# Patient Record
Sex: FEMALE | Race: WHITE | NOT HISPANIC OR LATINO | Employment: OTHER | URBAN - METROPOLITAN AREA
[De-identification: names, ages, dates, MRNs, and addresses within clinical notes are randomized per-mention and may not be internally consistent; named-entity substitution may affect disease eponyms.]

---

## 2017-03-13 DIAGNOSIS — J43.8 OTHER EMPHYSEMA (HCC): ICD-10-CM

## 2017-03-16 DIAGNOSIS — R06.02 SOB (SHORTNESS OF BREATH): ICD-10-CM

## 2017-03-16 RX ORDER — ALBUTEROL SULFATE 2.5 MG/3ML
2.5 SOLUTION RESPIRATORY (INHALATION) EVERY 4 HOURS PRN
Qty: 75 ML | Refills: 3 | Status: SHIPPED | OUTPATIENT
Start: 2017-03-16 | End: 2017-08-10

## 2017-06-05 RX ORDER — IRBESARTAN AND HYDROCHLOROTHIAZIDE 150; 12.5 MG/1; MG/1
TABLET, FILM COATED ORAL
Qty: 100 TAB | Refills: 1 | Status: ON HOLD | OUTPATIENT
Start: 2017-06-05 | End: 2017-08-12

## 2017-06-05 RX ORDER — CLOPIDOGREL BISULFATE 75 MG/1
TABLET ORAL
Qty: 100 TAB | Refills: 1 | Status: SHIPPED | OUTPATIENT
Start: 2017-06-05 | End: 2018-03-11 | Stop reason: SDUPTHER

## 2017-06-05 NOTE — TELEPHONE ENCOUNTER
Was the patient seen in the last year in this department? Yes     Does patient have an active prescription for medications requested? Yes     Received Request Via: Pharmacy     Last Visit: 10/17/16  Last Labs: 8/27/16

## 2017-06-19 RX ORDER — ATORVASTATIN CALCIUM 40 MG/1
TABLET, FILM COATED ORAL
Qty: 100 TAB | Refills: 1 | Status: SHIPPED | OUTPATIENT
Start: 2017-06-19 | End: 2017-08-10

## 2017-08-10 ENCOUNTER — APPOINTMENT (OUTPATIENT)
Dept: RADIOLOGY | Facility: MEDICAL CENTER | Age: 67
DRG: 690 | End: 2017-08-10
Attending: STUDENT IN AN ORGANIZED HEALTH CARE EDUCATION/TRAINING PROGRAM
Payer: MEDICARE

## 2017-08-10 ENCOUNTER — APPOINTMENT (OUTPATIENT)
Dept: RADIOLOGY | Facility: MEDICAL CENTER | Age: 67
DRG: 690 | End: 2017-08-10
Attending: EMERGENCY MEDICINE
Payer: MEDICARE

## 2017-08-10 ENCOUNTER — HOSPITAL ENCOUNTER (INPATIENT)
Facility: MEDICAL CENTER | Age: 67
LOS: 2 days | DRG: 690 | End: 2017-08-12
Attending: EMERGENCY MEDICINE | Admitting: INTERNAL MEDICINE
Payer: MEDICARE

## 2017-08-10 ENCOUNTER — RESOLUTE PROFESSIONAL BILLING HOSPITAL PROF FEE (OUTPATIENT)
Dept: HOSPITALIST | Facility: MEDICAL CENTER | Age: 67
End: 2017-08-10
Payer: MEDICARE

## 2017-08-10 DIAGNOSIS — N39.0 ACUTE UTI: ICD-10-CM

## 2017-08-10 DIAGNOSIS — N83.8 OVARIAN MASS, LEFT: ICD-10-CM

## 2017-08-10 DIAGNOSIS — A41.9 SEPSIS, DUE TO UNSPECIFIED ORGANISM: ICD-10-CM

## 2017-08-10 DIAGNOSIS — R50.9 FEVER, UNSPECIFIED FEVER CAUSE: ICD-10-CM

## 2017-08-10 PROBLEM — N94.89 ADNEXAL MASS: Status: ACTIVE | Noted: 2017-08-10

## 2017-08-10 PROBLEM — N12 PYELONEPHRITIS: Status: ACTIVE | Noted: 2017-08-10

## 2017-08-10 LAB
ALBUMIN SERPL BCP-MCNC: 3.8 G/DL (ref 3.2–4.9)
ALBUMIN/GLOB SERPL: 1.2 G/DL
ALP SERPL-CCNC: 100 U/L (ref 30–99)
ALT SERPL-CCNC: 56 U/L (ref 2–50)
ANION GAP SERPL CALC-SCNC: 9 MMOL/L (ref 0–11.9)
APPEARANCE UR: ABNORMAL
APTT PPP: 32.5 SEC (ref 24.7–36)
AST SERPL-CCNC: 45 U/L (ref 12–45)
BACTERIA #/AREA URNS HPF: ABNORMAL /HPF
BASOPHILS # BLD AUTO: 0.5 % (ref 0–1.8)
BASOPHILS # BLD: 0.05 K/UL (ref 0–0.12)
BILIRUB SERPL-MCNC: 0.8 MG/DL (ref 0.1–1.5)
BILIRUB UR QL STRIP.AUTO: NEGATIVE
BUN SERPL-MCNC: 16 MG/DL (ref 8–22)
CALCIUM SERPL-MCNC: 9.4 MG/DL (ref 8.5–10.5)
CHLORIDE SERPL-SCNC: 101 MMOL/L (ref 96–112)
CO2 SERPL-SCNC: 21 MMOL/L (ref 20–33)
COLOR UR: YELLOW
CREAT SERPL-MCNC: 1.58 MG/DL (ref 0.5–1.4)
EKG IMPRESSION: NORMAL
EOSINOPHIL # BLD AUTO: 0 K/UL (ref 0–0.51)
EOSINOPHIL NFR BLD: 0 % (ref 0–6.9)
ERYTHROCYTE [DISTWIDTH] IN BLOOD BY AUTOMATED COUNT: 45.1 FL (ref 35.9–50)
GFR SERPL CREATININE-BSD FRML MDRD: 32 ML/MIN/1.73 M 2
GLOBULIN SER CALC-MCNC: 3.3 G/DL (ref 1.9–3.5)
GLUCOSE SERPL-MCNC: 118 MG/DL (ref 65–99)
GLUCOSE UR STRIP.AUTO-MCNC: NEGATIVE MG/DL
HCT VFR BLD AUTO: 43.8 % (ref 37–47)
HGB BLD-MCNC: 14.7 G/DL (ref 12–16)
IMM GRANULOCYTES # BLD AUTO: 0.1 K/UL (ref 0–0.11)
IMM GRANULOCYTES NFR BLD AUTO: 1 % (ref 0–0.9)
INR PPP: 1.04 (ref 0.87–1.13)
KETONES UR STRIP.AUTO-MCNC: NEGATIVE MG/DL
LACTATE BLD-SCNC: 1.1 MMOL/L (ref 0.5–2)
LACTATE BLD-SCNC: 1.3 MMOL/L (ref 0.5–2)
LEUKOCYTE ESTERASE UR QL STRIP.AUTO: ABNORMAL
LYMPHOCYTES # BLD AUTO: 1.03 K/UL (ref 1–4.8)
LYMPHOCYTES NFR BLD: 9.9 % (ref 22–41)
MCH RBC QN AUTO: 31.5 PG (ref 27–33)
MCHC RBC AUTO-ENTMCNC: 33.6 G/DL (ref 33.6–35)
MCV RBC AUTO: 93.8 FL (ref 81.4–97.8)
MICRO URNS: ABNORMAL
MONOCYTES # BLD AUTO: 1.02 K/UL (ref 0–0.85)
MONOCYTES NFR BLD AUTO: 9.8 % (ref 0–13.4)
NEUTROPHILS # BLD AUTO: 8.2 K/UL (ref 2–7.15)
NEUTROPHILS NFR BLD: 78.8 % (ref 44–72)
NITRITE UR QL STRIP.AUTO: NEGATIVE
NRBC # BLD AUTO: 0 K/UL
NRBC BLD AUTO-RTO: 0 /100 WBC
PH UR STRIP.AUTO: 6 [PH]
PLATELET # BLD AUTO: 165 K/UL (ref 164–446)
PMV BLD AUTO: 9.8 FL (ref 9–12.9)
POTASSIUM SERPL-SCNC: 4.1 MMOL/L (ref 3.6–5.5)
PROT SERPL-MCNC: 7.1 G/DL (ref 6–8.2)
PROT UR QL STRIP: 100 MG/DL
PROTHROMBIN TIME: 13.9 SEC (ref 12–14.6)
RBC # BLD AUTO: 4.67 M/UL (ref 4.2–5.4)
RBC # URNS HPF: ABNORMAL /HPF
RBC UR QL AUTO: ABNORMAL
SODIUM SERPL-SCNC: 131 MMOL/L (ref 135–145)
SP GR UR STRIP.AUTO: 1
WBC # BLD AUTO: 10.4 K/UL (ref 4.8–10.8)
WBC #/AREA URNS HPF: ABNORMAL /HPF

## 2017-08-10 PROCEDURE — 87077 CULTURE AEROBIC IDENTIFY: CPT

## 2017-08-10 PROCEDURE — 36415 COLL VENOUS BLD VENIPUNCTURE: CPT

## 2017-08-10 PROCEDURE — 700111 HCHG RX REV CODE 636 W/ 250 OVERRIDE (IP): Performed by: EMERGENCY MEDICINE

## 2017-08-10 PROCEDURE — 700101 HCHG RX REV CODE 250

## 2017-08-10 PROCEDURE — 71010 DX-CHEST-PORTABLE (1 VIEW): CPT

## 2017-08-10 PROCEDURE — 700105 HCHG RX REV CODE 258: Performed by: EMERGENCY MEDICINE

## 2017-08-10 PROCEDURE — 93005 ELECTROCARDIOGRAM TRACING: CPT | Performed by: EMERGENCY MEDICINE

## 2017-08-10 PROCEDURE — 80053 COMPREHEN METABOLIC PANEL: CPT

## 2017-08-10 PROCEDURE — 85730 THROMBOPLASTIN TIME PARTIAL: CPT

## 2017-08-10 PROCEDURE — 94760 N-INVAS EAR/PLS OXIMETRY 1: CPT

## 2017-08-10 PROCEDURE — 81001 URINALYSIS AUTO W/SCOPE: CPT

## 2017-08-10 PROCEDURE — 99285 EMERGENCY DEPT VISIT HI MDM: CPT

## 2017-08-10 PROCEDURE — 74176 CT ABD & PELVIS W/O CONTRAST: CPT

## 2017-08-10 PROCEDURE — A9270 NON-COVERED ITEM OR SERVICE: HCPCS | Performed by: STUDENT IN AN ORGANIZED HEALTH CARE EDUCATION/TRAINING PROGRAM

## 2017-08-10 PROCEDURE — 700102 HCHG RX REV CODE 250 W/ 637 OVERRIDE(OP): Performed by: STUDENT IN AN ORGANIZED HEALTH CARE EDUCATION/TRAINING PROGRAM

## 2017-08-10 PROCEDURE — 85610 PROTHROMBIN TIME: CPT

## 2017-08-10 PROCEDURE — 700111 HCHG RX REV CODE 636 W/ 250 OVERRIDE (IP): Performed by: STUDENT IN AN ORGANIZED HEALTH CARE EDUCATION/TRAINING PROGRAM

## 2017-08-10 PROCEDURE — 85025 COMPLETE CBC W/AUTO DIFF WBC: CPT

## 2017-08-10 PROCEDURE — 87186 SC STD MICRODIL/AGAR DIL: CPT

## 2017-08-10 PROCEDURE — 700105 HCHG RX REV CODE 258: Performed by: STUDENT IN AN ORGANIZED HEALTH CARE EDUCATION/TRAINING PROGRAM

## 2017-08-10 PROCEDURE — 87040 BLOOD CULTURE FOR BACTERIA: CPT

## 2017-08-10 PROCEDURE — 96375 TX/PRO/DX INJ NEW DRUG ADDON: CPT

## 2017-08-10 PROCEDURE — 76830 TRANSVAGINAL US NON-OB: CPT

## 2017-08-10 PROCEDURE — 87086 URINE CULTURE/COLONY COUNT: CPT

## 2017-08-10 PROCEDURE — 700101 HCHG RX REV CODE 250: Performed by: EMERGENCY MEDICINE

## 2017-08-10 PROCEDURE — 770020 HCHG ROOM/CARE - TELE (206)

## 2017-08-10 PROCEDURE — 94640 AIRWAY INHALATION TREATMENT: CPT

## 2017-08-10 PROCEDURE — 96365 THER/PROPH/DIAG IV INF INIT: CPT

## 2017-08-10 PROCEDURE — 96361 HYDRATE IV INFUSION ADD-ON: CPT

## 2017-08-10 PROCEDURE — 83605 ASSAY OF LACTIC ACID: CPT | Mod: 91

## 2017-08-10 RX ORDER — ONDANSETRON 2 MG/ML
4 INJECTION INTRAMUSCULAR; INTRAVENOUS ONCE
Status: COMPLETED | OUTPATIENT
Start: 2017-08-10 | End: 2017-08-10

## 2017-08-10 RX ORDER — CLOPIDOGREL BISULFATE 75 MG/1
75 TABLET ORAL DAILY
Status: DISCONTINUED | OUTPATIENT
Start: 2017-08-11 | End: 2017-08-12 | Stop reason: HOSPADM

## 2017-08-10 RX ORDER — RALOXIFENE HYDROCHLORIDE 60 MG/1
60 TABLET, FILM COATED ORAL DAILY
Status: DISCONTINUED | OUTPATIENT
Start: 2017-08-11 | End: 2017-08-10

## 2017-08-10 RX ORDER — ACETAMINOPHEN 325 MG/1
650 TABLET ORAL EVERY 6 HOURS PRN
Status: DISCONTINUED | OUTPATIENT
Start: 2017-08-10 | End: 2017-08-12 | Stop reason: HOSPADM

## 2017-08-10 RX ORDER — BUPROPION HYDROCHLORIDE 150 MG/1
150 TABLET, EXTENDED RELEASE ORAL DAILY
Status: DISCONTINUED | OUTPATIENT
Start: 2017-08-11 | End: 2017-08-12 | Stop reason: HOSPADM

## 2017-08-10 RX ORDER — LABETALOL HYDROCHLORIDE 5 MG/ML
10 INJECTION, SOLUTION INTRAVENOUS EVERY 4 HOURS PRN
Status: DISCONTINUED | OUTPATIENT
Start: 2017-08-10 | End: 2017-08-12 | Stop reason: HOSPADM

## 2017-08-10 RX ORDER — SODIUM CHLORIDE 9 MG/ML
30 INJECTION, SOLUTION INTRAVENOUS ONCE
Status: COMPLETED | OUTPATIENT
Start: 2017-08-10 | End: 2017-08-10

## 2017-08-10 RX ORDER — ATORVASTATIN CALCIUM 20 MG/1
40 TABLET, FILM COATED ORAL DAILY
Status: DISCONTINUED | OUTPATIENT
Start: 2017-08-10 | End: 2017-08-10

## 2017-08-10 RX ORDER — IPRATROPIUM BROMIDE AND ALBUTEROL SULFATE 2.5; .5 MG/3ML; MG/3ML
3 SOLUTION RESPIRATORY (INHALATION)
Status: COMPLETED | OUTPATIENT
Start: 2017-08-10 | End: 2017-08-10

## 2017-08-10 RX ORDER — IPRATROPIUM BROMIDE AND ALBUTEROL SULFATE 2.5; .5 MG/3ML; MG/3ML
3 SOLUTION RESPIRATORY (INHALATION)
Status: DISCONTINUED | OUTPATIENT
Start: 2017-08-10 | End: 2017-08-12 | Stop reason: HOSPADM

## 2017-08-10 RX ORDER — OXYCODONE HYDROCHLORIDE 5 MG/1
5 TABLET ORAL EVERY 6 HOURS PRN
Status: DISCONTINUED | OUTPATIENT
Start: 2017-08-10 | End: 2017-08-12 | Stop reason: HOSPADM

## 2017-08-10 RX ORDER — RALOXIFENE HYDROCHLORIDE 60 MG/1
60 TABLET, FILM COATED ORAL DAILY
Status: DISCONTINUED | OUTPATIENT
Start: 2017-08-11 | End: 2017-08-12 | Stop reason: HOSPADM

## 2017-08-10 RX ORDER — MORPHINE SULFATE 4 MG/ML
4 INJECTION, SOLUTION INTRAMUSCULAR; INTRAVENOUS ONCE
Status: COMPLETED | OUTPATIENT
Start: 2017-08-10 | End: 2017-08-10

## 2017-08-10 RX ORDER — MORPHINE SULFATE 4 MG/ML
2 INJECTION, SOLUTION INTRAMUSCULAR; INTRAVENOUS EVERY 4 HOURS PRN
Status: DISCONTINUED | OUTPATIENT
Start: 2017-08-10 | End: 2017-08-12 | Stop reason: HOSPADM

## 2017-08-10 RX ORDER — OMEPRAZOLE 20 MG/1
20 CAPSULE, DELAYED RELEASE ORAL DAILY
Status: DISCONTINUED | OUTPATIENT
Start: 2017-08-11 | End: 2017-08-12 | Stop reason: HOSPADM

## 2017-08-10 RX ORDER — CYCLOBENZAPRINE HCL 10 MG
10 TABLET ORAL 3 TIMES DAILY PRN
Status: DISCONTINUED | OUTPATIENT
Start: 2017-08-10 | End: 2017-08-12 | Stop reason: HOSPADM

## 2017-08-10 RX ORDER — BUDESONIDE AND FORMOTEROL FUMARATE DIHYDRATE 160; 4.5 UG/1; UG/1
2 AEROSOL RESPIRATORY (INHALATION) EVERY 12 HOURS
Status: DISCONTINUED | OUTPATIENT
Start: 2017-08-10 | End: 2017-08-12 | Stop reason: HOSPADM

## 2017-08-10 RX ORDER — SODIUM CHLORIDE 9 MG/ML
INJECTION, SOLUTION INTRAVENOUS CONTINUOUS
Status: DISCONTINUED | OUTPATIENT
Start: 2017-08-10 | End: 2017-08-11

## 2017-08-10 RX ORDER — IPRATROPIUM BROMIDE AND ALBUTEROL SULFATE 2.5; .5 MG/3ML; MG/3ML
SOLUTION RESPIRATORY (INHALATION)
Status: COMPLETED
Start: 2017-08-10 | End: 2017-08-10

## 2017-08-10 RX ADMIN — IPRATROPIUM BROMIDE AND ALBUTEROL SULFATE 3 ML: .5; 3 SOLUTION RESPIRATORY (INHALATION) at 13:49

## 2017-08-10 RX ADMIN — CEFEPIME 2 G: 2 INJECTION, POWDER, FOR SOLUTION INTRAMUSCULAR; INTRAVENOUS at 12:01

## 2017-08-10 RX ADMIN — SODIUM CHLORIDE 2244 ML: 9 INJECTION, SOLUTION INTRAVENOUS at 10:23

## 2017-08-10 RX ADMIN — IPRATROPIUM BROMIDE AND ALBUTEROL SULFATE 3 ML: .5; 3 SOLUTION RESPIRATORY (INHALATION) at 20:58

## 2017-08-10 RX ADMIN — IPRATROPIUM BROMIDE AND ALBUTEROL SULFATE 3 ML: 2.5; .5 SOLUTION RESPIRATORY (INHALATION) at 13:49

## 2017-08-10 RX ADMIN — MORPHINE SULFATE 2 MG: 4 INJECTION INTRAVENOUS at 16:58

## 2017-08-10 RX ADMIN — MORPHINE SULFATE 2 MG: 4 INJECTION INTRAVENOUS at 21:00

## 2017-08-10 RX ADMIN — ACETAMINOPHEN 650 MG: 325 TABLET, FILM COATED ORAL at 15:24

## 2017-08-10 RX ADMIN — SODIUM CHLORIDE: 9 INJECTION, SOLUTION INTRAVENOUS at 15:25

## 2017-08-10 RX ADMIN — MORPHINE SULFATE 4 MG: 4 INJECTION INTRAVENOUS at 10:23

## 2017-08-10 RX ADMIN — CEFTRIAXONE 2 G: 2 INJECTION, POWDER, FOR SOLUTION INTRAMUSCULAR; INTRAVENOUS at 23:28

## 2017-08-10 RX ADMIN — ONDANSETRON 4 MG: 2 INJECTION INTRAMUSCULAR; INTRAVENOUS at 10:23

## 2017-08-10 RX ADMIN — BUDESONIDE AND FORMOTEROL FUMARATE DIHYDRATE 2 PUFF: 160; 4.5 AEROSOL RESPIRATORY (INHALATION) at 20:25

## 2017-08-10 ASSESSMENT — ENCOUNTER SYMPTOMS
VOMITING: 0
FOCAL WEAKNESS: 0
CHILLS: 1
SENSORY CHANGE: 0
COUGH: 0
HEADACHES: 1
DIZZINESS: 0
FLANK PAIN: 1
SHORTNESS OF BREATH: 0
DOUBLE VISION: 0
BACK PAIN: 1
BLURRED VISION: 0
MYALGIAS: 0
HEARTBURN: 0
TREMORS: 0
DIARRHEA: 1
SPUTUM PRODUCTION: 0
EYE PAIN: 0
ABDOMINAL PAIN: 1
BLOOD IN STOOL: 0
CONSTIPATION: 0
WEIGHT LOSS: 0
NAUSEA: 0
PALPITATIONS: 0
FEVER: 1
ROS GI COMMENTS: SUPRAPUBIC PAIN
WEAKNESS: 0

## 2017-08-10 ASSESSMENT — LIFESTYLE VARIABLES
EVER HAD A DRINK FIRST THING IN THE MORNING TO STEADY YOUR NERVES TO GET RID OF A HANGOVER: NO
EVER FELT BAD OR GUILTY ABOUT YOUR DRINKING: NO
TOTAL SCORE: 0
EVER_SMOKED: YES
HOW MANY TIMES IN THE PAST YEAR HAVE YOU HAD 5 OR MORE DRINKS IN A DAY: 0
AVERAGE NUMBER OF DAYS PER WEEK YOU HAVE A DRINK CONTAINING ALCOHOL: 5
ON A TYPICAL DAY WHEN YOU DRINK ALCOHOL HOW MANY DRINKS DO YOU HAVE: 1
CONSUMPTION TOTAL: NEGATIVE
HAVE YOU EVER FELT YOU SHOULD CUT DOWN ON YOUR DRINKING: NO
ALCOHOL_USE: YES
HAVE PEOPLE ANNOYED YOU BY CRITICIZING YOUR DRINKING: NO

## 2017-08-10 ASSESSMENT — PATIENT HEALTH QUESTIONNAIRE - PHQ9
1. LITTLE INTEREST OR PLEASURE IN DOING THINGS: NOT AT ALL
SUM OF ALL RESPONSES TO PHQ QUESTIONS 1-9: 0
SUM OF ALL RESPONSES TO PHQ9 QUESTIONS 1 AND 2: 0
2. FEELING DOWN, DEPRESSED, IRRITABLE, OR HOPELESS: NOT AT ALL

## 2017-08-10 ASSESSMENT — PAIN SCALES - GENERAL
PAINLEVEL_OUTOF10: 0
PAINLEVEL_OUTOF10: 4
PAINLEVEL_OUTOF10: 6
PAINLEVEL_OUTOF10: 7
PAINLEVEL_OUTOF10: 10
PAINLEVEL_OUTOF10: 0
PAINLEVEL_OUTOF10: 3
PAINLEVEL_OUTOF10: 0
PAINLEVEL_OUTOF10: 9

## 2017-08-10 ASSESSMENT — COPD QUESTIONNAIRES
DURING THE PAST 4 WEEKS HOW MUCH DID YOU FEEL SHORT OF BREATH: SOME OF THE TIME
COPD SCREENING SCORE: 6
DO YOU EVER COUGH UP ANY MUCUS OR PHLEGM?: YES, A FEW DAYS A WEEK OR MONTH
HAVE YOU SMOKED AT LEAST 100 CIGARETTES IN YOUR ENTIRE LIFE: YES

## 2017-08-10 NOTE — ED PROVIDER NOTES
ED Provider Note    Scribed for Sky Barraza M.D. by Xander Armstrong. 8/10/2017, 9:31 AM.    Primary care provider: Flaquito Ordaz M.D.  Means of arrival: EMS  History obtained from: Patient  History limited by: None    CHIEF COMPLAINT  Chief Complaint   Patient presents with   • Fever   • Generalized Body Aches   • Headache   • Low Back Pain       HPI  Lauren Bowden is a 67 y.o. female who presents to the Emergency Department complaining of a fever onset four days ago. The patient reports associated mild diarrhea several days ago, left side back pain two days ago, decreased appetite, and hematuria four days ago. Per nursing notes, she also complains of generalized body aches and headache. She states that she has normally experiences urinary frequency and intermittent dysuria. She denies any vomiting, nausea, rhinorrhea, or cough. She states that she has a history of past similar symptoms with no confirmed etiology. She also states that she has a history of UTI and COPD. She is only on home oxygen at night. Patient quit smoking six years ago. Patient also has a history of left kidney atrophy. She has no known drug allergies. Patient is also taking Plavix regularly.    REVIEW OF SYSTEMS  Pertinent positives include fever, diarrhea, back pain, decreased appetite, hematuria, generalized body aches, and headaches. Pertinent negatives include no vomiting, nausea, rhinorrhea, or cough. As above, all other systems reviewed and are negative.   See HPI for further details.   C    PAST MEDICAL HISTORY   has a past medical history of Hypertension; Hypercholesteremia; COPD; Stroke (CMS-HCC); CKD (chronic kidney disease) stage 4, GFR 15-29 ml/min (CMS-HCC) (7/1/2013); and Vitamin d deficiency (7/1/2013).    SURGICAL HISTORY   has past surgical history that includes appendectomy.    SOCIAL HISTORY  Social History   Substance Use Topics   • Smoking status: Former Smoker -- 1.00 packs/day for 40 years     Quit date:  10/01/2010   • Smokeless tobacco: Never Used   • Alcohol Use: Yes      History   Drug Use No       FAMILY HISTORY  No family history reported.    CURRENT MEDICATIONS  No current facility-administered medications on file prior to encounter.     Current Outpatient Prescriptions on File Prior to Encounter   Medication Sig Dispense Refill   • atorvastatin (LIPITOR) 40 MG Tab Take 1 tablet by mouth  every day 100 Tab 1   • irbesartan-hydrochlorothiazide (AVALIDE) 150-12.5 MG per tablet Take 1 tablet by mouth  every day 100 Tab 1   • clopidogrel (PLAVIX) 75 MG Tab Take 1 tablet by mouth  every day 100 Tab 1   • albuterol (PROVENTIL) 2.5mg/3ml Nebu Soln solution for nebulization 3 mL by Nebulization route every four hours as needed for Shortness of Breath. 75 mL 3   • albuterol-ipratropium (COMBIVENT)  MCG/ACT Aerosol Inhale 2 Puffs by mouth 4 times a day. 1 Inhaler 6   • cyclobenzaprine (FLEXERIL) 10 MG Tab Take 1 Tab by mouth 3 times a day as needed. FOR MILD PAIN. 90 Tab 0   • raloxifene (EVISTA) 60 MG Tab Take 1 Tab by mouth every day. 90 Tab 0   • buPROPion SR (WELLBUTRIN-SR) 150 MG TABLET SR 12 HR sustained-release tablet Take 1 Tab by mouth every day. 90 Tab 3   • omeprazole (PRILOSEC) 20 MG delayed-release capsule Take 1 Cap by mouth every day. 90 Cap 3   • fluticasone-salmeterol (ADVAIR DISKUS) 250-50 MCG/DOSE AEROSOL POWDER, BREATH ACTIVATED Inhale 1 Puff by mouth every 12 hours. 1 Inhaler 6   • felodipine (PLENDIL) 5 MG TABLET SR 24 HR Take 10 mg by mouth every day.     • azithromycin (ZITHROMAX) 250 MG Tab Take one by mouth, every day x 4 days. (Patient not taking: Reported on 10/17/2016) 4 Tab 0       ALLERGIES  No Known Allergies    PHYSICAL EXAM  VITAL SIGNS: /77 mmHg  Pulse 106  Temp(Src) 37.8 °C (100.1 °F)  Resp 20  SpO2 95%  Vitals reviewed.  Constitutional: Alert in no apparent distress.  HENT: No signs of trauma, Bilateral external ears normal, Nose normal.   Eyes: Pupils are equal and  reactive, Conjunctiva normal, Non-icteric.   Neck: Normal range of motion, No tenderness, Supple, No stridor.   Lymphatic: No lymphadenopathy noted.   Cardiovascular: Regular rate and rhythm, no murmurs.   Thorax & Lungs: Moderate respiratory distress and on supplemental oxygen. Decreased breath sounds bilaterally. No wheezing, No chest tenderness.   Abdomen: Bowel sounds normal, Soft, No tenderness, No peritoneal signs, No masses, No pulsatile masses.   Skin: Warm, Dry, No erythema, No rash.   Back: No bony tenderness, No CVA tenderness.   Extremities: Intact distal pulses, No edema, No tenderness, No cyanosis  Musculoskeletal: Good range of motion in all major joints. No tenderness to palpation or major deformities noted.   Neurologic: Alert , Normal motor function, Normal sensory function, No focal deficits noted.   Psychiatric: Affect normal, Judgment normal, Mood normal.       DIAGNOSTIC STUDIES / PROCEDURES    LABS  Labs Reviewed   CBC WITH DIFFERENTIAL - Abnormal; Notable for the following:     Neutrophils-Polys 78.80 (*)     Lymphocytes 9.90 (*)     Immature Granulocytes 1.00 (*)     Neutrophils (Absolute) 8.20 (*)     Monos (Absolute) 1.02 (*)     All other components within normal limits   COMP METABOLIC PANEL - Abnormal; Notable for the following:     Sodium 131 (*)     Glucose 118 (*)     Creatinine 1.58 (*)     ALT(SGPT) 56 (*)     Alkaline Phosphatase 100 (*)     All other components within normal limits   URINALYSIS - Abnormal; Notable for the following:     Character Sl Cloudy (*)     Protein 100 (*)     Leukocyte Esterase Large (*)     Occult Blood Large (*)     All other components within normal limits    Narrative:     Indication for culture:->Emergency Room Patient   ESTIMATED GFR - Abnormal; Notable for the following:     GFR If  39 (*)     GFR If Non  32 (*)     All other components within normal limits   URINE MICROSCOPIC (W/UA) - Abnormal; Notable for the  "following:     WBC 10-20 (*)     -150 (*)     Bacteria Many (*)     All other components within normal limits    Narrative:     Indication for culture:->Emergency Room Patient   LACTIC ACID   LACTIC ACID   URINE CULTURE(NEW)    Narrative:     Indication for culture:->Emergency Room Patient   BLOOD CULTURE    Narrative:     Per Hospital Policy: Only change Specimen Src: to \"Line\" if  specified by physician order.   BLOOD CULTURE    Narrative:     Per Hospital Policy: Only change Specimen Src: to \"Line\" if  specified by physician order.   APTT    Narrative:     Indicate which anticoagulants the patient is on:->NONE   PROTHROMBIN TIME    Narrative:     Indicate which anticoagulants the patient is on:->NONE      All labs reviewed by me.    EKG Interpretation:  Interpreted by me    12 Lead EKG interpreted by me to show:  Normal sinus rhythm  Rate 93  Axis: Normal  Intervals: Normal  Normal T waves  Normal ST segments  My impression of this EKG: Does not indicate ischemia or arrhythmia at this time.  Comparison to EKG taken on 08/27/2017 reveals no changes.    RADIOLOGY  CT-RENAL COLIC EVALUATION(A/P W/O)   Final Result      Asymmetric perinephric stranding on the right. This can be seen in the setting of pyelonephritis. Correlation with urinalysis is recommended.      Renal cortical scarring seen on the right.      Atrophic left kidney.      Minimal prominence of the right renal collecting system.      Hepatic steatosis.      Colonic diverticulosis.      6.8 x 6.8 x 6 cm cystic left adnexal lesion may represent an ovarian cystadenoma or cystadenocarcinoma. Further evaluation with pelvic ultrasound is recommended. Gynecological evaluation is recommended.      5 mm right middle lobe pulmonary nodules.      Low Risk: No routine follow-up      High Risk: Optional CT at 12 months      Comments: Use most suspicious nodule as guide to management. Follow-up intervals may vary according to size and risk.      Low Risk - " Minimal or absent history of smoking and of other known risk factors.      High Risk - History of smoking or of other known risk factors.      Note: These recommendations do not apply to lung cancer screening, patients with immunosuppression, or patients with known primary cancer.      Fleischner Society 2017 Guidelines for Management of Incidentally Detected Pulmonary Nodules in Adults            DX-CHEST-PORTABLE (1 VIEW)   Final Result      Mild bibasilar atelectasis.      Atherosclerotic plaque.        The radiologist's interpretation of all radiological studies have been reviewed by me.    COURSE & MEDICAL DECISION MAKING  Nursing notes, VS, PMSFHx reviewed in chart.  Differential diagnoses include but not limited to: sepsis vs UTI vs ureteral colic vs pneumonia    Obtained and reviewed past medical records from 8/27/2016 which indicated visit for chest pain, admitted.    9:31 AM Patient seen and examined at bedside. Ordered for DX chest, Lactic acid, APTT, PTT, CBC with differential, CMP, U/A , Urine culture, blood culture, and estimated GFR to evaluate. Patient will be treated with NS infusion 2244 ml for tachycardia and sepsis as well as Zofran injection 4 mg and morphine injection 4 mg.    9:37 AM I ordered CT renal colic evaluation and EKG to evaluate.    9:51 AM Patient reports that she is oxygen at night at home and she is currently in need of oxygen, but there is no evidence of pneumonia. This is likely due to her COPD.    11:17 AM Review of imaging results reveal indications for UTI.    11:18 AM I discussed the patient's case and the above findings with Pharmacy who will order Maxipeme 2 g in  ml IVPB    11:19 AM Nursing informed me that the delay on U/A is due to her inability to urinate.    11:19 AM Recheck: Patient re-evaluated at beside. Patient reports feeling better with medication. Discussed patient's condition and treatment plan including the need to start antibiotics as soon as possible.  Patient's lab and radiology results discussed. The patient understood and is in agreement.      11:20 AM Paged [facility].     12:06 PM I discussed the patient's case and the above findings with [facility] who agree to transfer care of the patient at this time. I also discussed that the patient has a left adnexal mass that will require follow up.      CRITICAL CARE  The very real possibility of a deterioration of this patient's condition required the highest level of my preparedness for sudden, emergent intervention.  I provided critical care services, which included medication orders, frequent reevaluations of the patient's condition and response to treatment, ordering and reviewing test results, and discussing the case with various consultants.  The critical care time associated with the care of the patient was 40 minutes. Review chart for interventions. This time is exclusive of any other billable procedures.     DISPOSITION:  Patient will be admitted to [facility] in critical condition.      FINAL IMPRESSION  1. Acute UTI    2. Fever, unspecified fever cause    3. Sepsis, due to unspecified organism (CMS-HCC)    4. Solitary right kidney    5. Ovarian mass, left           Critical Care Time of 40 minutes, separate of any billable procedures, as outlined above.       Xander ALVARES (Scribe), am scribing for, and in the presence of, Sky Barraza M.D..    Electronically signed by: Xander Armstrong (Donita), 8/10/2017    Sky ALVARES M.D. personally performed the services described in this documentation, as scribed by Xander Armstrong in my presence, and it is both accurate and complete.    The note accurately reflects work and decisions made by me.  Sky Barraza  8/10/2017  12:20 PM

## 2017-08-10 NOTE — PROGRESS NOTES
Bed in low position, call light within reach, strip alarm on, IVF running, pt is complaining of pain in her back, tylenol given, pt said it did not help, will page doctor for something stronger, pt says it hurts when she moves, but it is fine when she is laying in bed

## 2017-08-10 NOTE — PROGRESS NOTES
Med Rec completed per patient at bedside.  Allergies reviewed   No antibiotics within the last 30 days.

## 2017-08-10 NOTE — IP AVS SNAPSHOT
[facility] Access Code: [email or web address]  Expires: 9/11/2017 12:26 PM    Your email address is not on file at [facility].  Email Addresses are required for you to sign up for Graph [facility], please contact [phone] to verify your personal information and to provide your email address prior to attempting to register for Graph Story.    Lauren Bowden  [address]  [place] [address]    Graph Story  A secure, online tool to manage your health information     [facility]’s Graph Story® is a secure, online tool that connects you to your personalized health information from the privacy of your home -- day or night - making it very easy for you to manage your healthcare. Once the activation process is completed, you can even access your medical information using the Graph Story thais, which is available for free in the Apple Thias store or Google Play store.     To learn more about Graph Story, visit [email or web address]    There are two levels of access available (as shown below):   My Chart Features  [facility] Primary Care Doctor [facility]  Specialists [facility]  Urgent  Care Non-[facility] Primary Care Doctor   Email your healthcare team securely and privately 24/7 X X X    Manage appointments: schedule your next appointment; view details of past/upcoming appointments X      Request prescription refills. X      View recent personal medical records, including lab and immunizations X X X X   View health record, including health history, allergies, medications X X X X   Read reports about your outpatient visits, procedures, consult and ER notes X X X X   See your discharge summary, which is a recap of your hospital and/or ER visit that includes your diagnosis, lab results, and care plan X X  X     How to register for [facility]:  Once your e-mail address has been verified, follow the following steps to sign up for Graph Story.     1. Go to  [email or web address]  2. Click on the Sign Up Now box, which takes you to the New Member Sign Up page. You will need to  provide the following information:  a. Enter your [facility] Access Code exactly as it appears at the top of this page. (You will not need to use this code after you’ve completed the sign-up process. If you do not sign up before the expiration date, you must request a new code.)   b. Enter your date of birth.   c. Enter your home email address.   d. Click Submit, and follow the next screen’s instructions.  3. Create a [employer] ID. This will be your [facility] login ID and cannot be changed, so think of one that is secure and easy to remember.  4. Create a [employer] password. You can change your password at any time.  5. Enter your Password Reset Question and Answer. This can be used at a later time if you forget your password.   6. Enter your e-mail address. This allows you to receive e-mail notifications when new information is available in [employer].  7. Click Sign Up. You can now view your health information.    For assistance activating your [employer] account, call [phone]

## 2017-08-10 NOTE — IP AVS SNAPSHOT
" <p align=\"LEFT\"><IMG SRC=\"[email or web address]\" alt=\"Image\" WIDTH=\"50%\" HEIGHT=\"200\" BORDER=\"\"></p>                   Name:Lauren Bowden  Medical Record Number:[id number]  CSN: [id number]    YOB: 1950   Age: 67 y.o.  Sex: female  HT:1.626 m (5' 4.02\") WT: 85.8 kg (189 lb 2.5 oz)          Admit Date: 8/10/2017     Discharge Date:   Today's Date: 8/12/2017  Attending Doctor:  Damari Wall M.D.                  Allergies:  Review of patient's allergies indicates no known allergies.          Follow-up Information     1. Follow up with Zeenat Rodriguez M.D. In 2 weeks.    Specialty:  OB/Gyn    Contact information    [address]  [address]  [facility] [address] [phone]          2. Follow up with Flaquito Ordaz M.D. In 1 week.    Specialty:  Family Medicine    Contact information    [address] [facility] [address] [phone]           Medication List      Take these Medications        Instructions    albuterol-ipratropium  MCG/ACT Aero   Commonly known as:  COMBIVENT    Inhale 2 Puffs by mouth 4 times a day.   Dose:  2 Puff       buPROPion  MG Tb12 sustained-release tablet   Commonly known as:  WELLBUTRIN-SR    Take 1 Tab by mouth every day.   Dose:  150 mg       clopidogrel 75 MG Tabs   Commonly known as:  PLAVIX    Take 1 tablet by mouth  every day       cyclobenzaprine 10 MG Tabs   Commonly known as:  FLEXERIL    Take 1 Tab by mouth 3 times a day as needed. FOR MILD PAIN.   Dose:  10 mg       felodipine 5 MG Tb24   Commonly known as:  PLENDIL    Take 10 mg by mouth every day.   Dose:  10 mg       fluticasone-salmeterol 250-50 MCG/DOSE Aepb   Commonly known as:  ADVAIR DISKUS    Inhale 1 Puff by mouth every 12 hours.   Dose:  1 Puff       omeprazole 20 MG delayed-release capsule   Commonly known as:  PRILOSEC    Take 1 Cap by mouth every day.   Dose:  20 mg       raloxifene 60 MG Tabs   Commonly known as:  EVISTA    Take 1 Tab by mouth every day.   Dose:  60 mg      " sulfamethoxazole-trimethoprim 800-160 MG tablet   Commonly known as:  BACTRIM DS    Take 1 Tab by mouth every 12 hours for 11 days.   Dose:  1 Tab

## 2017-08-10 NOTE — ED NOTES
Pt bib ems from [facility]. Pt attempted to check in, but felt too bad to wait, called EMS from [facility].  Chief Complaint   Patient presents with   • Fever   • Generalized Body Aches   • Headache   • Low Back Pain     PTA APAP 975mg PO.  Pt c/o symptoms x4d.  Pt in a gown, connected to monitor, chart up for ERP.  Sepsis protocol initiated. IV start successful. Blood to lab.

## 2017-08-10 NOTE — H&P
Internal Medicine Admitting History and Physical    Name Lauren Bowden 1950   Age/Sex 67 y.o. female   MRN [id number]   Code Status Full      After 5PM or if no immediate response to page, please call for cross-coverage  Attending/Team: [facility] Patient List for primary contact information  Call [phone] to page    1st Call - Day Intern (R1):   [place] 2nd Call - Day Sr. Resident (R2/R3):   [place]       Chief Complaint: fever, generalized body ache, headache and lower back pain.       HPI: Ms Bowden is a 66y/o female with past medical history of COPD on home 1.5-2L O2, CAD, HTN, TIA, GERD, Hypercholesterolemia, Osteoporosis, Depression and history of atrophic left kidney that came to the ED due to 1 week evolution of fever, fatigue and hematuria and lower back pain. Patient reported that she was in her usual state of health until 1 week ago that she started felling weak and noted hematuria in her urine. Patient said that she was very sure the blood was not coming from her vaginal area, she said that there are no lesions or dryness on the vaginal area. She denies dysuria or changes in the amount of urine, but reported that sometimes she gets a sharp pain in the suprapubic area. Then she started developing fever and headache The headache is localized in the occipital area and moves to the front area. She said that she was using tylenol that did not help. Then last Tuesday she started developing back pain, localized in the lower right back. The pain does not radiate to other areas and is like giving birth (as described by patient). For the pain she was using Lidocaine patch and it was helping. She decided to come to the ED because the pain and hematuria were getting worse and for the past 2 night she was having fever and chills. She also reported one episode of diarrhea that is now resolved. Also said that she stopped the Plavix 4 days ago because of the hematuria.       Review of Systems    Constitutional: Positive for fever and chills. Negative for weight loss.   HENT: Negative for hearing loss.    Eyes: Negative for blurred vision, double vision and pain.   Respiratory: Negative for cough, sputum production and shortness of breath.    Cardiovascular: Negative for chest pain and palpitations.   Gastrointestinal: Positive for abdominal pain and diarrhea. Negative for heartburn, nausea, vomiting, constipation and blood in stool.        Suprapubic pain   Genitourinary: Positive for hematuria and flank pain. Negative for dysuria, urgency and frequency.   Musculoskeletal: Positive for back pain. Negative for myalgias and joint pain.   Skin: Negative for itching and rash.   Neurological: Positive for headaches. Negative for dizziness, tremors, sensory change, focal weakness and weakness.             Past Medical History:   Past Medical History   Diagnosis Date   • Hypertension    • Hypercholesteremia    • COPD    • Stroke (CMS-HCC)    • CKD (chronic kidney disease) stage 4, GFR 15-29 ml/min (CMS-HCC) 7/1/2013     Did see Dr. Perkins; current GFR 27 6/26/13; Has both kidneys, only one is functioning.   • Vitamin d deficiency 7/1/2013     Was taking 2000 - still 26; incr to 4000       Past Surgical History:  Past Surgical History   Procedure Laterality Date   • Appendectomy         Current Outpatient Medications:  Home Medications     Reviewed by Gloria Selby (Pharmacy Tech) on 08/10/17 at 1303  Med List Status: Complete    Medication Last Dose Status    albuterol-ipratropium (COMBIVENT)  MCG/ACT Aerosol 8/9/2017 Active    buPROPion SR (WELLBUTRIN-SR) 150 MG TABLET SR 12 HR sustained-release tablet 8/9/2017 Active    clopidogrel (PLAVIX) 75 MG Tab 8/9/2017 Active    cyclobenzaprine (FLEXERIL) 10 MG Tab > week Active    felodipine (PLENDIL) 5 MG TABLET SR 24 HR 8/9/2017 Active    fluticasone-salmeterol (ADVAIR DISKUS) 250-50 MCG/DOSE AEROSOL POWDER, BREATH ACTIVATED 8/9/2017 Active     "irbesartan-hydrochlorothiazide (AVALIDE) 150-12.5 MG per tablet 8/9/2017 Active    omeprazole (PRILOSEC) 20 MG delayed-release capsule 8/9/2017 Active    raloxifene (EVISTA) 60 MG Tab 8/9/2017 Active                Medication Allergy/Sensitivities:  No Known Allergies      Family History:  Family History   Problem Relation Age of Onset   • Diabetes Sister    • Cancer Sister        Social History:  Social History     Social History   • Marital Status:      Spouse Name: N/A   • Number of Children: N/A   • Years of Education: N/A     Occupational History   • Not on file.     Social History Main Topics   • Smoking status: Former Smoker -- 1.00 packs/day for 40 years     Quit date: 10/01/2010   • Smokeless tobacco: Never Used   • Alcohol Use: Yes   • Drug Use: No   • Sexual Activity: Not on file      Comment:      Other Topics Concern   • Not on file     Social History Narrative     Living situation: Lives with her    PCP : Dr. Ordaz      Physical Exam     Filed Vitals:    08/10/17 1030 08/10/17 1100 08/10/17 1200 08/10/17 1300   BP:       Pulse: 82 77 79 72   Temp:       Resp: 16 16 19 18   Height:       Weight:       SpO2: 96% 98% 85% 97%     Body mass index is 28.31 kg/(m^2).  /77 mmHg  Pulse 72  Temp(Src) 37.8 °C (100.1 °F)  Resp 18  Ht 1.626 m (5' 4.02\")  Wt 74.844 kg (165 lb)  BMI 28.31 kg/m2  SpO2 97%  O2 therapy: Pulse Oximetry: 97 %, O2 (LPM): 2, O2 Delivery: Nasal Cannula    Physical Exam   Constitutional: She is oriented to person, place, and time and well-developed, well-nourished, and in no distress.  Non-toxic appearance. No distress.   HENT:   Head: Normocephalic and atraumatic.   Eyes: Conjunctivae and EOM are normal. Pupils are equal, round, and reactive to light.   Neck: Normal range of motion. Neck supple.   Cardiovascular: Normal rate, regular rhythm, normal heart sounds and intact distal pulses.  Exam reveals no gallop and no friction rub.    No murmur " heard.  Pulmonary/Chest: Effort normal and breath sounds normal. No respiratory distress. She has no wheezes.   Abdominal: Soft. Bowel sounds are normal. She exhibits no distension. There is tenderness in the suprapubic area and left lower quadrant. There is no rigidity, no guarding and no CVA tenderness.   CVA tenderness can be falsely negative because the patient just received pain medication.    Genitourinary: Vagina normal. Vagina exhibits normal mucosa and no lesion. No vaginal discharge found.   No blood apparent in the vaginal area   Musculoskeletal:        Right shoulder: She exhibits normal range of motion, no tenderness and no swelling.   Neurological: She is alert and oriented to person, place, and time. She displays no weakness, facial symmetry and normal speech. No cranial nerve deficit.             Data Review       Current Records review and summary: Completed    Lab Data Review:  Recent Results (from the past 24 hour(s))   Lactic acid (lactate)    Collection Time: 08/10/17  9:10 AM   Result Value Ref Range    Lactic Acid 1.3 0.5 - 2.0 mmol/L   CBC WITH DIFFERENTIAL    Collection Time: 08/10/17  9:10 AM   Result Value Ref Range    WBC 10.4 4.8 - 10.8 K/uL    RBC 4.67 4.20 - 5.40 M/uL    Hemoglobin 14.7 12.0 - 16.0 g/dL    Hematocrit 43.8 37.0 - 47.0 %    MCV 93.8 81.4 - 97.8 fL    MCH 31.5 27.0 - 33.0 pg    MCHC 33.6 33.6 - 35.0 g/dL    RDW 45.1 35.9 - 50.0 fL    Platelet Count 165 164 - 446 K/uL    MPV 9.8 9.0 - 12.9 fL    Neutrophils-Polys 78.80 (H) 44.00 - 72.00 %    Lymphocytes 9.90 (L) 22.00 - 41.00 %    Monocytes 9.80 0.00 - 13.40 %    Eosinophils 0.00 0.00 - 6.90 %    Basophils 0.50 0.00 - 1.80 %    Immature Granulocytes 1.00 (H) 0.00 - 0.90 %    Nucleated RBC 0.00 /100 WBC    Neutrophils (Absolute) 8.20 (H) 2.00 - 7.15 K/uL    Lymphs (Absolute) 1.03 1.00 - 4.80 K/uL    Monos (Absolute) 1.02 (H) 0.00 - 0.85 K/uL    Eos (Absolute) 0.00 0.00 - 0.51 K/uL    Baso (Absolute) 0.05 0.00 - 0.12 K/uL     Immature Granulocytes (abs) 0.10 0.00 - 0.11 K/uL    NRBC (Absolute) 0.00 K/uL   COMP METABOLIC PANEL    Collection Time: 08/10/17  9:10 AM   Result Value Ref Range    Sodium 131 (L) 135 - 145 mmol/L    Potassium 4.1 3.6 - 5.5 mmol/L    Chloride 101 96 - 112 mmol/L    Co2 21 20 - 33 mmol/L    Anion Gap 9.0 0.0 - 11.9    Glucose 118 (H) 65 - 99 mg/dL    Bun 16 8 - 22 mg/dL    Creatinine 1.58 (H) 0.50 - 1.40 mg/dL    Calcium 9.4 8.5 - 10.5 mg/dL    AST(SGOT) 45 12 - 45 U/L    ALT(SGPT) 56 (H) 2 - 50 U/L    Alkaline Phosphatase 100 (H) 30 - 99 U/L    Total Bilirubin 0.8 0.1 - 1.5 mg/dL    Albumin 3.8 3.2 - 4.9 g/dL    Total Protein 7.1 6.0 - 8.2 g/dL    Globulin 3.3 1.9 - 3.5 g/dL    A-G Ratio 1.2 g/dL   APTT    Collection Time: 08/10/17  9:10 AM   Result Value Ref Range    APTT 32.5 24.7 - 36.0 sec   PROTHROMBIN TIME    Collection Time: 08/10/17  9:10 AM   Result Value Ref Range    PT 13.9 12.0 - 14.6 sec    INR 1.04 0.87 - 1.13   ESTIMATED GFR    Collection Time: 08/10/17  9:10 AM   Result Value Ref Range    GFR If  39 (A) >60 mL/min/1.73 m 2    GFR If Non  32 (A) >60 mL/min/1.73 m 2   EKG (NOW)    Collection Time: 08/10/17  9:51 AM   Result Value Ref Range    Report       [facility] Emergency Dept.    Test Date:  2017-08-10  Pt Name:    BEVERLY CAMPOS                 Department: ER  MRN:        [id number]                      Room:       RD 12  Gender:     F                            Technician: [id number]  :        1950                   Requested By:JESUS CHANDLER  Order #:    [id number]                    Reading MD: JESUS CHANDLER MD    Measurements  Intervals                                Axis  Rate:       93                           P:          64  OR:         148                          QRS:        15  QRSD:       76                           T:          47  QT:         352  QTc:        438    Interpretive Statements  SINUS RHYTHM  BASELINE WANDER  IN LEAD(S) V4  Compared to ECG 08/27/2016 08:27:38  No significant changes    Electronically Signed On 8- 9:58:01 PDT by JESUS CHANDLER MD     Lactic acid (lactate)    Collection Time: 08/10/17 11:22 AM   Result Value Ref Range    Lactic Acid 1.1 0.5 - 2.0 mmol/L   URINALYSIS    Collection Time: 08/10/17 11:30 AM   Result Value Ref Range    Micro Urine Req Microscopic     Color Yellow     Character Sl Cloudy (A)     Specific Gravity 1.005 <1.035    Ph 6.0 5.0-8.0    Glucose Negative Negative mg/dL    Ketones Negative Negative mg/dL    Protein 100 (A) Negative mg/dL    Bilirubin Negative Negative    Nitrite Negative Negative    Leukocyte Esterase Large (A) Negative    Occult Blood Large (A) Negative   URINE MICROSCOPIC (W/UA)    Collection Time: 08/10/17 11:30 AM   Result Value Ref Range    WBC 10-20 (A) /hpf    -150 (A) /hpf    Bacteria Many (A) None /hpf       Imaging/Procedures Review:      CT-RENAL COLIC EVALUATION(A/P W/O)   Final Result      Asymmetric perinephric stranding on the right. This can be seen in the setting of pyelonephritis. Correlation with urinalysis is recommended.      Renal cortical scarring seen on the right.      Atrophic left kidney.      Minimal prominence of the right renal collecting system.      Hepatic steatosis.      Colonic diverticulosis.      6.8 x 6.8 x 6 cm cystic left adnexal lesion may represent an ovarian cystadenoma or cystadenocarcinoma. Further evaluation with pelvic ultrasound is recommended. Gynecological evaluation is recommended.      5 mm right middle lobe pulmonary nodules.      Low Risk: No routine follow-up      High Risk: Optional CT at 12 months      Comments: Use most suspicious nodule as guide to management. Follow-up intervals may vary according to size and risk.      Low Risk - Minimal or absent history of smoking and of other known risk factors.      High Risk - History of smoking or of other known risk factors.      Note: These  recommendations do not apply to lung cancer screening, patients with immunosuppression, or patients with known primary cancer.      Fleischner Society 2017 Guidelines for Management of Incidentally Detected Pulmonary Nodules in Adults            DX-CHEST-PORTABLE (1 VIEW)   Final Result      Mild bibasilar atelectasis.      Atherosclerotic plaque.      US-GYN-PELVIS TRANSVAGINAL    (Results Pending)            EKG:   EKG Independant Review: Completed  QTc:438, HR: 93, Normal Sinus Rhythm, no ST/T changes                Assessment/Plan     Pyelonephritis  Assessment & Plan  - patient came with a week progression of fever, low back pain, hematuria and chills.   - patient does not have CVA at the time of examination, it could be false negative due to recent pain medication use   - UA consistent with infection, hematuria present   - started ceftriaxone   - continue monitoring     COPD (chronic obstructive pulmonary disease) (CMS-HCC) (present on admission)  Assessment & Plan  - patient has history of COPD  - uses oxygen at home on 1-2L   - Symbicort and Duoneb   - Respiratory protocol   - continue monitoring     HTN (hypertension) (present on admission)  Assessment & Plan  Patient has history of HTN and HCL   -     Adnexal mass  Assessment & Plan  An incidental finding was noted on Renal CT  - 6.8 x 6.8 x 6 cm cystic left adnexal lesion may represent an ovarian cystadenoma or cystadenocarcinoma.  Plan   - Transvaginal US ordered   - Follow up with Gynecology outpatient         Anticipated Hospital stay:  >2 midnights        Quality Measures  EKG reviewed, Labs reviewed, Medications reviewed and Radiology images reviewed  Melton catheter: No Melton          Ulcer prophylaxis: Yes  Antibiotics: Treating active infection/contamination beyond 24 hours perioperative coverage

## 2017-08-10 NOTE — ASSESSMENT & PLAN NOTE
- presents with one week of right sided costovertebral pain and dysuria/hematuria  - UA with large occult blood/LE and many bacteria  - s/p CT scan: right side perinephric stranding  - blood cx, urine cx pending  Plan:  - continue ceftriaxone while awaiting culture results  - transition to orals once sensitivity available  - PRN pain medication

## 2017-08-10 NOTE — IP AVS SNAPSHOT
8/12/2017    Lauren Bowden  [address]  [place] [address]    Dear Lauren:    [facility] wants to ensure your discharge home is safe and you or your loved ones have had all of your questions answered regarding your care after you leave the hospital.    Below is a list of resources and contact information should you have any questions regarding your hospital stay, follow-up instructions, or active medical symptoms.    Questions or Concerns Regarding… Contact   Medical Questions Related to Your Discharge  (7 days a week, 8am-5pm) Contact a Nurse Care Coordinator   [phone]   Medical Questions Not Related to Your Discharge  (24 hours a day / 7 days a week)  Contact the Nurse Health Line   [phone]    Medications or Discharge Instructions Refer to your discharge packet   or contact your [facility] Primary Care Provider   [phone]   Follow-up Appointment(s) Schedule your appointment via [employer]   or contact Scheduling [phone]   Billing Review your statement via [employer]  or contact Billing [phone]   Medical Records Review your records via [employer]   or contact Medical Records [phone]     You may receive a telephone call within two days of discharge. This call is to make certain you understand your discharge instructions and have the opportunity to have any questions answered. You can also easily access your medical information, test results and upcoming appointments via the [employer] free online health management tool. You can learn more and sign up at [email or web address]. For assistance setting up your [employer] account, please call [phone].    Once again, we want to ensure your discharge home is safe and that you have a clear understanding of any next steps in your care. If you have any questions or concerns, please do not hesitate to contact us, we are here for you. Thank you for choosing [facility] for your healthcare needs.    Sincerely,    Your [facility] Healthcare Team

## 2017-08-10 NOTE — IP AVS SNAPSHOT
" Home Care Instructions                                                                                                                  Name:Lauren Bowden  Medical Record Number:[id number]  CSN: [id number]    YOB: 1950   Age: 67 y.o.  Sex: female  HT:1.626 m (5' 4.02\") WT: 85.8 kg (189 lb 2.5 oz)          Admit Date: 8/10/2017     Discharge Date:   Today's Date: 8/12/2017  Attending Doctor:  Damari aWll M.D.                  Allergies:  Review of patient's allergies indicates no known allergies.            Discharge Instructions       Discharge Instructions    Discharged to home by car with relative. Discharged via wheelchair, hospital escort: Yes.  Special equipment needed: Not Applicable    Be sure to schedule a follow-up appointment with your primary care doctor or any specialists as instructed.     Discharge Plan:   Diet Plan: Discussed  Activity Level: Discussed  Confirmed Follow up Appointment: Patient to Call and Schedule Appointment  Confirmed Symptoms Management: Discussed  Influenza Vaccine Indication: Indicated: Not available from distributor/    I understand that a diet low in cholesterol, fat, and sodium is recommended for good health. Unless I have been given specific instructions below for another diet, I accept this instruction as my diet prescription.   Other diet: Regular    Special Instructions: None    · Is patient discharged on Warfarin / Coumadin?   No     · Is patient Post Blood Transfusion?  No    Pyelonephritis, Adult  Pyelonephritis is a kidney infection. A kidney infection can happen quickly, or it can last for a long time.  HOME CARE   · Take your medicine (antibiotics) as told. Finish it even if you start to feel better.  · Keep all doctor visits as told.  · Drink enough fluids to keep your pee (urine) clear or pale yellow.  · Only take medicine as told by your doctor.  GET HELP RIGHT AWAY IF:   · You have a fever or lasting symptoms for more than 2-3 days.  · You " have a fever and your symptoms suddenly get worse.  · You cannot take your medicine or drink fluids as told.  · You have chills and shaking.  · You feel very weak or pass out (faint).  · You do not feel better after 2 days.  MAKE SURE YOU:  · Understand these instructions.  · Will watch your condition.  · Will get help right away if you are not doing well or get worse.     This information is not intended to replace advice given to you by your health care provider. Make sure you discuss any questions you have with your health care provider.     Document Released: 01/25/2006 Document Revised: 01/08/2016 Document Reviewed: 06/06/2012  [employer] Interactive Patient Education ©2016 [employer]      Depression / Suicide Risk    As you are discharged from this [facility] facility, it is important to learn how to keep safe from harming yourself.    Recognize the warning signs:  · Abrupt changes in personality, positive or negative- including increase in energy   · Giving away possessions  · Change in eating patterns- significant weight changes-  positive or negative  · Change in sleeping patterns- unable to sleep or sleeping all the time   · Unwillingness or inability to communicate  · Depression  · Unusual sadness, discouragement and loneliness  · Talk of wanting to die  · Neglect of personal appearance   · Rebelliousness- reckless behavior  · Withdrawal from people/activities they love  · Confusion- inability to concentrate     If you or a loved one observes any of these behaviors or has concerns about self-harm, here's what you can do:  · Talk about it- your feelings and reasons for harming yourself  · Remove any means that you might use to hurt yourself (examples: pills, rope, extension cords, firearm)  · Get professional help from the community (Mental Health, Substance Abuse, psychological counseling)  · Do not be alone:Call your Safe Contact- someone whom you trust who will be there for you.  · Call your local  CRISIS HOTLINE 834-9123 or 524-108-3975  · Call your local Children's Mobile Crisis Response Team [place] (230) 651-3633 or www.[employer]  · Call the toll free National Suicide Prevention Hotlines   · National Suicide Prevention Lifeline 044-564-AYKS (6180)  · National Hope Line Network 800-SUICIDE (393-7611)        Follow-up Information     1. Follow up with Zeenat Rodriguez M.D. In 2 weeks.    Specialty:  OB/Gyn    Contact information    [id number]  [address]  [facility] [place] [address] [phone]          2. Follow up with Flaquito Ordaz M.D. In 1 week.    Specialty:  Family Medicine    Contact information    [address] [facility] [address] [phone]           Discharge Medication Instructions:    Below are the medications your physician expects you to take upon discharge:    Review all your home medications and newly ordered medications with your doctor and/or pharmacist. Follow medication instructions as directed by your doctor and/or pharmacist.    Please keep your medication list with you and share with your physician.               Medication List      START taking these medications        Instructions    Morning Afternoon Evening Bedtime    sulfamethoxazole-trimethoprim 800-160 MG tablet   Commonly known as:  BACTRIM DS        Take 1 Tab by mouth every 12 hours for 11 days.   Dose:  1 Tab                          CONTINUE taking these medications        Instructions    Morning Afternoon Evening Bedtime    albuterol-ipratropium  MCG/ACT Aero   Commonly known as:  COMBIVENT        Inhale 2 Puffs by mouth 4 times a day.   Dose:  2 Puff                        buPROPion  MG Tb12 sustained-release tablet   Last time this was given:  150 mg on 8/12/2017  8:35 AM   Commonly known as:  WELLBUTRIN-SR        Take 1 Tab by mouth every day.   Dose:  150 mg                        clopidogrel 75 MG Tabs   Last time this was given:  75 mg on 8/12/2017  8:35 AM   Commonly known as:   PLAVIX        Take 1 tablet by mouth  every day                        cyclobenzaprine 10 MG Tabs   Last time this was given:  10 mg on 8/12/2017 11:27 AM   Commonly known as:  FLEXERIL        Take 1 Tab by mouth 3 times a day as needed. FOR MILD PAIN.   Dose:  10 mg                        felodipine 5 MG Tb24   Commonly known as:  PLENDIL        Take 10 mg by mouth every day.   Dose:  10 mg                        fluticasone-salmeterol 250-50 MCG/DOSE Aepb   Commonly known as:  ADVAIR DISKUS        Inhale 1 Puff by mouth every 12 hours.   Dose:  1 Puff                        omeprazole 20 MG delayed-release capsule   Last time this was given:  20 mg on 8/12/2017  8:35 AM   Commonly known as:  PRILOSEC        Take 1 Cap by mouth every day.   Dose:  20 mg                        raloxifene 60 MG Tabs   Last time this was given:  60 mg on 8/12/2017 10:24 AM   Commonly known as:  EVISTA        Take 1 Tab by mouth every day.   Dose:  60 mg                          STOP taking these medications     irbesartan-hydrochlorothiazide 150-12.5 MG per tablet   Commonly known as:  AVALIDE                    Where to Get Your Medications      Information about where to get these medications is not yet available     ! Ask your nurse or doctor about these medications    - sulfamethoxazole-trimethoprim 800-160 MG tablet            Instructions           Diet / Nutrition:    Follow any diet instructions given to you by your doctor or the dietician, including how much salt (sodium) you are allowed each day.    If you are overweight, talk to your doctor about a weight reduction plan.    Activity:    Remain physically active following your doctor's instructions about exercise and activity.    Rest often.     Any time you become even a little tired or short of breath, SIT DOWN and rest.    Worsening Symptoms:    Report any of the following signs and symptoms to the doctor's office immediately:    *Pain of jaw, arm, or neck  *Chest pain  not relieved by medication                               *Dizziness or loss of consciousness  *Difficulty breathing even when at rest   *More tired than usual                                       *Bleeding drainage or swelling of surgical site  *Swelling of feet, ankles, legs or stomach                 *Fever (>100ºF)  *Pink or blood tinged sputum  *Weight gain (3lbs/day or 5lbs /week)           *Shock from internal defibrillator (if applicable)  *Palpitations or irregular heartbeats                *Cool and/or numb extremities    Stroke Awareness    Common Risk Factors for Stroke include:    Age  Atrial Fibrillation  Carotid Artery Stenosis  Diabetes Mellitus  Excessive alcohol consumption  High blood pressure  Overweight   Physical inactivity  Smoking    Warning signs and symptoms of a stroke include:    *Sudden numbness or weakness of the face, arm or leg (especially on one side of the body).  *Sudden confusion, trouble speaking or understanding.  *Sudden trouble seeing in one or both eyes.  *Sudden trouble walking, dizziness, loss of balance or coordination.Sudden severe headache with no known cause.    It is very important to get treatment quickly when a stroke occurs. If you experience any of the above warning signs, call 911 immediately.                   Disclaimer         Quit Smoking / Tobacco Use:    I understand the use of any tobacco products increases my chance of suffering from future heart disease or stroke and could cause other illnesses which may shorten my life. Quitting the use of tobacco products is the single most important thing I can do to improve my health. For further information on smoking / tobacco cessation call a Toll Free Quit Line at 1-381.829.8871 (*National Cancer [place]) or 1-849.680.6244 (American Lung Association) or you can access the web based program at www.lungusa.org.    [place] Tobacco Users Help Line:  (289) 505-4599       Toll Free: 1-883.562.7562  Quit Tobacco Program  [facility] [phone]    Crisis Hotline:    [facility] Crisis Hotline:  8-213-IJYNPEP or 1-246.481.9871    [place] Crisis Hotline:    1-794.508.4371 or 541-497-8209    Discharge Survey:   Thank you for choosing [facility]. We hope we did everything we could to make your hospital stay a pleasant one. You may be receiving a phone survey and we would appreciate your time and participation in answering the questions. Your input is very valuable to us in our efforts to improve our service to our patients and their families.        My signature on this form indicates that:    1. I have reviewed and understand the above information.  2. My questions regarding this information have been answered to my satisfaction.  3. I have formulated a plan with my discharge nurse to obtain my prescribed medications for home.                  Disclaimer         __________________________________                     __________       ________                       Patient Signature                                                 Date                    Time

## 2017-08-10 NOTE — ASSESSMENT & PLAN NOTE
- An incidental finding was noted on Renal CT  - 6.8 x 6.8 x 6 cm cystic left adnexal lesion may represent an ovarian cystadenoma or cystadenocarcinoma  - similar appearance on transvaginal ultrasound (spetated hyphae)  Plan   - Follow up with Gynecology outpatient

## 2017-08-10 NOTE — SENIOR ADMIT NOTE
68 y/o female who presents for evaluation of weakness and hematuria for last one week. Has history significant for COPD on home 1.5-2 L, HLD, osteoporosis on raloxifene, TIA, history of atrophic kidney on left side and HTN. Patient states that she was otherwise in her normal state of health when she started feeling generally weak and had small hematuria. Then developed back pain on Tuesday that she compares to labor, with relief with her husbands lidocaine. Pain constant and unremitting. Otherwise states worsening hematuria so she decided to come to the hospital. Patient states she has never had anything like this in the past. States she may have been dehydrated over the last week but is urinating the same amount. Denies dysuria. States she has not been hospitalized recently.     In the ED, patient found to have gross hematuria with WBC 10--20 and many bacteria. Concern for renal colic and CT scan performed showing right side pyelonephritis along with 6 x6 adnexal cyst and multiple lung nodules on right side. She met 1/4 SIRS criteria with tachycardia. Blood cultures taken and she was given cefepime.    Vitals:  Temp 100.1, Pulse 106, RR 20, /77, 95% on 2 L    Physical Exam:  General: Sitting upright in bed, Awake and alert, no distress, pleasant and cooperative, not acutely ill appearing  HEENT: PERRLA, Dry oral mucosa  CV: RRR, S1 and S2 physiologic, no S3, no murmur  RESP: CTAB, no wheeze or crackles  ABD: soft, no evidence of suprapubic tenderness, did have left lower quadrant tenderness to deep palpation, no CVA tenderness  : no obvious lesions or bleeding, no thinning or dryness of labial folds      Labs:  WBC 10.4, Hb 14.7, Platelet 165  Na 131, K 4.1, Cl 101, CO2 21, BUN 16, Creatinine 1.58, Glucose 118  AST 45, ALT 56, Alk Phos 100, Bili 0.8  Lactic acid 1.3---> 1.1  UA with 100-150 RBC, many bacteria, 10-20 WBC      Imaging:  EKG: sinus rhythm without acute ST T wave changes, normal axis, regular  rate    CT renal colic: perinephric stranding on the right, hepatic steatosis, 6 x 6 x6 cystic adnexal lesion, atrophic left kidney, multiple right lower lobe nodule 5 mm    Assessment and Plan:  1) Pyelonephritis  2) Adnexal cyst  3) Hepatic Steatosis  4) COPD on oxygen  5) HLD, HTN  6) Atrophic left kidney    Will admit patient with diagnosis of pyelonephritis and continue patient on ceftriaxone. She does not exhibit CVA tenderness on exam but limited given recent administration of morphine and lidocaine patches. She does exhibit some left lower quadrant pain which is concerning in the setting of her adnexal cyst and will order ultrasound evaluation for further investigation. Does not appear to be a torsion or abscess. Raloxifene is not considered a risk for ovarian cancer in postmenopausal women in recent study. Otherwise will hold BP medication (felodipine/plendil) at this point given concern for developing sepsis and continue other home medication.     For more details please refer to H&P written by Dr. Haynes

## 2017-08-11 PROBLEM — N10 PYELONEPHRITIS, ACUTE: Status: ACTIVE | Noted: 2017-08-10

## 2017-08-11 PROBLEM — R91.8 LUNG NODULE, MULTIPLE: Status: ACTIVE | Noted: 2017-08-11

## 2017-08-11 PROBLEM — N18.9 CHRONIC KIDNEY DISEASE: Status: ACTIVE | Noted: 2017-08-11

## 2017-08-11 PROBLEM — E87.1 HYPONATREMIA: Status: ACTIVE | Noted: 2017-08-11

## 2017-08-11 LAB
ALBUMIN SERPL BCP-MCNC: 3.3 G/DL (ref 3.2–4.9)
ALBUMIN/GLOB SERPL: 1.1 G/DL
ALP SERPL-CCNC: 97 U/L (ref 30–99)
ALT SERPL-CCNC: 36 U/L (ref 2–50)
ANION GAP SERPL CALC-SCNC: 8 MMOL/L (ref 0–11.9)
AST SERPL-CCNC: 24 U/L (ref 12–45)
BASOPHILS # BLD AUTO: 0.4 % (ref 0–1.8)
BASOPHILS # BLD: 0.03 K/UL (ref 0–0.12)
BILIRUB SERPL-MCNC: 0.3 MG/DL (ref 0.1–1.5)
BUN SERPL-MCNC: 19 MG/DL (ref 8–22)
CALCIUM SERPL-MCNC: 8.6 MG/DL (ref 8.5–10.5)
CHLORIDE SERPL-SCNC: 103 MMOL/L (ref 96–112)
CO2 SERPL-SCNC: 22 MMOL/L (ref 20–33)
CREAT SERPL-MCNC: 1.58 MG/DL (ref 0.5–1.4)
EOSINOPHIL # BLD AUTO: 0.02 K/UL (ref 0–0.51)
EOSINOPHIL NFR BLD: 0.3 % (ref 0–6.9)
ERYTHROCYTE [DISTWIDTH] IN BLOOD BY AUTOMATED COUNT: 48.9 FL (ref 35.9–50)
GFR SERPL CREATININE-BSD FRML MDRD: 32 ML/MIN/1.73 M 2
GLOBULIN SER CALC-MCNC: 2.9 G/DL (ref 1.9–3.5)
GLUCOSE SERPL-MCNC: 103 MG/DL (ref 65–99)
HCT VFR BLD AUTO: 38.2 % (ref 37–47)
HGB BLD-MCNC: 12.4 G/DL (ref 12–16)
IMM GRANULOCYTES # BLD AUTO: 0.05 K/UL (ref 0–0.11)
IMM GRANULOCYTES NFR BLD AUTO: 0.7 % (ref 0–0.9)
LYMPHOCYTES # BLD AUTO: 1.12 K/UL (ref 1–4.8)
LYMPHOCYTES NFR BLD: 15.6 % (ref 22–41)
MCH RBC QN AUTO: 32.5 PG (ref 27–33)
MCHC RBC AUTO-ENTMCNC: 32.5 G/DL (ref 33.6–35)
MCV RBC AUTO: 100 FL (ref 81.4–97.8)
MONOCYTES # BLD AUTO: 0.92 K/UL (ref 0–0.85)
MONOCYTES NFR BLD AUTO: 12.8 % (ref 0–13.4)
NEUTROPHILS # BLD AUTO: 5.02 K/UL (ref 2–7.15)
NEUTROPHILS NFR BLD: 70.2 % (ref 44–72)
NRBC # BLD AUTO: 0 K/UL
NRBC BLD AUTO-RTO: 0 /100 WBC
OSMOLALITY UR: 429 MOSM/KG H2O (ref 300–900)
PLATELET # BLD AUTO: 110 K/UL (ref 164–446)
PMV BLD AUTO: 10.1 FL (ref 9–12.9)
POTASSIUM SERPL-SCNC: 4.6 MMOL/L (ref 3.6–5.5)
PROT SERPL-MCNC: 6.2 G/DL (ref 6–8.2)
RBC # BLD AUTO: 3.82 M/UL (ref 4.2–5.4)
SODIUM SERPL-SCNC: 133 MMOL/L (ref 135–145)
SODIUM UR-SCNC: 94 MMOL/L
WBC # BLD AUTO: 7.2 K/UL (ref 4.8–10.8)

## 2017-08-11 PROCEDURE — A9270 NON-COVERED ITEM OR SERVICE: HCPCS | Performed by: STUDENT IN AN ORGANIZED HEALTH CARE EDUCATION/TRAINING PROGRAM

## 2017-08-11 PROCEDURE — 700101 HCHG RX REV CODE 250: Performed by: EMERGENCY MEDICINE

## 2017-08-11 PROCEDURE — 700102 HCHG RX REV CODE 250 W/ 637 OVERRIDE(OP): Performed by: STUDENT IN AN ORGANIZED HEALTH CARE EDUCATION/TRAINING PROGRAM

## 2017-08-11 PROCEDURE — 94640 AIRWAY INHALATION TREATMENT: CPT

## 2017-08-11 PROCEDURE — 700105 HCHG RX REV CODE 258: Performed by: STUDENT IN AN ORGANIZED HEALTH CARE EDUCATION/TRAINING PROGRAM

## 2017-08-11 PROCEDURE — 3E0234Z INTRODUCTION OF SERUM, TOXOID AND VACCINE INTO MUSCLE, PERCUTANEOUS APPROACH: ICD-10-PCS | Performed by: INTERNAL MEDICINE

## 2017-08-11 PROCEDURE — 85025 COMPLETE CBC W/AUTO DIFF WBC: CPT

## 2017-08-11 PROCEDURE — 90471 IMMUNIZATION ADMIN: CPT

## 2017-08-11 PROCEDURE — 700111 HCHG RX REV CODE 636 W/ 250 OVERRIDE (IP): Performed by: INTERNAL MEDICINE

## 2017-08-11 PROCEDURE — 99223 1ST HOSP IP/OBS HIGH 75: CPT | Mod: AI,GC | Performed by: INTERNAL MEDICINE

## 2017-08-11 PROCEDURE — 36415 COLL VENOUS BLD VENIPUNCTURE: CPT

## 2017-08-11 PROCEDURE — 770006 HCHG ROOM/CARE - MED/SURG/GYN SEMI*

## 2017-08-11 PROCEDURE — 84300 ASSAY OF URINE SODIUM: CPT

## 2017-08-11 PROCEDURE — 700111 HCHG RX REV CODE 636 W/ 250 OVERRIDE (IP): Performed by: STUDENT IN AN ORGANIZED HEALTH CARE EDUCATION/TRAINING PROGRAM

## 2017-08-11 PROCEDURE — 80053 COMPREHEN METABOLIC PANEL: CPT

## 2017-08-11 PROCEDURE — 83935 ASSAY OF URINE OSMOLALITY: CPT

## 2017-08-11 PROCEDURE — 90670 PCV13 VACCINE IM: CPT | Performed by: INTERNAL MEDICINE

## 2017-08-11 PROCEDURE — 94760 N-INVAS EAR/PLS OXIMETRY 1: CPT

## 2017-08-11 RX ADMIN — OXYCODONE HYDROCHLORIDE 5 MG: 5 TABLET ORAL at 15:53

## 2017-08-11 RX ADMIN — IPRATROPIUM BROMIDE AND ALBUTEROL SULFATE 3 ML: .5; 3 SOLUTION RESPIRATORY (INHALATION) at 11:27

## 2017-08-11 RX ADMIN — OXYCODONE HYDROCHLORIDE 5 MG: 5 TABLET ORAL at 21:30

## 2017-08-11 RX ADMIN — MORPHINE SULFATE 2 MG: 4 INJECTION INTRAVENOUS at 02:49

## 2017-08-11 RX ADMIN — BUPROPION HYDROCHLORIDE 150 MG: 150 TABLET, EXTENDED RELEASE ORAL at 08:51

## 2017-08-11 RX ADMIN — MORPHINE SULFATE 2 MG: 4 INJECTION INTRAVENOUS at 08:57

## 2017-08-11 RX ADMIN — RALOXIFENE HYDROCHLORIDE 60 MG: 60 TABLET, FILM COATED ORAL at 08:51

## 2017-08-11 RX ADMIN — IPRATROPIUM BROMIDE AND ALBUTEROL SULFATE 3 ML: .5; 3 SOLUTION RESPIRATORY (INHALATION) at 19:03

## 2017-08-11 RX ADMIN — BUDESONIDE AND FORMOTEROL FUMARATE DIHYDRATE 2 PUFF: 160; 4.5 AEROSOL RESPIRATORY (INHALATION) at 06:53

## 2017-08-11 RX ADMIN — CLOPIDOGREL 75 MG: 75 TABLET, FILM COATED ORAL at 08:45

## 2017-08-11 RX ADMIN — PNEUMOCOCCAL 13-VALENT CONJUGATE VACCINE 0.5 ML: 2.2; 2.2; 2.2; 2.2; 2.2; 4.4; 2.2; 2.2; 2.2; 2.2; 2.2; 2.2; 2.2 INJECTION, SUSPENSION INTRAMUSCULAR at 08:51

## 2017-08-11 RX ADMIN — BUDESONIDE AND FORMOTEROL FUMARATE DIHYDRATE 2 PUFF: 160; 4.5 AEROSOL RESPIRATORY (INHALATION) at 19:04

## 2017-08-11 RX ADMIN — IPRATROPIUM BROMIDE AND ALBUTEROL SULFATE 3 ML: .5; 3 SOLUTION RESPIRATORY (INHALATION) at 06:51

## 2017-08-11 RX ADMIN — SODIUM CHLORIDE: 9 INJECTION, SOLUTION INTRAVENOUS at 02:55

## 2017-08-11 RX ADMIN — OXYCODONE HYDROCHLORIDE 5 MG: 5 TABLET ORAL at 04:20

## 2017-08-11 RX ADMIN — MORPHINE SULFATE 2 MG: 4 INJECTION INTRAVENOUS at 19:50

## 2017-08-11 RX ADMIN — OMEPRAZOLE 20 MG: 20 CAPSULE, DELAYED RELEASE ORAL at 08:45

## 2017-08-11 ASSESSMENT — ENCOUNTER SYMPTOMS
SPUTUM PRODUCTION: 0
ABDOMINAL PAIN: 1
SHORTNESS OF BREATH: 0
BLOOD IN STOOL: 0
VOMITING: 0
BACK PAIN: 1
MYALGIAS: 0
HEADACHES: 0
DIAPHORESIS: 0
WEIGHT LOSS: 0
ORTHOPNEA: 1
WEAKNESS: 0
FEVER: 0
CHILLS: 0
CONSTIPATION: 0
PALPITATIONS: 0
COUGH: 0
NAUSEA: 0
DIARRHEA: 0

## 2017-08-11 ASSESSMENT — COGNITIVE AND FUNCTIONAL STATUS - GENERAL
DAILY ACTIVITIY SCORE: 24
MOBILITY SCORE: 24
SUGGESTED CMS G CODE MODIFIER DAILY ACTIVITY: CH
SUGGESTED CMS G CODE MODIFIER MOBILITY: CH

## 2017-08-11 ASSESSMENT — PAIN SCALES - GENERAL
PAINLEVEL_OUTOF10: 7
PAINLEVEL_OUTOF10: 10
PAINLEVEL_OUTOF10: 6
PAINLEVEL_OUTOF10: 0
PAINLEVEL_OUTOF10: 4
PAINLEVEL_OUTOF10: 4
PAINLEVEL_OUTOF10: 3
PAINLEVEL_OUTOF10: 0
PAINLEVEL_OUTOF10: 3
PAINLEVEL_OUTOF10: 0
PAINLEVEL_OUTOF10: 0
PAINLEVEL_OUTOF10: 7
PAINLEVEL_OUTOF10: 2
PAINLEVEL_OUTOF10: 3
PAINLEVEL_OUTOF10: 5
PAINLEVEL_OUTOF10: 4
PAINLEVEL_OUTOF10: 4

## 2017-08-11 ASSESSMENT — COPD QUESTIONNAIRES
DO YOU EVER COUGH UP ANY MUCUS OR PHLEGM?: YES, A FEW DAYS A WEEK OR MONTH
COPD SCREENING SCORE: 4
HAVE YOU SMOKED AT LEAST 100 CIGARETTES IN YOUR ENTIRE LIFE: NO/DON'T KNOW
DURING THE PAST 4 WEEKS HOW MUCH DID YOU FEEL SHORT OF BREATH: SOME OF THE TIME

## 2017-08-11 ASSESSMENT — PATIENT HEALTH QUESTIONNAIRE - PHQ9
2. FEELING DOWN, DEPRESSED, IRRITABLE, OR HOPELESS: NOT AT ALL
SUM OF ALL RESPONSES TO PHQ QUESTIONS 1-9: 0
SUM OF ALL RESPONSES TO PHQ9 QUESTIONS 1 AND 2: 0
1. LITTLE INTEREST OR PLEASURE IN DOING THINGS: NOT AT ALL

## 2017-08-11 ASSESSMENT — LIFESTYLE VARIABLES
EVER HAD A DRINK FIRST THING IN THE MORNING TO STEADY YOUR NERVES TO GET RID OF A HANGOVER: NO
EVER FELT BAD OR GUILTY ABOUT YOUR DRINKING: NO
TOTAL SCORE: 0
DO YOU DRINK ALCOHOL: NO
HAVE PEOPLE ANNOYED YOU BY CRITICIZING YOUR DRINKING: NO
HAVE YOU EVER FELT YOU SHOULD CUT DOWN ON YOUR DRINKING: NO
CONSUMPTION TOTAL: INCOMPLETE

## 2017-08-11 NOTE — PROGRESS NOTES
Bed in low position, call light within reach, pain seems to be better today, SL, aax4, 2L O2, moving better today, room free and clear of clutter, plan of care discussed

## 2017-08-11 NOTE — ASSESSMENT & PLAN NOTE
- secondary to history of atrophic kidney  - renal function remains at baseline  - continue to avoid nephrotoxic medication

## 2017-08-11 NOTE — PROGRESS NOTES
Internal Medicine Interval Note    Name Lauren Bowden       1950   Age/Sex 67 y.o. female   MRN [id number]   Code Status FULL     After 5PM or if no immediate response to page, please call for cross-coverage  Attending/Team: Dr. Wall/JUAN Avila See Patient List for primary contact information  Call [phone] to page    1st Call - Day Intern (R1):   Dr. Haynes 2nd Call - Day Sr. Resident (R2/R3):   Dr. Hi         Reason for interval visit  (Principal Problem)   <principal problem not specified>    Interval Problem Daily Status Update  (24 hours)     Pyelonephritis- exam improved today, feels better, still with some dysuria today, afebrile without leukocytosis  Hypertension- stable today  Adnexal mass- will attempt to call GYN to schedule outpatient appointment for patient      Review of Systems   Constitutional: Negative for fever and chills.   Respiratory: Negative for cough.    Cardiovascular: Negative for chest pain and palpitations.   Genitourinary: Positive for dysuria and hematuria.   Musculoskeletal: Positive for back pain. Negative for myalgias.   Neurological: Negative for headaches.       Consultants/Specialty  None    Disposition  Home once susceptibilities/culture available on oral abx    Quality Measures  Labs reviewed and Medications reviewed  Melton catheter: No Melton      DVT: current hematuria.  DVT prophylaxis - mechanical: SCDs  Ulcer prophylaxis: Not indicated  Antibiotics: Treating active infection/contamination beyond 24 hours perioperative coverage            Physical Exam       Filed Vitals:    17 0654 17 1035 17 1128 17 1205   BP:  139/62  124/45   Pulse: 89 79 92 84   Temp:  36.7 °C (98 °F)  36.8 °C (98.2 °F)   Resp: 16 14 16 14   Height:       Weight:       SpO2: 94% 93% 91% 94%     Body mass index is 33.4 kg/(m^2). Weight: 88.3 kg (194 lb 10.7 oz)  Oxygen Therapy:  Pulse Oximetry: 94 %, O2 (LPM): 2, O2 Delivery: Silicone Nasal Cannula    Physical  Exam   Constitutional: She is oriented to person, place, and time and well-developed, well-nourished, and in no distress. No distress.   Cardiovascular: Normal rate, regular rhythm and normal heart sounds.  Exam reveals no friction rub.    No murmur heard.  Pulmonary/Chest: Effort normal and breath sounds normal. No respiratory distress. She has no wheezes.   Abdominal: Soft. Bowel sounds are normal. She exhibits no distension.   Left lower quadrant pain to deep palpation   Musculoskeletal:   CVA tenderness on right site   Neurological: She is alert and oriented to person, place, and time.   Skin: She is not diaphoretic.   Psychiatric: Affect normal.         Lab Data Review:         8/11/2017  1:39 PM    Recent Labs      08/10/17   0910  08/11/17   0316   SODIUM  131*  133*   POTASSIUM  4.1  4.6   CHLORIDE  101  103   CO2  21  22   BUN  16  19   CREATININE  1.58*  1.58*   CALCIUM  9.4  8.6       Recent Labs      08/10/17   0910  08/11/17   0316   ALTSGPT  56*  36   ASTSGOT  45  24   ALKPHOSPHAT  100*  97   TBILIRUBIN  0.8  0.3   GLUCOSE  118*  103*       Recent Labs      08/10/17   0910  08/11/17   0316   RBC  4.67  3.82*   HEMOGLOBIN  14.7  12.4   HEMATOCRIT  43.8  38.2   PLATELETCT  165  110*   PROTHROMBTM  13.9   --    APTT  32.5   --    INR  1.04   --        Recent Labs      08/10/17   0910  08/11/17   0316   WBC  10.4  7.2   NEUTSPOLYS  78.80*  70.20   LYMPHOCYTES  9.90*  15.60*   MONOCYTES  9.80  12.80   EOSINOPHILS  0.00  0.30   BASOPHILS  0.50  0.40   ASTSGOT  45  24   ALTSGPT  56*  36   ALKPHOSPHAT  100*  97   TBILIRUBIN  0.8  0.3           Assessment/Plan     Pyelonephritis, acute  Assessment & Plan  - presents with one week of right sided costovertebral pain and dysuria/hematuria  - UA with large occult blood/LE and many bacteria  - s/p CT scan: right side perinephric stranding  - blood cx, urine cx pending  Plan:  - continue ceftriaxone while awaiting culture results  - transition to orals once  sensitivity available  - PRN pain medication      Chronic hypoxemic respiratory failure (CMS-HCC) (present on admission)  Assessment & Plan  - secondary to chronic smoking, uses 1.5-2L at home  - currently at baseline  Plan:  - continue duoneb/symbicort  - Respiratory protocol/O2 protocol      HTN (hypertension) (present on admission)  Assessment & Plan  - currently controlled inpatient  - initially held home felodipine/irbesartan/HCTZ given hypotension  - will continue to monitor blood pressures and add medication as needed    Adnexal mass  Assessment & Plan  - An incidental finding was noted on Renal CT  - 6.8 x 6.8 x 6 cm cystic left adnexal lesion may represent an ovarian cystadenoma or cystadenocarcinoma  - similar appearance on transvaginal ultrasound (spetated hyphae)  Plan   - Follow up with Gynecology outpatient     Chronic kidney disease  Assessment & Plan  - secondary to history of atrophic kidney  - renal function remains at baseline  - continue to avoid nephrotoxic medication    Hyponatremia  Assessment & Plan  - hypovolemic hyponatremia  - improved with IV fluids today  - asymptomatic, Na 131

## 2017-08-11 NOTE — RESPIRATORY CARE
COPD EDUCATION by COPD CLINICAL EDUCATOR  8/11/2017  at  11:23 AM by Guera Nieves     Patient interviewed by COPD education team.  Patient unable to participate in full program.  Short intervention has been conducted.  She has been part of our inpatient program in the past and is comfortable with her Action Plan. We discussed her medication delivery devices and how to use her Respimat inhaler.  She has a nebulizer and medications as well as Oxygen.

## 2017-08-11 NOTE — DOCUMENTATION QUERY
DOCUMENTATION QUERY    PROVIDERS: Please select “Cosign w/ note”to reply to query.    To better represent the severity of illness of your patient, please review the following information and exercise your independent professional judgment in responding to this query.     COPD uses Home Oxygen 1 - 2 Liters is documented in the History and Physical. Based upon the clinical findings, risk factors, and treatment, can this diagnosis be further specified?    • Chronic Respiratory Failure  • Chronic Hypoxemia  • Other explanation of clinical findings  • Unable to determine          The medical record reflects the following:   Clinical Findings  --per H&P : COPD use 1 to 2 Liters at home, RESP 18  97% on 2 Liters    --per ED notes: resp 20 sat 95%, uses Nocturnal O2 At home    --per 8/11 RN Assessment : 94% on 2 liters Oxygen < Mild Work of Breathing, Diminished Lung sounds throughout     Treatment  SYmbicort, Duoneb, RT Protocol ,ROCEPHIN, CEFEPIME, IV Normal Saline 83ml/hr labs , VS, Cultures, Telemetry Monitoring   Risk Factors 67yoF 02 Dependent COPD admitted with Unspecified Pyelonephritis and  Unspecified Adnexal Mass   Location within medical record  History and Physical , ED Notes, Nursing Assessment Notes     Thank you,   GUS Conway  [facility]   [phone]

## 2017-08-11 NOTE — DOCUMENTATION QUERY
"DOCUMENTATION QUERY    PROVIDERS: Please select “Cosign w/ note”to reply to query.    To better represent the severity of illness of your patient, please review the following information and exercise your independent professional judgment in responding to this query.        Pyelonephritis of unspecified Chronicity  treat with Ceftriaxone is documented in the History and Physical. Based upon the clinical findings, risk factors, and treatment, can this diagnosis be further specified?    Please select all that apply:   • Acute Pyelonephritis  With Suspected Probable Sepsis present on admission (specify causative organism if known and any associated organ dysfunction if known)  • Chronic Pyelonephritis with Suspected Probable  Sepsis Present on Admission  • Acute Pyelonephritis without Sepsis Present on Admission  • No  Sepsis Present on Admission (pls. Provide evidence)  • SIRS that is unrelated to any infection  • SIRS of non-infectious origin with acute organ dysfunction  • Other explanation of clinical findings  • Findings of no clinical significance  • Unable to determine      The medical record reflects the following:   Clinical Findings  -->per ED Notes: Sepsis, Acute UTI, Fever   Temp 100.1 Fever, Gen Body Aches, UTI on UACX, Sepsis, , /77    -->Per [facility] H&P :   Unspecified \"Pyelonephritis,  UA consistent with infection, hematuria present  - started ceftriaxone  \"  +  Fever, chills, abdominal pain, diarrhea, hematuria, flank pain, Suprapubic pain, headaches  Admit VS: AXox3  T 100.1  /77  HR 72  RESP 18  97% on 2 Liters    --per WBC  : normal     -per 8/10 URINE ANALYSIS + Cloudy, Large Leukocyte Esterace, Many Bacteria     Treatment  ROCEPHIN, CEFEPIME, IV Normal Saline 83ml/hr labs , VS, Cultures, Telemetry Monitoring, RT protocol  O2 Supplement     Risk Factors  67yoF admitted with Fever,  Unspecified Ovarian Mass, COPD, Pyelopephritis of Unspecified Chronicity   Location within medical " record  History and Physical, ED Notes and Lab Results      Thank you,   Kade Koch [facility]  [facility]   [phone]

## 2017-08-11 NOTE — DOCUMENTATION QUERY
DOCUMENTATION QUERY    PROVIDERS: Please select “Cosign w/ note”to reply to query.    To better represent the severity of illness of your patient, please review the following information and exercise your independent professional judgment in responding to this query.     Sodium levels of 131 - 133 in setting Pyelonephritis with Hematuria is noted in the Lab Results, treated with IV Normal Saline . Based upon the clinical findings, risk factors, and treatment, can a diagnosis be provided to support this finding?    • Hyponatremia  • Findings of no clinical significance   • Other explanation of clinical findings  • Unable to determine (no explanation for clinical findings)        The medical record reflects the following:   Clinical Findings  --> per labs SODIUM 131 - 133   Treatment  IV Normal Saline 83ml/hr, labs , VS, I&O, Telemetry Monitoring, Medical Management of Pyelonephritis, COPD and Adnexal Mass   Risk Factors  67yoCOPDpatient admitted with Unspecified Pyelonephitis/COPD/Adnexal Mass shown to have decrease in Sodium levels   Location within medical record  Lab Results      Thank you,   GUS Conway  [facility] Clinical   [phone]

## 2017-08-11 NOTE — DOCUMENTATION QUERY
DOCUMENTATION QUERY    PROVIDERS: Please select “Cosign w/ note”to reply to query.    To better represent the severity of illness of your patient, please review the following information and exercise your independent professional judgment in responding to this query.     Radiologic findings of Atelectasis in setting of a 67yoF with O2-dependent COPD  is noted in the Radiology Results, treatment Metered-dose Inhalers/O2 supplement per RT Protocol. Based upon the clinical findings, risk factors, and treatment, can a diagnosis be provided to support this finding?    • Agree with radiology finding of __ATELECTASIS_________________  • Disagree with radiology finding of __ATELECTASIS_______________  • Unable to determine  • Other explanation of clinical findings  • Findings of no clinical significance  • Other explanation of clinical findings  • Unable to determine      The medical record reflects the following:   Clinical Findings  --> per 8/10 CXRMild bibasilar atelectasis.Atherosclerotic plaque:  --> per 8/11 RN assessment : 94% on 2 Liters Oxygen with Mild WOrk Of Breathing   Treatment  SYmbicort, ,ROCEPHIN, CEFEPIME, IV Normal Saline 83ml/hr labs , VS, Cultures, Telemetry Monitoring  VSq12, Sanding orders Keep O2 sats >94%, RT /Duoneb/ MDI treatments     Risk Factors  67yoF  1 to 2 Liters Oxygen-dependent COPD admitted with Pyelonephritis and Adnexal Mass   Location within medical record  History and Physical and Nursing Assessment  Notes     Thank you,   Kade Koch ,  RN [facility]  [facility] Clinical   [phone]

## 2017-08-11 NOTE — DISCHARGE PLANNING
Care Transition Team Assessment  Patient lives with her  in a single story home. She states that she is her husbands caregiver and that she also provides their transportation.     Information Source  Orientation : Oriented x 4  Information Given By: Patient  Informant's Name: Lauren  Who is responsible for making decisions for patient? : Patient    Readmission Evaluation  Is this a readmission?: No    Elopement Risk  Legal Hold: No  Ambulatory or Self Mobile in Wheelchair: Yes  Disoriented: No  Psychiatric Symptoms: None  History of Wandering: No  Elopement this Admit: No  Vocalizing Wanting to Leave: No  Displays Behaviors, Body Language Wanting to Leave: No-Not at Risk for Elopement  Elopement Risk: Not at Risk for Elopement    Interdisciplinary Discharge Planning  Does Admitting Nurse Feel This Could be a Complex Discharge?: No  Primary Care Physician:  (nayeli simmons)  Lives with - Patient's Self Care Capacity: Spouse  Patient or legal guardian wants to designate a caregiver (see row info): No  Support Systems: Family Member(s), Friends / Neighbors  Housing / Facility: [address]  Do You Take your Prescribed Medications Regularly: No  Reasons Why Not Taking Medications : Memory Issues  Able to Return to Previous ADL's: Yes  Mobility Issues: No  Prior Services: None  Patient Expects to be Discharged to::  (home)  Assistance Needed: No  Durable Medical Equipment: Home Oxygen    Discharge Preparedness  What is your plan after discharge?: Uncertain - pending medical team collaboration  What are your discharge supports?: Spouse  Prior Functional Level: Uses Walker  Difficulity with ADLs: None  Difficulity with IADLs: None    Functional Assesment  Prior Functional Level: Uses Walker    Finances  Financial Barriers to Discharge: No  Prescription Coverage: Yes    Vision / Hearing Impairment  Vision Impairment : Yes  Right Eye Vision: Wears Glasses  Left Eye Vision: Wears Glasses  Hearing Impairment :  No    Values / Beliefs / Concerns  Values / Beliefs Concerns : No    Advance Directive  Advance Directive?: None  Advance Directive offered?: AD Booklet refused    Domestic Abuse  Have you ever been the victim of abuse or violence?: No    Psychological Assessment  History of Substance Abuse: None  History of Psychiatric Problems: No  Non-compliant with Treatment: No  Newly Diagnosed Illness: No    Discharge Risks or Barriers  Discharge risks or barriers?: No  Patient risk factors: Other (comment)    Anticipated Discharge Information  Anticipated discharge disposition:  (none)  Discharge Address: see facesheet  Discharge Contact Phone Number: see facesheet

## 2017-08-11 NOTE — CARE PLAN
Problem: Bronchoconstriction:  Goal: Improve in air movement and diminished wheezing  Outcome: PROGRESSING AS EXPECTED  Intervention: Implement inhaled treatments  Duo QID  Symbicort BID

## 2017-08-11 NOTE — CARE PLAN
Problem: Infection  Goal: Will remain free from infection  Intervention: Assess signs and symptoms of infection  abx ordered  Intervention: Implement standard precautions and perform hand washing before and after patient contact  done  Intervention: Assess for removal of potential routes of infection, such as IV, central line, intra-arterial or urinary catheters  Only one IV, looks great

## 2017-08-11 NOTE — ASSESSMENT & PLAN NOTE
- secondary to chronic smoking, uses 1.5-2L at home  - currently at baseline  Plan:  - continue duoneb/symbicort  - Respiratory protocol/O2 protocol

## 2017-08-11 NOTE — NON-PROVIDER
"       Internal Medicine Medical Student Note    Name Lauren Bowden       1950   Age/Sex 67 y.o. female   MRN [id number]   Code Status FULL     After 5PM or if no immediate response to page, please call for cross-coverage  Attending/Team: Red/Yellow See Patient List for primary contact information  Call [phone] to page after hours   1st Call - Day Intern (R1):   Dr. Haynes 2nd Call - Day Sr. Resident (R2/R3):   Dr. Hi         Reason for interval visit  (Principal Problem)   67F presented yesterday to the ER with 1 week history of progressively worsening right flank pain, hematuria, and left suprapubic tenderness. She endorses subjective fever, chills and headache. UA showed many bacteria with large occult blood and CT suggested pyelonephritis, for which she is being treated with ceftriaxone. CT revealed solitary pulmonary nodule and 6.8 x 6.8 x 6 cm left adnexal mass.    Interval Problem Daily Status Update  (24 hours)   Patient continues to feel \"much better\" overnight and denies persistence of constitutional symptoms such as fever, chills, nausea, or diarrhea. She has never experienced burning with urination. She endorses improvement of her hematuria and suprapubic discomfort. Her flank pain has also reduced greatly overnight but she thinks this may be due to her pain medication. Last BM was loose 2 days ago without kylie blood.    Review of Systems   Constitutional: Negative for fever, chills, weight loss, malaise/fatigue and diaphoresis.   Respiratory: Negative for cough, sputum production and shortness of breath.    Cardiovascular: Positive for orthopnea. Negative for chest pain and palpitations.   Gastrointestinal: Positive for abdominal pain. Negative for nausea, vomiting, diarrhea, constipation and blood in stool.   Genitourinary: Negative for dysuria.   Neurological: Negative for weakness and headaches.         Physical Exam       Filed Vitals:    17 0654 17 1035 17 1128 " "08/11/17 1205   BP:  139/62  124/45   Pulse: 89 79 92 84   Temp:  36.7 °C (98 °F)  36.8 °C (98.2 °F)   Resp: 16 14 16 14   Height:       Weight:       SpO2: 94% 93% 91% 94%     Body mass index is 33.4 kg/(m^2). Weight: 88.3 kg (194 lb 10.7 oz)  Oxygen Therapy:  Pulse Oximetry: 94 %, O2 (LPM): 2, O2 Delivery: Silicone Nasal Cannula    Physical Exam   Constitutional: She is oriented to person, place, and time and well-developed, well-nourished, and in no distress. No distress.   HENT:   Head: Normocephalic and atraumatic.   Eyes: No scleral icterus.   Cardiovascular: Normal rate, regular rhythm and normal heart sounds.  Exam reveals no gallop and no friction rub.    No murmur heard.  Pulmonary/Chest: Effort normal and breath sounds normal. No respiratory distress. She has no wheezes. She has no rales.   Abdominal: Soft. Bowel sounds are normal. She exhibits no distension. There is tenderness.   Mild LLQ discomfort to palpation.   Genitourinary:   Did not assess. There was no vaginal bleeding or discharge on admission   Lymphadenopathy:     She has no cervical adenopathy.   Neurological: She is alert and oriented to person, place, and time.   Skin: Skin is warm. No rash noted. She is not diaphoretic. No erythema.       Assessment/Plan     1. Pyelonephritis, acute.  One week right flank \"10/10\" pain with hematuria, some suprapubic tenderness, UA positive for Leukocyte esterase and many bacteria, and CT perinephric streaking are all strongly indicative of pyelonephritis. Urine culture this morning grew lactose-fermenting Gram negative rods. Patient's constitutional symptoms have resolved and she feels well. Creatinine is baseline for her compared to labs drawn last year.  -Continue C3 2g in [id number] IV  -We will continue to monitor for improvement.  -Oxycodone 5mg prn max q6h    2. Left adnexal cyst  During CT assessment for pyelonephritis, a left adnexal mass was identified measuring 6.8 x 6.8 x 6 cm. Transvaginal US " confirms findings. We discussed findings with the patient, stating that and that follow up outpatient with GYN in [place] would be the best course of action. It does not seem to be causing problems for her currently. She denies weight loss. Dr. Scarlett Braun's office was contacted this afternoon but they are out of the office until Monday.  -Establish f/u o/p appt for further evaluation.    3. Pulmonary nodule  Incidental finding on same CT. Nodule is solitary, 5mm and is located in the right middle lobe. No prior imaging available for comparison. She has a significant smoking history that puts her at high risk for lung cancer. Based on Fleischner Society 2017 Guidelines for Management of Incidentally Detected Pulmonary Nodules in Adults, she can optionally follow up with CT in 12 months. It is not causing problems at this time.     4. HTN  Currently controlled inpatient, home medications are held.   -consider reinitiating home med regimen if worsens.    5. CKD  Known history, patient is currently at baseline renal function. Atrophic left kidney.   -ctm  -Avoid nephrotoxic medications/contrast.

## 2017-08-11 NOTE — PROGRESS NOTES
Ghazala Owusu Fall Risk Assessment:     Last Known Fall: No falls  Mobility: No limitations  Medications: No meds  Mental Status/LOC/Awareness: Awake, alert, and oriented to date, place, and person  Toileting Needs: No needs  Volume/Electrolyte Status: Use of IV fluids/tube feeds  Communication/Sensory: No deficits  Behavior: Appropriate behavior  Ghazala Owusu Fall Risk Total: 5  Fall Risk Level: NO RISK    Universal Fall Precautions:  call light/belongings in reach, bed in low position and locked, wheelchairs and assistive devices out of sight, siderails up x 2, use non-slip footwear, adequate lighting, clutter free and spill free environment, educate on level of risk, educate to call for assistance    Fall Risk Level Interventions:          Patient Specific Interventions:     Medication: review medications with patient and family  Mental Status/LOC/Awareness: provide activity  Toileting: monitor intake and output/use of appropriate interventions  Volume/Electrolyte Status: not applicable  Communication/Sensory: update plan of care on whiteboard  Behavioral: not applicable  Mobility: not applicable

## 2017-08-11 NOTE — ASSESSMENT & PLAN NOTE
- currently controlled inpatient  - initially held home felodipine/irbesartan/HCTZ given hypotension  - will continue to monitor blood pressures and add medication as needed

## 2017-08-11 NOTE — PROGRESS NOTES
Ghazala Owusu Fall Risk Assessment:     Last Known Fall: No falls  Mobility: No limitations  Medications: No meds  Mental Status/LOC/Awareness: Awake, alert, and oriented to date, place, and person  Toileting Needs: No needs  Volume/Electrolyte Status: Use of IV fluids/tube feeds  Communication/Sensory: No deficits  Behavior: Appropriate behavior  Ghazala Owusu Fall Risk Total: 5  Fall Risk Level: NO RISK    Universal Fall Precautions:  call light/belongings in reach, bed in low position and locked, wheelchairs and assistive devices out of sight, siderails up x 2, use non-slip footwear, adequate lighting, clutter free and spill free environment, educate on level of risk, educate to call for assistance    Fall Risk Level Interventions:          Patient Specific Interventions:     Medication: review medications with patient and family  Mental Status/LOC/Awareness: not applicable  Toileting: not applicable  Volume/Electrolyte Status: advance diet as tolerated  Communication/Sensory: update plan of care on whiteboard  Behavioral: engage patient in daily activities  Mobility: not applicable

## 2017-08-11 NOTE — CARE PLAN
Problem: Infection  Goal: Will remain free from infection  Outcome: PROGRESSING AS EXPECTED  Labs look better  Intervention: Assess signs and symptoms of infection  Infection seems to be clearing, less pain  Intervention: Implement standard precautions and perform hand washing before and after patient contact  done  Intervention: Assess for removal of potential routes of infection, such as IV, central line, intra-arterial or urinary catheters  IV CDI

## 2017-08-11 NOTE — CARE PLAN
Problem: Bronchoconstriction:  Goal: Improve in air movement and diminished wheezing  Outcome: PROGRESSING AS EXPECTED  Intervention: Implement inhaled treatments  QUO QID, Symbicort BID, pt appropriate for DUO PRN

## 2017-08-12 VITALS
HEIGHT: 64 IN | DIASTOLIC BLOOD PRESSURE: 48 MMHG | SYSTOLIC BLOOD PRESSURE: 119 MMHG | TEMPERATURE: 97 F | OXYGEN SATURATION: 95 % | WEIGHT: 189.15 LBS | HEART RATE: 68 BPM | BODY MASS INDEX: 32.29 KG/M2 | RESPIRATION RATE: 18 BRPM

## 2017-08-12 LAB
BACTERIA UR CULT: ABNORMAL
BACTERIA UR CULT: ABNORMAL
BASOPHILS # BLD AUTO: 0.5 % (ref 0–1.8)
BASOPHILS # BLD: 0.02 K/UL (ref 0–0.12)
EOSINOPHIL # BLD AUTO: 0.06 K/UL (ref 0–0.51)
EOSINOPHIL NFR BLD: 1.5 % (ref 0–6.9)
ERYTHROCYTE [DISTWIDTH] IN BLOOD BY AUTOMATED COUNT: 47.8 FL (ref 35.9–50)
HCT VFR BLD AUTO: 35.2 % (ref 37–47)
HGB BLD-MCNC: 11.3 G/DL (ref 12–16)
IMM GRANULOCYTES # BLD AUTO: 0.02 K/UL (ref 0–0.11)
IMM GRANULOCYTES NFR BLD AUTO: 0.5 % (ref 0–0.9)
LYMPHOCYTES # BLD AUTO: 1.01 K/UL (ref 1–4.8)
LYMPHOCYTES NFR BLD: 25.2 % (ref 22–41)
MCH RBC QN AUTO: 32 PG (ref 27–33)
MCHC RBC AUTO-ENTMCNC: 32.1 G/DL (ref 33.6–35)
MCV RBC AUTO: 99.7 FL (ref 81.4–97.8)
MONOCYTES # BLD AUTO: 0.6 K/UL (ref 0–0.85)
MONOCYTES NFR BLD AUTO: 15 % (ref 0–13.4)
NEUTROPHILS # BLD AUTO: 2.3 K/UL (ref 2–7.15)
NEUTROPHILS NFR BLD: 57.3 % (ref 44–72)
NRBC # BLD AUTO: 0 K/UL
NRBC BLD AUTO-RTO: 0 /100 WBC
PLATELET # BLD AUTO: 103 K/UL (ref 164–446)
PMV BLD AUTO: 10.2 FL (ref 9–12.9)
RBC # BLD AUTO: 3.53 M/UL (ref 4.2–5.4)
SIGNIFICANT IND 70042: ABNORMAL
SITE SITE: ABNORMAL
SOURCE SOURCE: ABNORMAL
WBC # BLD AUTO: 4 K/UL (ref 4.8–10.8)

## 2017-08-12 PROCEDURE — A9270 NON-COVERED ITEM OR SERVICE: HCPCS | Performed by: INTERNAL MEDICINE

## 2017-08-12 PROCEDURE — 36415 COLL VENOUS BLD VENIPUNCTURE: CPT

## 2017-08-12 PROCEDURE — 85025 COMPLETE CBC W/AUTO DIFF WBC: CPT

## 2017-08-12 PROCEDURE — 700102 HCHG RX REV CODE 250 W/ 637 OVERRIDE(OP): Performed by: STUDENT IN AN ORGANIZED HEALTH CARE EDUCATION/TRAINING PROGRAM

## 2017-08-12 PROCEDURE — 94760 N-INVAS EAR/PLS OXIMETRY 1: CPT

## 2017-08-12 PROCEDURE — 700102 HCHG RX REV CODE 250 W/ 637 OVERRIDE(OP): Performed by: INTERNAL MEDICINE

## 2017-08-12 PROCEDURE — 94640 AIRWAY INHALATION TREATMENT: CPT

## 2017-08-12 PROCEDURE — 99239 HOSP IP/OBS DSCHRG MGMT >30: CPT | Mod: GC | Performed by: INTERNAL MEDICINE

## 2017-08-12 PROCEDURE — 700101 HCHG RX REV CODE 250: Performed by: EMERGENCY MEDICINE

## 2017-08-12 PROCEDURE — 700101 HCHG RX REV CODE 250

## 2017-08-12 PROCEDURE — 700111 HCHG RX REV CODE 636 W/ 250 OVERRIDE (IP): Performed by: STUDENT IN AN ORGANIZED HEALTH CARE EDUCATION/TRAINING PROGRAM

## 2017-08-12 PROCEDURE — 700105 HCHG RX REV CODE 258: Performed by: STUDENT IN AN ORGANIZED HEALTH CARE EDUCATION/TRAINING PROGRAM

## 2017-08-12 PROCEDURE — A9270 NON-COVERED ITEM OR SERVICE: HCPCS | Performed by: STUDENT IN AN ORGANIZED HEALTH CARE EDUCATION/TRAINING PROGRAM

## 2017-08-12 RX ORDER — SULFAMETHOXAZOLE AND TRIMETHOPRIM 800; 160 MG/1; MG/1
1 TABLET ORAL EVERY 12 HOURS
Qty: 22 TAB | Refills: 0 | Status: SHIPPED | OUTPATIENT
Start: 2017-08-12 | End: 2017-08-12

## 2017-08-12 RX ORDER — BISACODYL 10 MG
10 SUPPOSITORY, RECTAL RECTAL DAILY
Status: DISCONTINUED | OUTPATIENT
Start: 2017-08-12 | End: 2017-08-12 | Stop reason: HOSPADM

## 2017-08-12 RX ORDER — IPRATROPIUM BROMIDE AND ALBUTEROL SULFATE 2.5; .5 MG/3ML; MG/3ML
3 SOLUTION RESPIRATORY (INHALATION)
Status: DISCONTINUED | OUTPATIENT
Start: 2017-08-12 | End: 2017-08-12 | Stop reason: HOSPADM

## 2017-08-12 RX ORDER — AMOXICILLIN 250 MG
1 CAPSULE ORAL DAILY
Status: DISCONTINUED | OUTPATIENT
Start: 2017-08-12 | End: 2017-08-12 | Stop reason: HOSPADM

## 2017-08-12 RX ORDER — SULFAMETHOXAZOLE AND TRIMETHOPRIM 800; 160 MG/1; MG/1
1 TABLET ORAL EVERY 12 HOURS
Status: DISCONTINUED | OUTPATIENT
Start: 2017-08-12 | End: 2017-08-12 | Stop reason: HOSPADM

## 2017-08-12 RX ORDER — CEFDINIR 300 MG/1
300 CAPSULE ORAL 2 TIMES DAILY
Qty: 22 CAP | Refills: 0 | OUTPATIENT
Start: 2017-08-12 | End: 2018-10-11

## 2017-08-12 RX ORDER — IPRATROPIUM BROMIDE AND ALBUTEROL SULFATE 2.5; .5 MG/3ML; MG/3ML
SOLUTION RESPIRATORY (INHALATION)
Status: COMPLETED
Start: 2017-08-12 | End: 2017-08-12

## 2017-08-12 RX ORDER — SULFAMETHOXAZOLE AND TRIMETHOPRIM 800; 160 MG/1; MG/1
1 TABLET ORAL EVERY 12 HOURS
Status: DISCONTINUED | OUTPATIENT
Start: 2017-08-12 | End: 2017-08-12

## 2017-08-12 RX ADMIN — BUPROPION HYDROCHLORIDE 150 MG: 150 TABLET, EXTENDED RELEASE ORAL at 08:35

## 2017-08-12 RX ADMIN — MAGNESIUM HYDROXIDE 30 ML: 400 SUSPENSION ORAL at 08:34

## 2017-08-12 RX ADMIN — CLOPIDOGREL 75 MG: 75 TABLET, FILM COATED ORAL at 08:35

## 2017-08-12 RX ADMIN — OMEPRAZOLE 20 MG: 20 CAPSULE, DELAYED RELEASE ORAL at 08:35

## 2017-08-12 RX ADMIN — CYCLOBENZAPRINE 10 MG: 10 TABLET, FILM COATED ORAL at 11:27

## 2017-08-12 RX ADMIN — IPRATROPIUM BROMIDE AND ALBUTEROL SULFATE 3 ML: .5; 3 SOLUTION RESPIRATORY (INHALATION) at 04:36

## 2017-08-12 RX ADMIN — IPRATROPIUM BROMIDE AND ALBUTEROL SULFATE 3 ML: .5; 3 SOLUTION RESPIRATORY (INHALATION) at 11:22

## 2017-08-12 RX ADMIN — SULFAMETHOXAZOLE AND TRIMETHOPRIM 1 TABLET: 800; 160 TABLET ORAL at 12:40

## 2017-08-12 RX ADMIN — STANDARDIZED SENNA CONCENTRATE AND DOCUSATE SODIUM 1 TABLET: 8.6; 5 TABLET, FILM COATED ORAL at 08:34

## 2017-08-12 RX ADMIN — RALOXIFENE HYDROCHLORIDE 60 MG: 60 TABLET, FILM COATED ORAL at 10:24

## 2017-08-12 RX ADMIN — IPRATROPIUM BROMIDE AND ALBUTEROL SULFATE 3 ML: .5; 3 SOLUTION RESPIRATORY (INHALATION) at 07:24

## 2017-08-12 RX ADMIN — BUDESONIDE AND FORMOTEROL FUMARATE DIHYDRATE 2 PUFF: 160; 4.5 AEROSOL RESPIRATORY (INHALATION) at 07:24

## 2017-08-12 RX ADMIN — CEFTRIAXONE 2 G: 2 INJECTION, POWDER, FOR SOLUTION INTRAMUSCULAR; INTRAVENOUS at 10:23

## 2017-08-12 ASSESSMENT — ENCOUNTER SYMPTOMS
COUGH: 0
PALPITATIONS: 0
WEIGHT LOSS: 0
VOMITING: 0
HEADACHES: 0
FEVER: 0
WEAKNESS: 0
SPUTUM PRODUCTION: 0
ORTHOPNEA: 1
BLOOD IN STOOL: 0
CHILLS: 0
CONSTIPATION: 0
DIAPHORESIS: 0
SHORTNESS OF BREATH: 0
DIARRHEA: 0
ABDOMINAL PAIN: 0
NAUSEA: 0

## 2017-08-12 ASSESSMENT — PAIN SCALES - GENERAL: PAINLEVEL_OUTOF10: 2

## 2017-08-12 ASSESSMENT — LIFESTYLE VARIABLES: EVER_SMOKED: YES

## 2017-08-12 NOTE — PROGRESS NOTES
Provided patient with discharge packet. Printed script provided for abx. abx schedule, indication, and educational materials provided. Follow up care verified. Patient verbalized understanding. All questions and concerns addressed. Patient discharged in stable condition

## 2017-08-12 NOTE — DISCHARGE SUMMARY
Internal Medicine Discharge Summary      Admit Date:  8/10/2017       Discharge Date:   8/12/2017    Service:   [facility] Internal Medicine Red Team  Attending Physician(s):   Dr. Wall       Senior Resident(s):   Dr. Hi  Janak Resident(s):   Dr. Haynes      Primary Diagnosis:   Acute pyelonephritis secondary to Klebsiella Pneumonia infection    Secondary Diagnoses:                Active Problems:    Pyelonephritis, acute    Chronic hypoxemic respiratory failure (CMS-HCC) POA: Yes      Overview: Controlled with inhalers.    HTN (hypertension) POA: Yes      Overview: Controlled      Doesn't check at home    Adnexal mass POA: Unknown      Overview: Incidental finding of the CT Renal       - 6.8 x 6.8 x 6 cm cystic left adnexal lesion may represent an ovarian       cystadenoma or cystadenocarcinoma.     Chronic kidney disease POA: Unknown    Hyponatremia POA: Unknown    Lung nodule, multiple POA: Unknown  Resolved Problems:    * No resolved hospital problems. *      Hospital Summary (Brief Narrative):         68 y/o female who presented with one week of dysuria/hematuria and flank pain. She was found in the ED on CT scan to have some right perinephric stranding and known left side atrophic kidney. She did not initially meet SIRS criteria with only a tachycardia on arrival but did have urinalysis concerning for infection with many bacteria, 10-20 WBC, 100-150 RBC and was initially started on cefepime with transition to ceftriaxone once admitted. Over the course of the next couple days her dysuria and hematuria improved and had minimal CVA tenderness. Her blood cultures taken on arrival remained negative and her urine cultures were positive for Klebsiella pneumonia which was sensitive to bactrim. She will be discharged with cefdinir 300 mg BID (CrCl 36, no renal adjustment necessary) to take to finish a 14 day course of antibiotic therapy on August 23rd.    Incidentally found on her CT scan done for apparent  renal colic she was noted to have a 6.8 cm x 6.8 cm x 6 cm left cystic adnexal lesion which was further investigated with transvaginal ultrasound which showed fine septation but concern for cystadenoma vs cystadenocarcinoma. Discussed with on call GYN (Dr. Rodriguez) who recommended close outpatient follow up and patient will be given number to make appointment. Otherwise CT scan picked up two separate 5 mm right lower lobe nodules that will need to be followed up with optional CT scan in 12 months per Fleischner criteria given her smoking history. Patient is aware of both findings.      Patient /Hospital Summary (Details -- Problem Oriented) :          Pyelonephritis, acute  Hospital course as above. To finish cefdinir on August 23rd to complete a 14 day course of antibiotics. She was encouraged to follow up with her PCP (Dr. Ordaz) in the interim. DC summary will be routed to office. She was encouraged to watch for any worsening hematuria or dysuria.    Adnexal mass  Incidentally found as above. Will need close follow up with gynecology and patient was given number to [facility] to schedule appointment. Office closed and was not able to make appointment while inpatient.       HTN (hypertension)  Patient with blood pressure control despite being off medication while inpatient. On discharge she was instructed to continue her felodipine 5 mg daily and stop her irbesartan/HCTZ. She does have a blood pressure cuff at home and will measure BP and follow up with PCP. If needed irbesartan/HCTZ can be added as an outpatient.       Hyponatremia  Her hyponatremia was resolving with fluids and was 133 on discharge. Encouraged to stay hydrated. This can be followed up by PCP.       Lung nodule, multiple  Incidental finding as above. Two separate 5 mm right middle lobe nodules seen on CT renal colic evaluation. Given 40 pack year smoking history, per Fleischner criteria needs optional 12 month follow up. Patient aware.        Consultants:     Spoke with GYN (Dr. Rodriguez) about adnexal mass    Procedures:        none    Imaging/ Testing:      US-GYN-PELVIS TRANSVAGINAL   Final Result      6.8 cm cystic left adnexal lesion may represent an ovarian cystadenoma or cystadenocarcinoma. Gynecological evaluation recommended.      Right ovary not visualized.      Inhomogeneous appearance of the uterus.      CT-RENAL COLIC EVALUATION(A/P W/O)   Final Result      Asymmetric perinephric stranding on the right. This can be seen in the setting of pyelonephritis. Correlation with urinalysis is recommended.      Renal cortical scarring seen on the right.      Atrophic left kidney.      Minimal prominence of the right renal collecting system.      Hepatic steatosis.      Colonic diverticulosis.      6.8 x 6.8 x 6 cm cystic left adnexal lesion may represent an ovarian cystadenoma or cystadenocarcinoma. Further evaluation with pelvic ultrasound is recommended. Gynecological evaluation is recommended.      5 mm right middle lobe pulmonary nodules.      Low Risk: No routine follow-up      High Risk: Optional CT at 12 months      Comments: Use most suspicious nodule as guide to management. Follow-up intervals may vary according to size and risk.      Low Risk - Minimal or absent history of smoking and of other known risk factors.      High Risk - History of smoking or of other known risk factors.      Note: These recommendations do not apply to lung cancer screening, patients with immunosuppression, or patients with known primary cancer.      Fleischner Society 2017 Guidelines for Management of Incidentally Detected Pulmonary Nodules in Adults            DX-CHEST-PORTABLE (1 VIEW)   Final Result      Mild bibasilar atelectasis.      Atherosclerotic plaque.            Discharge Medications:         Medication Reconciliation: Completed       Medication List      START taking these medications       Instructions    cefdinir 300 MG Caps   Commonly known as:   OMNICEF    Take 1 Cap by mouth 2 times a day.   Dose:  300 mg         CONTINUE taking these medications       Instructions    albuterol-ipratropium  MCG/ACT Aero   Commonly known as:  COMBIVENT    Inhale 2 Puffs by mouth 4 times a day.   Dose:  2 Puff       buPROPion  MG Tb12 sustained-release tablet   Last time this was given:  150 mg on 8/12/2017  8:35 AM   Commonly known as:  WELLBUTRIN-SR    Take 1 Tab by mouth every day.   Dose:  150 mg       clopidogrel 75 MG Tabs   Last time this was given:  75 mg on 8/12/2017  8:35 AM   Commonly known as:  PLAVIX    Take 1 tablet by mouth  every day       cyclobenzaprine 10 MG Tabs   Last time this was given:  10 mg on 8/12/2017 11:27 AM   Commonly known as:  FLEXERIL    Take 1 Tab by mouth 3 times a day as needed. FOR MILD PAIN.   Dose:  10 mg       felodipine 5 MG Tb24   Commonly known as:  PLENDIL    Take 10 mg by mouth every day.   Dose:  10 mg       fluticasone-salmeterol 250-50 MCG/DOSE Aepb   Commonly known as:  ADVAIR DISKUS    Inhale 1 Puff by mouth every 12 hours.   Dose:  1 Puff       omeprazole 20 MG delayed-release capsule   Last time this was given:  20 mg on 8/12/2017  8:35 AM   Commonly known as:  PRILOSEC    Take 1 Cap by mouth every day.   Dose:  20 mg       raloxifene 60 MG Tabs   Last time this was given:  60 mg on 8/12/2017 10:24 AM   Commonly known as:  EVISTA    Take 1 Tab by mouth every day.   Dose:  60 mg         STOP taking these medications          irbesartan-hydrochlorothiazide 150-12.5 MG per tablet   Commonly known as:  AVALIDE                    Disposition:  Home with PCP follow up    Diet:   Renal diet    Activity:   As tolerated    Instructions:         The patient was instructed to return to the ER in the event of worsening symptoms. I have counseled the patient on the importance of compliance and the patient has agreed to proceed with all medical recommendations and follow up plan indicated above.   The patient understands  that all medications come with benefits and risks. Risks may include permanent injury or death and these risks can be minimized with close reassessment and monitoring.        Primary Care Provider:    Flaquito Ordaz M.D.    Discharge summary faxed to primary care provider:  Completed        Follow up appointment details :      Follow up with PCP in one week  Follow up with GYN in 2 weeks    Pending Studies:        Finalized blood cultures, remained with no growth to date    Time spent on discharge day patient visit, preparing discharge paperwork and arranging for patient follow up.    Summary of follow up issues:   Follow up adnexal cyst, resolution of pyelo symptoms, lung nodules, hypertension    Discharge Time (Minutes) :    60 minutes        Condition on Discharge    ______________________________________________________________________    Interval history/exam for day of discharge:     Patient feels great this morning, no distress, pleasant and cooperative. States she does not require any pain medication on discharge.    Filed Vitals:    08/12/17 0437 08/12/17 0700 08/12/17 0724 08/12/17 1124   BP:  119/48     Pulse: 65 67 63 68   Temp:  36.1 °C (97 °F)     Resp: 18 16 17 18   Height:       Weight:       SpO2: 96% 93% 94% 95%     Weight/BMI: Body mass index is 32.45 kg/(m^2).  Pulse Oximetry: 95 %, O2 (LPM): 2, O2 Delivery: Nasal Cannula    General: Awake and alert, no distress, pleasant and cooperative  CVS: RRR, S1 and S2 physiologic, no S3, no murmur  PULM: CTAB  ABD: no CVA tenderness  EXT: no edema    Most Recent Labs:    Lab Results   Component Value Date/Time    WBC 4.0* 08/12/2017 02:05 AM    RBC 3.53* 08/12/2017 02:05 AM    HEMOGLOBIN 11.3* 08/12/2017 02:05 AM    HEMATOCRIT 35.2* 08/12/2017 02:05 AM    MCV 99.7* 08/12/2017 02:05 AM    MCH 32.0 08/12/2017 02:05 AM    MCHC 32.1* 08/12/2017 02:05 AM    MPV 10.2 08/12/2017 02:05 AM    NEUTROPHILS-POLYS 57.30 08/12/2017 02:05 AM    LYMPHOCYTES 25.20  08/12/2017 02:05 AM    MONOCYTES 15.00* 08/12/2017 02:05 AM    EOSINOPHILS 1.50 08/12/2017 02:05 AM    BASOPHILS 0.50 08/12/2017 02:05 AM      Lab Results   Component Value Date/Time    SODIUM 133* 08/11/2017 03:16 AM    POTASSIUM 4.6 08/11/2017 03:16 AM    CHLORIDE 103 08/11/2017 03:16 AM    CO2 22 08/11/2017 03:16 AM    GLUCOSE 103* 08/11/2017 03:16 AM    BUN 19 08/11/2017 03:16 AM    CREATININE 1.58* 08/11/2017 03:16 AM    BUN-CREATININE RATIO 16 08/22/2013 10:28 AM      Lab Results   Component Value Date/Time    ALT(SGPT) 36 08/11/2017 03:16 AM    AST(SGOT) 24 08/11/2017 03:16 AM    ALKALINE PHOSPHATASE 97 08/11/2017 03:16 AM    TOTAL BILIRUBIN 0.3 08/11/2017 03:16 AM    ALBUMIN 3.3 08/11/2017 03:16 AM    GLOBULIN 2.9 08/11/2017 03:16 AM    INR 1.04 08/10/2017 09:10 AM     Lab Results   Component Value Date/Time    PT 13.9 08/10/2017 09:10 AM    INR 1.04 08/10/2017 09:10 AM

## 2017-08-12 NOTE — CARE PLAN
Problem: Bronchoconstriction:  Goal: Improve in air movement and diminished wheezing  DUO QID   SYM BID    Problem: Oxygenation:  Goal: Maintain adequate oxygenation dependent on patient condition  2LNC

## 2017-08-12 NOTE — NON-PROVIDER
Internal Medicine Medical Student Note      Name Lauren Bowden       1950   Age/Sex 67 y.o. female   MRN [id number]   Code Status Full     After 5PM or if no immediate response to page, please call for cross-coverage  Attending/Team: Dr. Wall See Patient List for primary contact information  Call [phone] to page after hours   1st Call - Day Intern (R1):   Dr. Haynes 2nd Call - Day Sr. Resident (R2/R3):   Dr. Hi         Reason for interval visit  (Principal Problem)   Pyelonephritis  67F presented yesterday to the ER with 1 week history of progressively worsening right flank pain, hematuria, and left suprapubic tenderness. She endorses subjective fever, chills and headache. UA showed many bacteria with large occult blood and CT suggested pyelonephritis, for which she is being treated with ceftriaxone. CT revealed solitary pulmonary nodule and 6.8 x 6.8 x 6 cm left adnexal mass.    Interval Problem Daily Status Update  (24 hours)   Patient was feeling very upset about being transferred to a shared room, also stating that the hospital layout is too complicated for her  to come and visit. She is working to call him and postpone visiting. In terms of her own health she states that her pain continues to downtrend and is minimal at present. Her urine appears mildly concentrated but without kylie blood. She denies recurrence of constitutional symptoms as well as new complaints.    Review of Systems   Constitutional: Negative for fever, chills, weight loss, malaise/fatigue and diaphoresis.   Respiratory: Negative for cough, sputum production and shortness of breath.    Cardiovascular: Positive for orthopnea. Negative for chest pain and palpitations.        Baseline orthopnea, she uses 2 pillows at home.   Gastrointestinal: Negative for nausea, vomiting, abdominal pain, diarrhea, constipation and blood in stool.   Genitourinary: Negative for dysuria.   Neurological: Negative for weakness and  "headaches.       Physical Exam       Filed Vitals:    08/12/17 0400 08/12/17 0437 08/12/17 0700 08/12/17 0724   BP: 112/53  119/48    Pulse: 70 65 67 63   Temp: 35.8 °C (96.4 °F)  36.1 °C (97 °F)    Resp: 16 18 16 17   Height:       Weight:       SpO2: 95% 96% 93% 94%     Body mass index is 32.45 kg/(m^2). Weight: 85.8 kg (189 lb 2.5 oz)  Oxygen Therapy:  Pulse Oximetry: 94 %, O2 (LPM): 2, O2 Delivery: Nasal Cannula    Physical Exam   Constitutional: She is oriented to person, place, and time and well-developed, well-nourished, and in no distress. No distress.   Was upset over room transfer but quickly was able to  converse pleasantly with me regarding her health.   HENT:   Head: Normocephalic and atraumatic.   Eyes: No scleral icterus.   Cardiovascular: Normal rate, regular rhythm and normal heart sounds.  Exam reveals no gallop and no friction rub.    No murmur heard.  Pulmonary/Chest: Effort normal and breath sounds normal. No respiratory distress. She has no wheezes. She has no rales.   Abdominal: Soft. Bowel sounds are normal. She exhibits no distension. There is no tenderness.   No LLQ discomfort to palpation.   Genitourinary:   Did not assess. There was no vaginal bleeding or discharge on admission   Lymphadenopathy:     She has no cervical adenopathy.   Neurological: She is alert and oriented to person, place, and time.   Skin: Skin is warm and dry. No rash noted. She is not diaphoretic. No erythema.       Assessment/Plan       1. Pyelonephritis, acute.  One week right flank \"10/10\" pain with hematuria, some suprapubic tenderness, UA positive for Leukocyte esterase and many bacteria, and CT perinephric streaking are all strongly indicative of pyelonephritis. Urine culture  grew lactose-fermenting Gram negative rods, awatiing sensitivities. Patient's constitutional symptoms have resolved and she feels well with minimal right flank pain. Creatinine is baseline for her compared to labs drawn last " year.  -Continue C3 2g in [id number] IV  -Switch to oral abx pending sensitivity testing on urine culture.  -We will continue to monitor for improvement.  -Oxycodone 5mg prn max q6h,    2. Left adnexal cyst  During CT assessment for pyelonephritis, a left adnexal mass was identified measuring 6.8 x 6.8 x 6 cm confirmed by transvaginal US. Findings with patient and that follow up outpatient with GYN in [place] would be the best course of action. It does not seem to be causing problems for her currently. She denies weight loss. Dr. Scarlett Braun's office was contacted and they are out of the office until Monday.  -Establish f/u o/p appt for further evaluation.    3. Pulmonary nodule  Incidental finding on same CT. Nodule is solitary, 5mm and is located in the right middle lobe. No prior imaging available for comparison. She has a significant smoking history that puts her at high risk for lung cancer. Based on Fleischner Society 2017 Guidelines for Management of Incidentally Detected Pulmonary Nodules in Adults, she can optionally follow up with CT in 12 months. It is not causing problems at this time.     4. HTN  Currently controlled inpatient, home medications are held.    -consider reinitiating home med regimen if worsens.    5. CKD  Known history, patient is currently at baseline renal function. Atrophic left kidney.    -ctm  -Avoid nephrotoxic medications/contrast.

## 2017-08-12 NOTE — PROGRESS NOTES
Report called to receiving nursing staff in [facility]. Patient VSS. A&O x4. Remains on 2L NC, sating in low 90s. Pain controlled via prescribed PRN management. Denies N/V. Tolerating diet. Good UOP; +BM. Transferred off unit via wheelchair, and accompanied by transport staff.

## 2017-08-12 NOTE — DISCHARGE INSTRUCTIONS
Discharge Instructions    Discharged to home by car with relative. Discharged via wheelchair, hospital escort: Yes.  Special equipment needed: Not Applicable    Be sure to schedule a follow-up appointment with your primary care doctor or any specialists as instructed.     Discharge Plan:   Diet Plan: Discussed  Activity Level: Discussed  Confirmed Follow up Appointment: Patient to Call and Schedule Appointment  Confirmed Symptoms Management: Discussed  Influenza Vaccine Indication: Indicated: Not available from distributor/    I understand that a diet low in cholesterol, fat, and sodium is recommended for good health. Unless I have been given specific instructions below for another diet, I accept this instruction as my diet prescription.   Other diet: Regular    Special Instructions: None    · Is patient discharged on Warfarin / Coumadin?   No     · Is patient Post Blood Transfusion?  No    Pyelonephritis, Adult  Pyelonephritis is a kidney infection. A kidney infection can happen quickly, or it can last for a long time.  HOME CARE   · Take your medicine (antibiotics) as told. Finish it even if you start to feel better.  · Keep all doctor visits as told.  · Drink enough fluids to keep your pee (urine) clear or pale yellow.  · Only take medicine as told by your doctor.  GET HELP RIGHT AWAY IF:   · You have a fever or lasting symptoms for more than 2-3 days.  · You have a fever and your symptoms suddenly get worse.  · You cannot take your medicine or drink fluids as told.  · You have chills and shaking.  · You feel very weak or pass out (faint).  · You do not feel better after 2 days.  MAKE SURE YOU:  · Understand these instructions.  · Will watch your condition.  · Will get help right away if you are not doing well or get worse.     This information is not intended to replace advice given to you by your health care provider. Make sure you discuss any questions you have with your health care provider.      Document Released: 01/25/2006 Document Revised: 01/08/2016 Document Reviewed: 06/06/2012  [employer] Interactive Patient Education ©2016 [employer]      Depression / Suicide Risk    As you are discharged from this [facility] facility, it is important to learn how to keep safe from harming yourself.    Recognize the warning signs:  · Abrupt changes in personality, positive or negative- including increase in energy   · Giving away possessions  · Change in eating patterns- significant weight changes-  positive or negative  · Change in sleeping patterns- unable to sleep or sleeping all the time   · Unwillingness or inability to communicate  · Depression  · Unusual sadness, discouragement and loneliness  · Talk of wanting to die  · Neglect of personal appearance   · Rebelliousness- reckless behavior  · Withdrawal from people/activities they love  · Confusion- inability to concentrate     If you or a loved one observes any of these behaviors or has concerns about self-harm, here's what you can do:  · Talk about it- your feelings and reasons for harming yourself  · Remove any means that you might use to hurt yourself (examples: pills, rope, extension cords, firearm)  · Get professional help from the community (Mental Health, Substance Abuse, psychological counseling)  · Do not be alone:Call your Safe Contact- someone whom you trust who will be there for you.  · Call your local CRISIS HOTLINE 310-3452 or 585-006-0945  · Call your local Children's Mobile Crisis Response Team [place] (202) 179-1574 or www.DriverSide  · Call the toll free National Suicide Prevention Hotlines   · National Suicide Prevention Lifeline 511-027-EPNY (0916)  · National Hope Line Network 800-SUICIDE (761-9731)

## 2017-08-12 NOTE — ASSESSMENT & PLAN NOTE
- two separate 5 mm right side nodules seen incidentally on CT, unknown prior  - needs appropriate imaging follow up given history of smoking

## 2017-08-12 NOTE — PROGRESS NOTES
RN Night Shift Note:  A&O.  Ambulates independently.  Transferred from [facility] this evening at approx 2200.  /60, 142/72, and 112/53 this shift.  HR regular per auscultation and palp w/ rate 70s to 90s.  Lungs dim and distant.  No wheeze heard.  Requested RT treatment this am at 4am.  RR wnl.  Afebrile.  No c/o pain for me.  Sats 94% and better on 2L NC.  Mild/Mod RIVERO to BR.  600ml urine output this shift.        Patient states she will need a ride home.  Her  does not drive.  She is his caregiver.  Will communicate this to day shift RN.  Patient hoping to D/C today.

## 2017-08-13 ENCOUNTER — PATIENT OUTREACH (OUTPATIENT)
Dept: HEALTH INFORMATION MANAGEMENT | Facility: OTHER | Age: 67
End: 2017-08-13

## 2017-08-14 ENCOUNTER — PATIENT OUTREACH (OUTPATIENT)
Dept: HEALTH INFORMATION MANAGEMENT | Facility: OTHER | Age: 67
End: 2017-08-14

## 2017-08-15 ENCOUNTER — TELEPHONE (OUTPATIENT)
Dept: NEPHROLOGY | Facility: MEDICAL CENTER | Age: 67
End: 2017-08-15

## 2017-08-15 LAB
BACTERIA BLD CULT: NORMAL
BACTERIA BLD CULT: NORMAL
SIGNIFICANT IND 70042: NORMAL
SIGNIFICANT IND 70042: NORMAL
SITE SITE: NORMAL
SITE SITE: NORMAL
SOURCE SOURCE: NORMAL
SOURCE SOURCE: NORMAL

## 2017-08-15 NOTE — TELEPHONE ENCOUNTER
Pt called said you referral her to a wound care but the places you referral her not taking new patient at this moment,they gave her number to call other office she want to let you know about it and also I told her to call those number that they gave her to call and ask,but she wants to know what you want to do first   Thank you her number is [phone]

## 2017-08-21 ENCOUNTER — OFFICE VISIT (OUTPATIENT)
Dept: MEDICAL GROUP | Facility: CLINIC | Age: 67
End: 2017-08-21
Payer: MEDICARE

## 2017-08-21 VITALS
HEART RATE: 72 BPM | HEIGHT: 64 IN | TEMPERATURE: 97.7 F | DIASTOLIC BLOOD PRESSURE: 80 MMHG | SYSTOLIC BLOOD PRESSURE: 130 MMHG | RESPIRATION RATE: 16 BRPM | OXYGEN SATURATION: 93 % | BODY MASS INDEX: 31.24 KG/M2 | WEIGHT: 183 LBS

## 2017-08-21 DIAGNOSIS — Z87.448 HISTORY OF PYELONEPHRITIS: ICD-10-CM

## 2017-08-21 DIAGNOSIS — N94.89 ADNEXAL MASS: ICD-10-CM

## 2017-08-21 DIAGNOSIS — N18.4 CKD (CHRONIC KIDNEY DISEASE) STAGE 4, GFR 15-29 ML/MIN (HCC): ICD-10-CM

## 2017-08-21 DIAGNOSIS — R91.8 LUNG NODULE, MULTIPLE: ICD-10-CM

## 2017-08-21 DIAGNOSIS — Z23 NEED FOR ZOSTER VACCINATION: ICD-10-CM

## 2017-08-21 DIAGNOSIS — Z23 NEED FOR TDAP VACCINATION: ICD-10-CM

## 2017-08-21 PROBLEM — E87.1 HYPONATREMIA: Status: RESOLVED | Noted: 2017-08-11 | Resolved: 2017-08-21

## 2017-08-21 PROCEDURE — 90715 TDAP VACCINE 7 YRS/> IM: CPT | Performed by: FAMILY MEDICINE

## 2017-08-21 PROCEDURE — 90471 IMMUNIZATION ADMIN: CPT | Performed by: FAMILY MEDICINE

## 2017-08-21 PROCEDURE — 99214 OFFICE O/P EST MOD 30 MIN: CPT | Mod: 25 | Performed by: FAMILY MEDICINE

## 2017-08-21 ASSESSMENT — PAIN SCALES - GENERAL: PAINLEVEL: NO PAIN

## 2017-08-21 ASSESSMENT — ENCOUNTER SYMPTOMS
HEADACHES: 0
CARDIOVASCULAR NEGATIVE: 1
CHILLS: 0
MYALGIAS: 0
MUSCULOSKELETAL NEGATIVE: 1
NEUROLOGICAL NEGATIVE: 1
COUGH: 0
FEVER: 0
PALPITATIONS: 0
NECK PAIN: 0
CONSTIPATION: 0
CONSTITUTIONAL NEGATIVE: 1
EYES NEGATIVE: 1
RESPIRATORY NEGATIVE: 1
PSYCHIATRIC NEGATIVE: 1
HEMOPTYSIS: 0
GASTROINTESTINAL NEGATIVE: 1
DIZZINESS: 0

## 2017-08-21 ASSESSMENT — PATIENT HEALTH QUESTIONNAIRE - PHQ9: CLINICAL INTERPRETATION OF PHQ2 SCORE: 0

## 2017-08-21 NOTE — PROGRESS NOTES
Subjective:      Lauren Bowden is a 67 y.o. female who presents with Follow-Up and Immunizations            HPI Comments: 1. History of pyelonephritis  Admit this month for this doing better with no pain or dysuria now    2. Lung nodule, multiple  Will get f/u in 6 mo scan    3. Adnexal mass  Has appt in 2 mo with gyn for f/u for this    4. CKD (chronic kidney disease) stage 4, GFR 15-29 ml/min (CMS-HCC)  Patient is currently being followed for chronic renal insufficiency. They are avoiding nsaids and maintaining hydration and following renal diet. Taking all appropriate meds. No new change in urine frequency or volume.      5. Need for Tdap vaccination    - Tdap =>6yo IM    6. Need for zoster vaccination    - Zoster Vaccine Live (ZOSTAVAX) 62491 UNT/0.65ML Recon Susp; Inject 0.65 mL as instructed Once for 1 dose.  Dispense: 0.65 mL; Refill: 0    Past Medical History:    Hypertension                                                  Hypercholesteremia                                            COPD                                                          Stroke (CMS-HCC)                                              CKD (chronic kidney disease) stage 4, GFR 15-2* 7/1/2013        Comment:Did see Dr. Perkins; current GFR 27 6/26/13; Has                both kidneys, only one is functioning.    Vitamin d deficiency                            7/1/2013        Comment:Was taking 2000 - still 26; incr to 4000  Past Surgical History:    APPENDECTOMY                                                   Smoking Status: Former Smoker                   Packs/Day: 1.00  Years: 40         Types: Cigarettes      Quit date: 10/01/2010    Smokeless Status: Never Used                        Alcohol Use: Yes                Comment: rare     Review of patient's family history indicates:    Diabetes                       Sister                    Cancer                         Sister                      Current outpatient prescriptions: •   Zoster Vaccine Live (ZOSTAVAX) 54344 UNT/0.65ML Recon Susp, Inject 0.65 mL as instructed Once for 1 dose., Disp: 0.65 mL, Rfl: 0•  clopidogrel (PLAVIX) 75 MG Tab, Take 1 tablet by mouth  every day, Disp: 100 Tab, Rfl: 1•  albuterol-ipratropium (COMBIVENT)  MCG/ACT Aerosol, Inhale 2 Puffs by mouth 4 times a day., Disp: 1 Inhaler, Rfl: 6•  cyclobenzaprine (FLEXERIL) 10 MG Tab, Take 1 Tab by mouth 3 times a day as needed. FOR MILD PAIN., Disp: 90 Tab, Rfl: 0•  raloxifene (EVISTA) 60 MG Tab, Take 1 Tab by mouth every day., Disp: 90 Tab, Rfl: 0•  buPROPion SR (WELLBUTRIN-SR) 150 MG TABLET SR 12 HR sustained-release tablet, Take 1 Tab by mouth every day., Disp: 90 Tab, Rfl: 3•  omeprazole (PRILOSEC) 20 MG delayed-release capsule, Take 1 Cap by mouth every day., Disp: 90 Cap, Rfl: 3•  fluticasone-salmeterol (ADVAIR DISKUS) 250-50 MCG/DOSE AEROSOL POWDER, BREATH ACTIVATED, Inhale 1 Puff by mouth every 12 hours., Disp: 1 Inhaler, Rfl: 6•  felodipine (PLENDIL) 5 MG TABLET SR 24 HR, Take 10 mg by mouth every day., Disp: , Rfl: •  cefdinir (OMNICEF) 300 MG Cap, Take 1 Cap by mouth 2 times a day. (Patient not taking: Reported on 8/21/2017), Disp: 22 Cap, Rfl: 0          Review of Systems   Constitutional: Negative.  Negative for fever and chills.        Past Medical History:    Hypertension                                                  Hypercholesteremia                                            COPD                                                          Stroke (CMS-[facility])                                              CKD (chronic kidney disease) stage 4, GFR 15-2* 7/1/2013        Comment:Did see Dr. Perkins; current GFR 27 6/26/13; Has                both kidneys, only one is functioning.    Vitamin d deficiency                            7/1/2013        Comment:Was taking 2000 - still 26; incr to 4000  Past Surgical History:    APPENDECTOMY                                                   Smoking Status: Former Smoker   "                 Packs/Day: 1.00  Years: 40        Types: Cigarettes      Quit date: 10/01/2010    Smokeless Status: Never Used                        Alcohol Use: Yes                Comment: rare     Review of patient's family history indicates:    Diabetes                       Sister                    Cancer                         Sister                     HENT: Negative.    Eyes: Negative.    Respiratory: Negative.  Negative for cough and hemoptysis.    Cardiovascular: Negative.  Negative for chest pain and palpitations.   Gastrointestinal: Negative.  Negative for constipation.   Genitourinary: Positive for dysuria, urgency and hematuria.   Musculoskeletal: Negative.  Negative for myalgias and neck pain.   Skin: Negative.  Negative for rash.   Neurological: Negative.  Negative for dizziness and headaches.   Endo/Heme/Allergies: Negative.    Psychiatric/Behavioral: Negative.  Negative for suicidal ideas.          Objective:     /80 mmHg  Pulse 72  Temp(Src) 36.5 °C (97.7 °F)  Resp 16  Ht 1.638 m (5' 4.49\")  Wt 83.008 kg (183 lb)  BMI 30.94 kg/m2  SpO2 93%     Physical Exam   Constitutional: She is oriented to person, place, and time. No distress.   HENT:   Head: Normocephalic and atraumatic.   Right Ear: External ear normal.   Left Ear: External ear normal.   Nose: Nose normal.   Mouth/Throat: Oropharynx is clear and moist. No oropharyngeal exudate.   Eyes: Pupils are equal, round, and reactive to light. Right eye exhibits no discharge. Left eye exhibits no discharge. No scleral icterus.   Neck: Normal range of motion. Neck supple. No JVD present. No tracheal deviation present. No thyromegaly present.   Cardiovascular: Normal rate, regular rhythm, normal heart sounds and intact distal pulses.  Exam reveals no gallop and no friction rub.    No murmur heard.  Pulmonary/Chest: Effort normal and breath sounds normal. No stridor. No respiratory distress. She has no wheezes. She has no rales. She " exhibits no tenderness.   Abdominal: Soft. She exhibits no distension. There is no tenderness.   Lymphadenopathy:     She has no cervical adenopathy.   Neurological: She is alert and oriented to person, place, and time.   Skin: Skin is warm and dry. She is not diaphoretic.   Psychiatric: Judgment normal.   Nursing note and vitals reviewed.              Assessment/Plan:     1. History of pyelonephritis      2. Lung nodule, multiple      3. Adnexal mass      4. CKD (chronic kidney disease) stage 4, GFR 15-29 ml/min (CMS-[facility])      5. Need for Tdap vaccination    - Tdap =>6yo IM    6. Need for zoster vaccination    - Zoster Vaccine Live (ZOSTAVAX) 60590 UNT/0.65ML Recon Susp; Inject 0.65 mL as instructed Once for 1 dose.  Dispense: 0.65 mL; Refill: 0

## 2017-08-28 DIAGNOSIS — I25.10 CORONARY ARTERY DISEASE INVOLVING NATIVE CORONARY ARTERY OF NATIVE HEART WITHOUT ANGINA PECTORIS: ICD-10-CM

## 2017-08-28 RX ORDER — BUPROPION HYDROCHLORIDE 150 MG/1
TABLET, EXTENDED RELEASE ORAL
Qty: 90 TAB | Refills: 1 | Status: SHIPPED | OUTPATIENT
Start: 2017-08-28 | End: 2018-07-01 | Stop reason: SDUPTHER

## 2017-08-28 NOTE — TELEPHONE ENCOUNTER
Was the patient seen in the last year in this department? Yes     Does patient have an active prescription for medications requested? Yes     Received Request Via: Patient     Last Visit: 8/21/17  Last Labs: 8/12/17

## 2017-09-13 ENCOUNTER — PATIENT OUTREACH (OUTPATIENT)
Dept: HEALTH INFORMATION MANAGEMENT | Facility: OTHER | Age: 67
End: 2017-09-13

## 2017-09-13 NOTE — PROGRESS NOTES
Lauren Bowden was admitted to [facility] on 8/10/17 with fever, fatigue and hematuria and was discharged home on 8/12/17. [facility] patient advocate was able to assist with multiple discharge needs including establishing care Lauren with an OB/GYN. Per discharge orders, patient was instructed to see her PCP within one week, and to see an OB/GYN. Patient saw her PCP on 8/21/17 and has a new patient appointment with an OB/GYN for 11/1/17. Patient plans to keep upcoming appointments.

## 2017-09-22 ENCOUNTER — OFFICE VISIT (OUTPATIENT)
Dept: NEPHROLOGY | Facility: MEDICAL CENTER | Age: 67
End: 2017-09-22
Payer: MEDICARE

## 2017-09-22 VITALS
SYSTOLIC BLOOD PRESSURE: 140 MMHG | OXYGEN SATURATION: 92 % | DIASTOLIC BLOOD PRESSURE: 72 MMHG | HEART RATE: 84 BPM | WEIGHT: 186 LBS | BODY MASS INDEX: 31.76 KG/M2 | TEMPERATURE: 97.9 F | HEIGHT: 64 IN | RESPIRATION RATE: 16 BRPM

## 2017-09-22 DIAGNOSIS — I10 ESSENTIAL HYPERTENSION: ICD-10-CM

## 2017-09-22 DIAGNOSIS — N18.30 STAGE 3 CHRONIC KIDNEY DISEASE (HCC): ICD-10-CM

## 2017-09-22 PROBLEM — N10 PYELONEPHRITIS, ACUTE: Status: RESOLVED | Noted: 2017-08-10 | Resolved: 2017-09-22

## 2017-09-22 PROCEDURE — 99213 OFFICE O/P EST LOW 20 MIN: CPT | Performed by: INTERNAL MEDICINE

## 2017-09-22 ASSESSMENT — ENCOUNTER SYMPTOMS
PALPITATIONS: 0
FEVER: 0
CHILLS: 0
FLANK PAIN: 0

## 2017-09-22 NOTE — PROGRESS NOTES
"Subjective:      Lauren Bowden is a 67 y.o. female who presents with Hospital Follow-up            HPI    67 year old with CKD stage III, had seen nephrology, though last visit was in 2013, at that time had CKD stage III. Has been doing well from a renal standpoint over this period of time. Recently in the hospital for klebsiella pyelonephitis. She is now off abx, and doing well overall. Occasional cloudy urine noted. No burning on urination or pain on urination. Has been taking her medications regularly. BP has been controlled.      Review of Systems   Constitutional: Negative for chills and fever.   Cardiovascular: Negative for chest pain and palpitations.   Genitourinary: Negative for dysuria, flank pain, frequency, hematuria and urgency.          Objective:     /72   Pulse 84   Temp 36.6 °C (97.9 °F)   Resp 16   Ht 1.638 m (5' 4.49\")   Wt 84.4 kg (186 lb)   SpO2 92%   BMI 31.45 kg/m²      Physical Exam   Constitutional: She is oriented to person, place, and time. She appears well-developed and well-nourished.   HENT:   Head: Normocephalic and atraumatic.   Cardiovascular: Normal rate and regular rhythm.    Pulmonary/Chest: Effort normal and breath sounds normal.   Abdominal: Soft. Bowel sounds are normal.   Neurological: She is alert and oriented to person, place, and time.   Skin: Skin is warm and dry.   Psychiatric: She has a normal mood and affect. Her behavior is normal.               Assessment/Plan:     1. Essential hypertension  BP at goal, taking felodipine. No symptoms.    2. Stage III CKD  Cr 1.58, no symptoms, not on ACEi. No significant progression in years.      PLAN  1. Stable from a renal standpoint  2. Given recent diagnosis of pyelo, will check UA  3. Follow up CKD labs at 6 months    "

## 2017-11-01 ENCOUNTER — GYNECOLOGY VISIT (OUTPATIENT)
Dept: OBGYN | Facility: CLINIC | Age: 67
End: 2017-11-01
Payer: MEDICARE

## 2017-11-01 VITALS
HEIGHT: 64 IN | BODY MASS INDEX: 32.1 KG/M2 | DIASTOLIC BLOOD PRESSURE: 82 MMHG | SYSTOLIC BLOOD PRESSURE: 140 MMHG | WEIGHT: 188 LBS

## 2017-11-01 DIAGNOSIS — Z01.419 WELL WOMAN EXAM: ICD-10-CM

## 2017-11-01 DIAGNOSIS — D49.59 OVARIAN NEOPLASM: ICD-10-CM

## 2017-11-01 DIAGNOSIS — Z12.39 SCREENING FOR BREAST CANCER: ICD-10-CM

## 2017-11-01 PROCEDURE — 99204 OFFICE O/P NEW MOD 45 MIN: CPT | Performed by: OBSTETRICS & GYNECOLOGY

## 2017-11-01 NOTE — PROGRESS NOTES
67 y.o.    Female presents as new patient for evaluation of Pelvic mass     Subjective:Pain:  Pain:  No pain, Intensity: previous pain  In Right flank : , Location:   diarrhea :  No          Vaginal discharge: no discharge   Vaginal Bleeding: none  Dysmenorrhea:negative, Dyspareunia:negative  Problems with contraception: not tested      PAtient with multiple medical problems, had right Flank pain , and pyelo in 2017, during CT scan 6.8 cm cystic pelvic lesion seen . Patient here for eval.   Patient denies acute abdoinal pain, vaginal bleeding     LMP:  No LMP recorded. Patient is postmenopausal.  ROS:  Neurological:Denies, headaches  Breast:{Denies breast tenderness, mass, discharge, changes in size or contour, or abnormal cyclic discomfort., Negative breast symptoms: tenderness, pain, mass, cyst  GastroIntestinalNegative for abdominal discomfort, blood in stools or black stools  No change in bowel habits  No unintentional weight loss  Positive for large abdominal wall  Genito-urinary:{Negative for dysuria, frequency and incontinence  Menstrual: DENIES, bleeding after menopause, vaginal dryness  All other systems reviewed : and are Negative  PMH:  Past Medical History:   Diagnosis Date   • CKD (chronic kidney disease) stage 4, GFR 15-29 ml/min (CMS-[facility]) 2013    Did see Dr. Perkins; current GFR 27 13; Has both kidneys, only one is functioning.   • COPD    • Hypercholesteremia    • Hypertension    • Pulmonary emphysema (CMS-HCC)    • Stroke (CMS-HCC)    • Vitamin d deficiency 2013    Was taking 2000 - still 26; incr to 4000     Past Surgical History:   Procedure Laterality Date   • APPENDECTOMY        x 2 ,   Family History   Problem Relation Age of Onset   • Diabetes Sister    • Cancer Sister      Social History     Social History   • Marital status:      Spouse name: N/A   • Number of children: N/A   • Years of education: N/A     Occupational History   • Not on file.     Social History  "Main Topics   • Smoking status: Former Smoker     Packs/day: 1.00     Years: 40.00     Types: Cigarettes     Quit date: 10/1/2010   • Smokeless tobacco: Never Used   • Alcohol use Yes      Comment: rare    • Drug use: No   • Sexual activity: No      Comment:      Other Topics Concern   • Not on file     Social History Narrative   • No narrative on file       Current Outpatient Prescriptions on File Prior to Visit   Medication Sig Dispense Refill   • buPROPion SR (WELLBUTRIN-SR) 150 MG TABLET SR 12 HR sustained-release tablet Take 1 tablet by mouth  every day 90 Tab 1   • cefdinir (OMNICEF) 300 MG Cap Take 1 Cap by mouth 2 times a day. (Patient not taking: Reported on 8/21/2017) 22 Cap 0   • clopidogrel (PLAVIX) 75 MG Tab Take 1 tablet by mouth  every day 100 Tab 1   • albuterol-ipratropium (COMBIVENT)  MCG/ACT Aerosol Inhale 2 Puffs by mouth 4 times a day. 1 Inhaler 6   • cyclobenzaprine (FLEXERIL) 10 MG Tab Take 1 Tab by mouth 3 times a day as needed. FOR MILD PAIN. 90 Tab 0   • raloxifene (EVISTA) 60 MG Tab Take 1 Tab by mouth every day. 90 Tab 0   • omeprazole (PRILOSEC) 20 MG delayed-release capsule Take 1 Cap by mouth every day. 90 Cap 3   • fluticasone-salmeterol (ADVAIR DISKUS) 250-50 MCG/DOSE AEROSOL POWDER, BREATH ACTIVATED Inhale 1 Puff by mouth every 12 hours. 1 Inhaler 6   • felodipine (PLENDIL) 5 MG TABLET SR 24 HR Take 10 mg by mouth every day.       No current facility-administered medications on file prior to visit.        Summary of Allergies:  Review of patient's allergies indicates no known allergies.  /82   Ht 1.626 m (5' 4\")   Wt 85.3 kg (188 lb)   Breastfeeding? No   BMI 32.27 kg/m²   Exam:  Skin: Cool and dry with no lesions or rashes.  Lymph: no inguinal nodes  Neuro: Awake, alert and oriented x 3, Cranial nerves II-XII grossly intact  ENT:Normal, Unremarkable  Eyes: corneas clear  BREAST: Inspection negative. No nipple discharge or bleeding. No masses or nodularity " palpable, negative findings: normal in size and symmetry, normal contour with no evidence of flattening or dimpling, skin normal, nipples everted without rashes or discharge, palpation negative for masses or nodules  Cor:  RRR No M  LUNGS:Clear to auscultation, No crackles.  Abdominal :   positive findings: distended, no tympani on percusion or fluid wave , non tender all quadrants , no Rebound , rigidity .  Genito-Urinary:Perineum and external genitalia normal, Vagina normal w/o redness or discharge, Cervix w/o lesions or discharge, atrophic changes , uterus small mobile , body habitus , prevents full adnxeal evaluation : negatives : no cul de sac mass effect, left sidewall clear , without fullness adnexal mass not discretely appreciated   Extremities:mild cyanosis of feet present  Psych: normal affect  LAB:  Lab:No results found for this or any previous visit (from the past 336 hour(s)).      Ultrasounds:    8/10/2017 1:33 PM    HISTORY/REASON FOR EXAM:  Left adnexal mass on CT      TECHNIQUE/EXAM DESCRIPTION:  Transvaginal pelvic ultrasound.    COMPARISON:   CT 8/10/2017    FINDINGS:  Both transabdominal and transvaginal scanning was performed to optimally visualize the pelvis.    The uterus measures 2.57 cm x 4.88 cm x 3.68 cm.  The uterine myometrium is inhomogeneous.  The endometrial echo complex measures 0.26 cm. Cystic changes are seen in the lower uterine segment. Cystic changes at the level of the cervix likely represent nabothian cysts.    There is a 6.8 x 4.8 x 5.5 cm left adnexal cystic lesion containing a fine septation. No solid nodules are identified. Doppler interrogation demonstrates blood flow to the wall of the cyst.    There is no free fluid seen.   Impression       6.8 cm cystic left adnexal lesion may represent an ovarian cystadenoma or cystadenocarcinoma. Gynecological evaluation recommended.    Right ovary not visualized.    Inhomogeneous appearance of the uterus.   Reading Provider Reading  Date   Nikki Millan M.D. Aug 10, 2017   Signing Provider Signing Date Signing Time   Nikki Millan M.D. Aug 10, 2017  3:19 PM   Lab Information     Lab   RADIOLOGY                 Ass:menopausal female             Large left adnexal : ovarian cystic lesion , U/S and clinical information and exam c/w neoplastic , not malignant   Such as cystadenoma ,not clinically symptomatic , but at risk for torsion   Discuss need for surgery . Work up prior to include assessment for Tumor markers   Also will need surgical clearance by PCP     P. Ca-125, Ca15-3, LDH       Need PCP medical clearance   Auth for surgery : XLap, WEST/BSO   Mammogram

## 2017-11-07 ENCOUNTER — HOSPITAL ENCOUNTER (OUTPATIENT)
Dept: LAB | Facility: MEDICAL CENTER | Age: 67
End: 2017-11-07
Attending: INTERNAL MEDICINE
Payer: MEDICARE

## 2017-11-07 ENCOUNTER — HOSPITAL ENCOUNTER (OUTPATIENT)
Dept: LAB | Facility: MEDICAL CENTER | Age: 67
End: 2017-11-07
Attending: OBSTETRICS & GYNECOLOGY
Payer: MEDICARE

## 2017-11-07 DIAGNOSIS — Z12.39 SCREENING FOR BREAST CANCER: ICD-10-CM

## 2017-11-07 DIAGNOSIS — I10 ESSENTIAL HYPERTENSION: ICD-10-CM

## 2017-11-07 DIAGNOSIS — N18.30 STAGE 3 CHRONIC KIDNEY DISEASE (HCC): ICD-10-CM

## 2017-11-07 DIAGNOSIS — Z01.419 WELL WOMAN EXAM: ICD-10-CM

## 2017-11-07 LAB
ANION GAP SERPL CALC-SCNC: 9 MMOL/L (ref 0–11.9)
APPEARANCE UR: ABNORMAL
BACTERIA #/AREA URNS HPF: ABNORMAL /HPF
BILIRUB UR QL STRIP.AUTO: NEGATIVE
BUN SERPL-MCNC: 16 MG/DL (ref 8–22)
CALCIUM SERPL-MCNC: 9.3 MG/DL (ref 8.5–10.5)
CANCER AG125 SERPL-ACNC: 8.4 U/ML (ref 0–35)
CHLORIDE SERPL-SCNC: 100 MMOL/L (ref 96–112)
CO2 SERPL-SCNC: 25 MMOL/L (ref 20–33)
COLOR UR: YELLOW
CREAT SERPL-MCNC: 1.5 MG/DL (ref 0.5–1.4)
CREAT UR-MCNC: 61.2 MG/DL
EPI CELLS #/AREA URNS HPF: NEGATIVE /HPF
GFR SERPL CREATININE-BSD FRML MDRD: 34 ML/MIN/1.73 M 2
GLUCOSE SERPL-MCNC: 95 MG/DL (ref 65–99)
GLUCOSE UR STRIP.AUTO-MCNC: NEGATIVE MG/DL
HYALINE CASTS #/AREA URNS LPF: ABNORMAL /LPF
KETONES UR STRIP.AUTO-MCNC: NEGATIVE MG/DL
LDH SERPL-CCNC: 145 U/L (ref 107–266)
LEUKOCYTE ESTERASE UR QL STRIP.AUTO: ABNORMAL
MICRO URNS: ABNORMAL
MICROALBUMIN UR-MCNC: 25.1 MG/DL
MICROALBUMIN/CREAT UR: 410 MG/G (ref 0–30)
NITRITE UR QL STRIP.AUTO: NEGATIVE
PH UR STRIP.AUTO: 6.5 [PH]
POTASSIUM SERPL-SCNC: 4.4 MMOL/L (ref 3.6–5.5)
PROT UR QL STRIP: 100 MG/DL
RBC # URNS HPF: ABNORMAL /HPF
RBC UR QL AUTO: ABNORMAL
SODIUM SERPL-SCNC: 134 MMOL/L (ref 135–145)
SP GR UR STRIP.AUTO: 1.01
UROBILINOGEN UR STRIP.AUTO-MCNC: 0.2 MG/DL
WBC #/AREA URNS HPF: ABNORMAL /HPF

## 2017-11-07 PROCEDURE — 82570 ASSAY OF URINE CREATININE: CPT

## 2017-11-07 PROCEDURE — 86304 IMMUNOASSAY TUMOR CA 125: CPT | Mod: GA

## 2017-11-07 PROCEDURE — 82043 UR ALBUMIN QUANTITATIVE: CPT

## 2017-11-07 PROCEDURE — 80048 BASIC METABOLIC PNL TOTAL CA: CPT

## 2017-11-07 PROCEDURE — 83615 LACTATE (LD) (LDH) ENZYME: CPT

## 2017-11-07 PROCEDURE — 81001 URINALYSIS AUTO W/SCOPE: CPT

## 2017-11-07 PROCEDURE — 86300 IMMUNOASSAY TUMOR CA 15-3: CPT | Mod: GA

## 2017-11-07 PROCEDURE — 36415 COLL VENOUS BLD VENIPUNCTURE: CPT

## 2017-11-09 LAB — CANCER AG15-3 SERPL-ACNC: 14 U/ML (ref 0–31)

## 2017-11-27 ENCOUNTER — OFFICE VISIT (OUTPATIENT)
Dept: MEDICAL GROUP | Facility: CLINIC | Age: 67
End: 2017-11-27
Payer: MEDICARE

## 2017-11-27 ENCOUNTER — HOSPITAL ENCOUNTER (OUTPATIENT)
Dept: RADIOLOGY | Facility: MEDICAL CENTER | Age: 67
End: 2017-11-27
Attending: OBSTETRICS & GYNECOLOGY
Payer: MEDICARE

## 2017-11-27 VITALS
HEART RATE: 88 BPM | WEIGHT: 190 LBS | SYSTOLIC BLOOD PRESSURE: 138 MMHG | BODY MASS INDEX: 32.44 KG/M2 | DIASTOLIC BLOOD PRESSURE: 80 MMHG | TEMPERATURE: 98.2 F | RESPIRATION RATE: 18 BRPM | HEIGHT: 64 IN | OXYGEN SATURATION: 90 %

## 2017-11-27 DIAGNOSIS — D49.59 OVARIAN NEOPLASM: ICD-10-CM

## 2017-11-27 DIAGNOSIS — N18.30 STAGE 3 CHRONIC KIDNEY DISEASE (HCC): ICD-10-CM

## 2017-11-27 DIAGNOSIS — Z23 NEED FOR INFLUENZA VACCINATION: ICD-10-CM

## 2017-11-27 DIAGNOSIS — Z01.810 PREOP CARDIOVASCULAR EXAM: ICD-10-CM

## 2017-11-27 DIAGNOSIS — Z12.39 SCREENING FOR BREAST CANCER: ICD-10-CM

## 2017-11-27 DIAGNOSIS — J44.9 CHRONIC OBSTRUCTIVE PULMONARY DISEASE, UNSPECIFIED COPD TYPE (HCC): ICD-10-CM

## 2017-11-27 DIAGNOSIS — Z01.419 WELL WOMAN EXAM: ICD-10-CM

## 2017-11-27 PROCEDURE — G0008 ADMIN INFLUENZA VIRUS VAC: HCPCS | Performed by: FAMILY MEDICINE

## 2017-11-27 PROCEDURE — 99214 OFFICE O/P EST MOD 30 MIN: CPT | Mod: 25 | Performed by: FAMILY MEDICINE

## 2017-11-27 PROCEDURE — G0202 SCR MAMMO BI INCL CAD: HCPCS

## 2017-11-27 PROCEDURE — 90662 IIV NO PRSV INCREASED AG IM: CPT | Performed by: FAMILY MEDICINE

## 2017-11-27 ASSESSMENT — ENCOUNTER SYMPTOMS
RESPIRATORY NEGATIVE: 1
CONSTIPATION: 0
MYALGIAS: 0
NEUROLOGICAL NEGATIVE: 1
EYES NEGATIVE: 1
HEMOPTYSIS: 0
CARDIOVASCULAR NEGATIVE: 1
CHILLS: 0
FEVER: 0
CONSTITUTIONAL NEGATIVE: 1
HEADACHES: 0
PALPITATIONS: 0
NECK PAIN: 0
PSYCHIATRIC NEGATIVE: 1
GASTROINTESTINAL NEGATIVE: 1
MUSCULOSKELETAL NEGATIVE: 1
COUGH: 0
DIZZINESS: 0

## 2017-11-28 NOTE — PROGRESS NOTES
Subjective:      Lauren Bowden is a 67 y.o. female who presents with Pre-op Exam (needs clearance for surgery - EKG/labs already done) and Flu Vaccine            1. Need for influenza vaccination    - INFLUENZA VACCINE, HIGH DOSE (65+ ONLY)    2. Ovarian neoplasm  Had unknown mass and scheduled for resection    3. Chronic obstructive pulmonary disease, unspecified COPD type (CMS-[facility])  Patient has a diagnosis of copd and is using their medications and trying to avoid triggers such as colds/smoke/allergens.controlled      4. Stage 3 chronic kidney disease  Stable on recent labs  Will need close observation during hospital stay      5. Preop cardiovascular exam  No cp  Last surgery was appy and no issues with anesthesia  Has copd and not smoking  With her lower o2 levels will have to have close observation during surgery and afterwards but with no recent cp and ekg unremarkable in august should be cleared for surgery  - DX-CHEST-2 VIEWS; Future    Past Medical History:  7/1/2013: CKD (chronic kidney disease) stage 4, GFR 15-2*      Comment: Did see Dr. Perkins; current GFR 27 6/26/13; Has               both kidneys, only one is functioning.  No date: COPD  No date: Hypercholesteremia  No date: Hypertension  8/10/2017: Ovarian neoplasm      Comment: Incidental finding of the CT Renal  - 6.8 x                6.8 x 6 cm cystic left adnexal lesion may                represent an ovarian cystadenoma or                cystadenocarcinoma.    No date: Pulmonary emphysema (CMS-HCC)  No date: Stroke (CMS-HCC)  7/1/2013: Vitamin d deficiency      Comment: Was taking 2000 - still 26; incr to 4000  Past Surgical History:  No date: APPENDECTOMY  Smoking status: Former Smoker                                                              Packs/day: 1.00      Years: 40.00        Types: Cigarettes     Quit date: 10/1/2010  Smokeless tobacco: Never Used                      Alcohol use: Yes              Comment: rare     Review of patient's  family history indicates:    Diabetes                       Sister                    Cancer                         Sister                      Current Outpatient Prescriptions: •  buPROPion SR (WELLBUTRIN-SR) 150 MG TABLET SR 12 HR sustained-release tablet, Take 1 tablet by mouth  every day, Disp: 90 Tab, Rfl: 1•  clopidogrel (PLAVIX) 75 MG Tab, Take 1 tablet by mouth  every day, Disp: 100 Tab, Rfl: 1•  albuterol-ipratropium (COMBIVENT)  MCG/ACT Aerosol, Inhale 2 Puffs by mouth 4 times a day., Disp: 1 Inhaler, Rfl: 6•  cyclobenzaprine (FLEXERIL) 10 MG Tab, Take 1 Tab by mouth 3 times a day as needed. FOR MILD PAIN., Disp: 90 Tab, Rfl: 0•  raloxifene (EVISTA) 60 MG Tab, Take 1 Tab by mouth every day., Disp: 90 Tab, Rfl: 0•  omeprazole (PRILOSEC) 20 MG delayed-release capsule, Take 1 Cap by mouth every day., Disp: 90 Cap, Rfl: 3•  fluticasone-salmeterol (ADVAIR DISKUS) 250-50 MCG/DOSE AEROSOL POWDER, BREATH ACTIVATED, Inhale 1 Puff by mouth every 12 hours., Disp: 1 Inhaler, Rfl: 6•  felodipine (PLENDIL) 5 MG TABLET SR 24 HR, Take 10 mg by mouth every day., Disp: , Rfl: •  cefdinir (OMNICEF) 300 MG Cap, Take 1 Cap by mouth 2 times a day. (Patient not taking: Reported on 8/21/2017), Disp: 22 Cap, Rfl: 0    Patient was instructed on the use of medications, either prescriptions or OTC and informed on when the appropriate follow up time period should be. In addition, patient was also instructed that should any acute worsening occur that they should notify this clinic asap or call 911.            Review of Systems   Constitutional: Negative.  Negative for chills and fever.        Past Medical History:  7/1/2013: CKD (chronic kidney disease) stage 4, GFR 15-2*      Comment: Did see Dr. Perkins; current GFR 27 6/26/13; Has               both kidneys, only one is functioning.  No date: COPD  No date: Hypercholesteremia  No date: Hypertension  8/10/2017: Ovarian neoplasm      Comment: Incidental finding of the CT Renal  -  "6.8 x                6.8 x 6 cm cystic left adnexal lesion may                represent an ovarian cystadenoma or                cystadenocarcinoma.    No date: Pulmonary emphysema (CMS-[facility])  No date: Stroke (CMS-[facility])  7/1/2013: Vitamin d deficiency      Comment: Was taking 2000 - still 26; incr to 4000  Past Surgical History:  No date: APPENDECTOMY  Smoking status: Former Smoker                                                              Packs/day: 1.00      Years: 40.00        Types: Cigarettes     Quit date: 10/1/2010  Smokeless tobacco: Never Used                      Alcohol use: Yes              Comment: rare     Review of patient's family history indicates:    Diabetes                       Sister                    Cancer                         Sister                     HENT: Negative.    Eyes: Negative.    Respiratory: Negative.  Negative for cough and hemoptysis.    Cardiovascular: Negative.  Negative for chest pain and palpitations.   Gastrointestinal: Negative.  Negative for constipation.   Genitourinary: Negative.  Negative for dysuria and urgency.   Musculoskeletal: Negative.  Negative for myalgias and neck pain.   Skin: Negative.  Negative for rash.   Neurological: Negative.  Negative for dizziness and headaches.   Endo/Heme/Allergies: Negative.    Psychiatric/Behavioral: Negative.  Negative for suicidal ideas.          Objective:     /80   Pulse 88   Temp 36.8 °C (98.2 °F)   Resp 18   Ht 1.626 m (5' 4\")   Wt 86.2 kg (190 lb)   SpO2 90%   BMI 32.61 kg/m²      Physical Exam   Constitutional: She is oriented to person, place, and time. She appears well-developed and well-nourished. No distress.   HENT:   Head: Normocephalic and atraumatic.   Mouth/Throat: Oropharynx is clear and moist. No oropharyngeal exudate.   Eyes: Pupils are equal, round, and reactive to light.   Cardiovascular: Normal rate, regular rhythm, normal heart sounds and intact distal pulses.  Exam reveals no gallop and no " friction rub.    No murmur heard.  Pulmonary/Chest: Effort normal and breath sounds normal. No respiratory distress. She has no wheezes. She has no rales. She exhibits no tenderness.   Neurological: She is alert and oriented to person, place, and time.   Skin: She is not diaphoretic.   Psychiatric: She has a normal mood and affect. Her behavior is normal. Judgment and thought content normal.   Nursing note and vitals reviewed.              Assessment/Plan:     1. Need for influenza vaccination    - INFLUENZA VACCINE, HIGH DOSE (65+ ONLY)    2. Ovarian neoplasm      3. Chronic obstructive pulmonary disease, unspecified COPD type (CMS-HCC)      4. Stage 3 chronic kidney disease      5. Preop cardiovascular exam  - DX-CHEST-2 VIEWS; Future

## 2017-11-29 ENCOUNTER — NON-PROVIDER VISIT (OUTPATIENT)
Dept: URGENT CARE | Facility: PHYSICIAN GROUP | Age: 67
End: 2017-11-29
Payer: MEDICARE

## 2017-11-29 ENCOUNTER — APPOINTMENT (OUTPATIENT)
Dept: RADIOLOGY | Facility: IMAGING CENTER | Age: 67
End: 2017-11-29
Attending: FAMILY MEDICINE
Payer: MEDICARE

## 2017-11-29 DIAGNOSIS — Z01.810 PREOP CARDIOVASCULAR EXAM: ICD-10-CM

## 2017-11-29 DIAGNOSIS — Z01.810 PRE-OPERATIVE CARDIOVASCULAR EXAMINATION: ICD-10-CM

## 2017-11-29 PROCEDURE — 71020 DX-CHEST-2 VIEWS: CPT | Mod: 26 | Performed by: PHYSICIAN ASSISTANT

## 2017-12-11 NOTE — LETTER
December 11, 2017        Lauren Bowden  [id number]  [place] [address]          Dear Ms. Bowden,    Your mammogram did not show any significant findings, according to the radiologist’s interpretation.    Please remember that some cancers (about 10-15%) cannot be found by mammography alone, and that early detection requires a combination of monthly self-examination, yearly physical examination, and periodic mammography.    The American Cancer Society Guidelines recommend screening mammograms and physical breast examinations every year beginning at the age of 40.    Please continue regular breast self-examinations and report any changes that concern you, even before your next appointment.  If you have any further questions, please contact your doctor.        Sincerely,           Sara Alcocer  Electronically Signed

## 2018-01-04 ENCOUNTER — TELEPHONE (OUTPATIENT)
Dept: MEDICAL GROUP | Facility: CLINIC | Age: 68
End: 2018-01-04

## 2018-01-04 NOTE — TELEPHONE ENCOUNTER
1. Caller Name: Shaka                                         Call Back Number: [phone] (home)         Patient approves a detailed voicemail message: yes    Pt has has xray done for a surgical clearance. Pt was wondering if she has to come in to get the letter or if you can just do it without coming in , Pleaser advise

## 2018-01-09 ENCOUNTER — TELEPHONE (OUTPATIENT)
Dept: MEDICAL GROUP | Facility: PHYSICIAN GROUP | Age: 68
End: 2018-01-09

## 2018-01-09 ENCOUNTER — TELEPHONE (OUTPATIENT)
Dept: NEPHROLOGY | Facility: MEDICAL CENTER | Age: 68
End: 2018-01-09

## 2018-01-09 DIAGNOSIS — J43.8 OTHER EMPHYSEMA (HCC): ICD-10-CM

## 2018-01-09 DIAGNOSIS — K21.9 GERD WITHOUT ESOPHAGITIS: ICD-10-CM

## 2018-01-09 RX ORDER — OMEPRAZOLE 20 MG/1
20 CAPSULE, DELAYED RELEASE ORAL
Qty: 90 CAP | Refills: 3 | Status: SHIPPED | OUTPATIENT
Start: 2018-01-09 | End: 2018-01-18 | Stop reason: SDUPTHER

## 2018-01-09 NOTE — TELEPHONE ENCOUNTER
[facility] wanted clarification on an Rx for the pt. The pharmacist wanted to know if we wanted to fill Spiriva Res Mat? He said that normally the meds are only one dose a day and not 2 puffs 4 times a day. Please Advise. Thank you.

## 2018-01-10 DIAGNOSIS — I10 ESSENTIAL HYPERTENSION: ICD-10-CM

## 2018-01-10 RX ORDER — FUROSEMIDE 20 MG/1
20 TABLET ORAL 2 TIMES DAILY
Qty: 60 TAB | Refills: 11 | Status: SHIPPED | OUTPATIENT
Start: 2018-01-10 | End: 2018-01-18 | Stop reason: SDUPTHER

## 2018-01-18 ENCOUNTER — TELEPHONE (OUTPATIENT)
Dept: MEDICAL GROUP | Facility: CLINIC | Age: 68
End: 2018-01-18

## 2018-01-18 DIAGNOSIS — K21.9 GERD WITHOUT ESOPHAGITIS: ICD-10-CM

## 2018-01-18 DIAGNOSIS — J43.8 OTHER EMPHYSEMA (HCC): ICD-10-CM

## 2018-01-18 DIAGNOSIS — I10 ESSENTIAL HYPERTENSION: ICD-10-CM

## 2018-01-18 NOTE — TELEPHONE ENCOUNTER
Was the patient seen in the last year in this department? Yes     Does patient have an active prescription for medications requested? No     Received Request Via: Patient  - went to wrong pharmacy

## 2018-01-18 NOTE — TELEPHONE ENCOUNTER
1. Caller Name: faiza                                          Call Back Number: [phone] (home)         Patient approves a detailed voicemail message: N\A    Pt states she did her chest xray at the end of November that you ordered for her so frantz she could have surgery. She states she hasnt heard back and needs a clearance sent to Dr. Centeno. I did let her know that she may need another appt with you for this clearance. Please advise.

## 2018-01-19 RX ORDER — FUROSEMIDE 20 MG/1
20 TABLET ORAL 2 TIMES DAILY
Qty: 60 TAB | Refills: 11 | Status: SHIPPED | OUTPATIENT
Start: 2018-01-19 | End: 2018-10-16 | Stop reason: SDUPTHER

## 2018-01-19 RX ORDER — OMEPRAZOLE 20 MG/1
20 CAPSULE, DELAYED RELEASE ORAL
Qty: 90 CAP | Refills: 3 | Status: SHIPPED | OUTPATIENT
Start: 2018-01-19 | End: 2018-10-16 | Stop reason: SDUPTHER

## 2018-03-12 RX ORDER — CLOPIDOGREL BISULFATE 75 MG/1
TABLET ORAL
Qty: 100 TAB | Refills: 0 | Status: SHIPPED | OUTPATIENT
Start: 2018-03-12 | End: 2018-08-30 | Stop reason: SDUPTHER

## 2018-03-12 NOTE — TELEPHONE ENCOUNTER
Was the patient seen in the last year in this department? Yes     Does patient have an active prescription for medications requested? Yes     Received Request Via: Pharmacy     Last visit  11/27/2017  Last labs 11/7/2017

## 2018-04-09 DIAGNOSIS — R06.02 SOB (SHORTNESS OF BREATH): ICD-10-CM

## 2018-04-09 RX ORDER — ALBUTEROL SULFATE 2.5 MG/3ML
2.5 SOLUTION RESPIRATORY (INHALATION) EVERY 4 HOURS PRN
Qty: 75 ML | Refills: 0 | Status: SHIPPED | OUTPATIENT
Start: 2018-04-09 | End: 2018-09-11 | Stop reason: SDUPTHER

## 2018-05-04 DIAGNOSIS — M81.0 OSTEOPOROSIS: ICD-10-CM

## 2018-05-07 RX ORDER — RALOXIFENE HYDROCHLORIDE 60 MG/1
TABLET, FILM COATED ORAL
Qty: 90 TAB | Refills: 0 | Status: SHIPPED | OUTPATIENT
Start: 2018-05-07 | End: 2018-07-04 | Stop reason: SDUPTHER

## 2018-05-11 ENCOUNTER — OFFICE VISIT (OUTPATIENT)
Dept: NEPHROLOGY | Facility: MEDICAL CENTER | Age: 68
End: 2018-05-11
Payer: MEDICARE

## 2018-05-11 VITALS
WEIGHT: 195 LBS | HEIGHT: 64 IN | SYSTOLIC BLOOD PRESSURE: 124 MMHG | TEMPERATURE: 97.5 F | OXYGEN SATURATION: 91 % | DIASTOLIC BLOOD PRESSURE: 72 MMHG | RESPIRATION RATE: 16 BRPM | BODY MASS INDEX: 33.29 KG/M2 | HEART RATE: 88 BPM

## 2018-05-11 DIAGNOSIS — I10 ESSENTIAL HYPERTENSION: ICD-10-CM

## 2018-05-11 DIAGNOSIS — N18.30 STAGE 3 CHRONIC KIDNEY DISEASE (HCC): ICD-10-CM

## 2018-05-11 DIAGNOSIS — N13.5 URETERAL OBSTRUCTION, RIGHT: ICD-10-CM

## 2018-05-11 PROCEDURE — 99213 OFFICE O/P EST LOW 20 MIN: CPT | Performed by: INTERNAL MEDICINE

## 2018-05-11 ASSESSMENT — ENCOUNTER SYMPTOMS
PALPITATIONS: 0
CHILLS: 0
FEVER: 0

## 2018-05-11 NOTE — ASSESSMENT & PLAN NOTE
Will need repeat renal US. Last in 2013 shows bilateral atrophy. And no significant difference in size.

## 2018-05-11 NOTE — PROGRESS NOTES
"Subjective:      Lauren Bowden is a 68 y.o. female who presents with Follow-Up            HPI  67 year old with CKD III, history of renal obstruction s/p stent which is now removed, and US with bilateral atrophy. Has been doing well currently. Has HTN which has been controlled. No blood in the urine. Occasional use of ibuprofen for back pain, usually only about 3 x per month. No hematuria.     Review of Systems   Constitutional: Negative for chills and fever.   Cardiovascular: Negative for chest pain and palpitations.   Genitourinary: Negative for dysuria and urgency.   All other systems reviewed and are negative.         Objective:     /72   Pulse 88   Temp 36.4 °C (97.5 °F)   Resp 16   Ht 1.626 m (5' 4\")   Wt 88.5 kg (195 lb)   SpO2 91%   BMI 33.47 kg/m²      Physical Exam   Constitutional: She is oriented to person, place, and time. She appears well-developed and well-nourished.   HENT:   Head: Normocephalic and atraumatic.   Cardiovascular: Normal rate and regular rhythm.    Pulmonary/Chest: Effort normal and breath sounds normal.   Abdominal: Soft. Bowel sounds are normal.   Neurological: She is alert and oriented to person, place, and time.   Skin: Skin is warm and dry. No erythema.   Psychiatric: She has a normal mood and affect. Her behavior is normal.               Assessment/Plan:     1. Ureteral obstruction, right  Will check repeat US, last in 2013.    - US-RENAL; Future    2. Essential hypertension  BP controlled. No problems with medications. Continue.    3. Stage 3 chronic kidney disease  Check CKD labs. Follow up every 6 months.    - PTH INTACT (PTH ONLY); Future  - BASIC METABOLIC PANEL; Future  - VITAMIN D,25 HYDROXY; Future  - CBC WITHOUT DIFFERENTIAL; Future  - MICROALBUMIN CREAT RATIO URINE; Future  - US-RENAL; Future      -Also have discussed advanced directives with the patient and recommended she fill one out. AD given as well as class schedule for planning workshops.  "

## 2018-06-26 DIAGNOSIS — Z72.0 TOBACCO USE: ICD-10-CM

## 2018-06-26 RX ORDER — IPRATROPIUM BROMIDE AND ALBUTEROL 20; 100 UG/1; UG/1
SPRAY, METERED RESPIRATORY (INHALATION)
Qty: 24 G | Refills: 0 | Status: SHIPPED | OUTPATIENT
Start: 2018-06-26 | End: 2018-10-16 | Stop reason: SDUPTHER

## 2018-07-01 DIAGNOSIS — I25.10 CORONARY ARTERY DISEASE INVOLVING NATIVE CORONARY ARTERY OF NATIVE HEART WITHOUT ANGINA PECTORIS: ICD-10-CM

## 2018-07-02 RX ORDER — BUPROPION HYDROCHLORIDE 150 MG/1
TABLET, EXTENDED RELEASE ORAL
Qty: 90 TAB | Refills: 0 | Status: SHIPPED | OUTPATIENT
Start: 2018-07-02 | End: 2018-10-16 | Stop reason: SDUPTHER

## 2018-07-04 DIAGNOSIS — M81.0 OSTEOPOROSIS: ICD-10-CM

## 2018-07-05 RX ORDER — RALOXIFENE HYDROCHLORIDE 60 MG/1
TABLET, FILM COATED ORAL
Qty: 90 TAB | Refills: 0 | Status: SHIPPED | OUTPATIENT
Start: 2018-07-05 | End: 2018-10-16 | Stop reason: SDUPTHER

## 2018-07-11 ENCOUNTER — OFFICE VISIT (OUTPATIENT)
Dept: MEDICAL GROUP | Facility: PHYSICIAN GROUP | Age: 68
End: 2018-07-11
Payer: MEDICARE

## 2018-07-11 VITALS
HEART RATE: 89 BPM | SYSTOLIC BLOOD PRESSURE: 124 MMHG | DIASTOLIC BLOOD PRESSURE: 68 MMHG | BODY MASS INDEX: 31.58 KG/M2 | TEMPERATURE: 98.2 F | WEIGHT: 185 LBS | HEIGHT: 64 IN | OXYGEN SATURATION: 93 % | RESPIRATION RATE: 16 BRPM

## 2018-07-11 DIAGNOSIS — I10 ESSENTIAL HYPERTENSION: ICD-10-CM

## 2018-07-11 DIAGNOSIS — E66.9 OBESITY (BMI 30-39.9): ICD-10-CM

## 2018-07-11 DIAGNOSIS — G45.9 TRANSIENT CEREBRAL ISCHEMIA, UNSPECIFIED TYPE: ICD-10-CM

## 2018-07-11 DIAGNOSIS — I25.10 CORONARY ARTERY DISEASE INVOLVING NATIVE HEART WITHOUT ANGINA PECTORIS, UNSPECIFIED VESSEL OR LESION TYPE: ICD-10-CM

## 2018-07-11 DIAGNOSIS — N18.30 STAGE 3 CHRONIC KIDNEY DISEASE (HCC): ICD-10-CM

## 2018-07-11 DIAGNOSIS — Z12.11 COLON CANCER SCREENING: ICD-10-CM

## 2018-07-11 DIAGNOSIS — J43.8 OTHER EMPHYSEMA (HCC): ICD-10-CM

## 2018-07-11 DIAGNOSIS — E78.5 DYSLIPIDEMIA: ICD-10-CM

## 2018-07-11 PROCEDURE — 99214 OFFICE O/P EST MOD 30 MIN: CPT | Performed by: FAMILY MEDICINE

## 2018-07-11 ASSESSMENT — PATIENT HEALTH QUESTIONNAIRE - PHQ9: CLINICAL INTERPRETATION OF PHQ2 SCORE: 0

## 2018-07-11 NOTE — ASSESSMENT & PLAN NOTE
She is followed by her nephrologist Dr. Sandoval and was told she has only one functioning kidney  Her renal function is stable. GFR in 30s.

## 2018-07-11 NOTE — ASSESSMENT & PLAN NOTE
Chronic condition, Controlled on her inhalers, she has no oxygen requirement, no chronic cough and no wheezing.

## 2018-07-12 NOTE — ASSESSMENT & PLAN NOTE
Patient takes no cholesterol medication at present though was on lipitor and fish oil in the past.   Will check labs   She is on plavix but states this is for a TIA she had about 3 years ago. Has no stenting in her heart.

## 2018-07-12 NOTE — ASSESSMENT & PLAN NOTE
Chronic condition well controlled on felodipine 5 mg daily   She also takes lasix for control of LE edema 20 mg daily.

## 2018-07-12 NOTE — PROGRESS NOTES
Subjective:   Lauren Bowden is a 68 y.o. female here today for evaluation and management of:     Other emphysema (HCC)  Chronic condition, Controlled on her inhalers, she has no oxygen requirement, no chronic cough and no wheezing.       Stage 3 chronic kidney disease  She is followed by her nephrologist Dr. Sandoval and was told she has only one functioning kidney  Her renal function is stable. GFR in 30s.     CAD (coronary artery disease)  Patient takes no cholesterol medication at present though was on lipitor and fish oil in the past.   Will check labs   She is on plavix but states this is for a TIA she had about 3 years ago. Has no stenting in her heart.     HTN (hypertension)  Chronic condition well controlled on felodipine 5 mg daily   She also takes lasix for control of LE edema 20 mg daily.       Colon cancer screening  She is followed by GI with yearly colonoscocopy for polyps.     TIA (transient ischemic attack)  Patient continues on plavix 75 mg daily for a TIA about 3 years ago.          Current medicines (including changes today)  Current Outpatient Prescriptions   Medication Sig Dispense Refill   • raloxifene (EVISTA) 60 MG Tab TAKE 1 TABLET BY MOUTH  EVERY DAY 90 Tab 0   • buPROPion SR (WELLBUTRIN-SR) 150 MG TABLET SR 12 HR sustained-release tablet TAKE 1 TABLET BY MOUTH  EVERY DAY 90 Tab 0   • COMBIVENT RESPIMAT  MCG/ACT Aero Soln USE 2 PUFFS 4 TIMES DAILY 24 g 0   • albuterol (PROVENTIL) 2.5mg/3ml Nebu Soln solution for nebulization 3 mL by Nebulization route every four hours as needed for Shortness of Breath. 75 mL 0   • clopidogrel (PLAVIX) 75 MG Tab TAKE 1 TABLET BY MOUTH  EVERY  Tab 0   • omeprazole (PRILOSEC) 20 MG delayed-release capsule Take 1 Cap by mouth every day. 90 Cap 3   • furosemide (LASIX) 20 MG Tab Take 1 Tab by mouth 2 times a day. 60 Tab 11   • albuterol-ipratropium (COMBIVENT)  MCG/ACT Aerosol Inhale 2 Puffs by mouth 4 times a day. 1 Inhaler 6   • cefdinir  "(OMNICEF) 300 MG Cap Take 1 Cap by mouth 2 times a day. 22 Cap 0   • cyclobenzaprine (FLEXERIL) 10 MG Tab Take 1 Tab by mouth 3 times a day as needed. FOR MILD PAIN. 90 Tab 0   • fluticasone-salmeterol (ADVAIR DISKUS) 250-50 MCG/DOSE AEROSOL POWDER, BREATH ACTIVATED Inhale 1 Puff by mouth every 12 hours. 1 Inhaler 6   • felodipine (PLENDIL) 5 MG TABLET SR 24 HR Take 10 mg by mouth every day.     • NON SPECIFIED Cleared for surgery 1 Each 0   • NON SPECIFIED Cleared for surgery 1 Each 0     No current facility-administered medications for this visit.      She  has a past medical history of CKD (chronic kidney disease) stage 4, GFR 15-29 ml/min ([facility]) (7/1/2013); COPD; Hypercholesteremia; Hypertension; Ovarian neoplasm (8/10/2017); Pulmonary emphysema ([facility]); Stroke ([facility]); and Vitamin d deficiency (7/1/2013).    ROS  No chest pain, no shortness of breath, no abdominal pain       Objective:     Blood pressure 124/68, pulse 89, temperature 36.8 °C (98.2 °F), resp. rate 16, height 1.626 m (5' 4\"), weight 83.9 kg (185 lb), SpO2 93 %. Body mass index is 31.76 kg/m².   Physical Exam:  Constitutional: Alert, no distress.  Skin: Warm, dry, good turgor, no rashes in visible areas.  Eye: Equal, round and reactive, conjunctiva clear, lids normal.  ENMT: Lips without lesions, good dentition, oropharynx clear.  Neck: Trachea midline, no masses, no thyromegaly. No cervical or supraclavicular lymphadenopathy  Respiratory: Unlabored respiratory effort, lungs clear to auscultation, no wheezes, no ronchi.  Cardiovascular: Normal S1, S2, no murmur, no edema.  Abdomen: Soft, non-tender, no masses, no hepatosplenomegaly.  Psych: Alert and oriented x3, normal affect and mood.        Assessment and Plan:   The following treatment plan was discussed    1. Other emphysema (HCC)  Controlled. Continue inhalers.     2. Stage 3 chronic kidney disease  Continue follow up with nephrology.     3. Coronary artery disease involving native heart without " angina pectoris, unspecified vessel or lesion type  Stable. Blood pressure controlled. Continues plavix, will check lipid profile.     4. Essential hypertension  Controlled on current medication.     5. Colon cancer screening  Follow up with GI.     6. Transient cerebral ischemia, unspecified type  Continue on plavix.     7. Obesity (BMI 30-39.9)  - Patient identified as having weight management issue.  Appropriate orders and counseling given.    8. Dyslipidemia  - LIPID PROFILE; Future      Followup: No Follow-up on file.

## 2018-09-11 DIAGNOSIS — R06.02 SOB (SHORTNESS OF BREATH): ICD-10-CM

## 2018-09-11 RX ORDER — ALBUTEROL SULFATE 2.5 MG/3ML
2.5 SOLUTION RESPIRATORY (INHALATION) EVERY 4 HOURS PRN
Qty: 75 ML | Refills: 0 | Status: SHIPPED | OUTPATIENT
Start: 2018-09-11 | End: 2018-10-11 | Stop reason: SDUPTHER

## 2018-09-24 ENCOUNTER — OFFICE VISIT (OUTPATIENT)
Dept: URGENT CARE | Facility: PHYSICIAN GROUP | Age: 68
End: 2018-09-24
Payer: MEDICARE

## 2018-09-24 VITALS
DIASTOLIC BLOOD PRESSURE: 90 MMHG | TEMPERATURE: 97.8 F | SYSTOLIC BLOOD PRESSURE: 130 MMHG | HEART RATE: 106 BPM | RESPIRATION RATE: 14 BRPM | OXYGEN SATURATION: 94 % | WEIGHT: 172 LBS | HEIGHT: 64 IN | BODY MASS INDEX: 29.37 KG/M2

## 2018-09-24 DIAGNOSIS — M10.9 ACUTE GOUT INVOLVING TOE OF RIGHT FOOT, UNSPECIFIED CAUSE: ICD-10-CM

## 2018-09-24 PROCEDURE — 99214 OFFICE O/P EST MOD 30 MIN: CPT | Performed by: PHYSICIAN ASSISTANT

## 2018-09-24 RX ORDER — INDOMETHACIN 50 MG/1
50 CAPSULE ORAL 3 TIMES DAILY PRN
Qty: 20 CAP | Refills: 0 | Status: SHIPPED | OUTPATIENT
Start: 2018-09-24 | End: 2018-10-11

## 2018-09-24 RX ORDER — FUROSEMIDE 20 MG/1
TABLET ORAL
COMMUNITY
Start: 2018-08-30 | End: 2018-10-11

## 2018-09-24 RX ORDER — BUPROPION HYDROCHLORIDE 150 MG/1
TABLET, EXTENDED RELEASE ORAL
COMMUNITY
Start: 2018-08-30 | End: 2018-10-11

## 2018-09-24 RX ORDER — ALBUTEROL SULFATE 2.5 MG/3ML
SOLUTION RESPIRATORY (INHALATION)
COMMUNITY
Start: 2018-09-11 | End: 2019-01-14

## 2018-09-24 RX ORDER — OMEPRAZOLE 20 MG/1
CAPSULE, DELAYED RELEASE ORAL
COMMUNITY
Start: 2018-08-30 | End: 2018-10-11

## 2018-09-24 RX ORDER — RALOXIFENE HYDROCHLORIDE 60 MG/1
TABLET, FILM COATED ORAL
COMMUNITY
Start: 2018-08-30 | End: 2018-10-11

## 2018-09-24 NOTE — PROGRESS NOTES
Chief Complaint   Patient presents with   • Gout     R big toe [id number]/ pt thinks it might be Gout       HISTORY OF PRESENT ILLNESS: Patient is a 68 y.o. female who presents today for the following:    Right great toe, pain/redness x 10 days  H/o gout  Pain with slightest touch  Improving but still there  Hasn't tried any OTC meds     Patient Active Problem List    Diagnosis Date Noted   • Ovarian neoplasm 08/10/2017     Priority: High   • Other emphysema (HCC) 07/11/2018   • Colon cancer screening 07/11/2018   • TIA (transient ischemic attack) 07/11/2018   • Obesity (BMI 30-39.9) 07/11/2018   • Ureteral obstruction, right 05/11/2018   • Stage 3 chronic kidney disease 08/11/2017   • Lung nodule, multiple 08/11/2017   • CAD (coronary artery disease) 02/23/2010   • HTN (hypertension) 02/23/2010       Allergies:Patient has no known allergies.    Current Outpatient Prescriptions Ordered in [facility]   Medication Sig Dispense Refill   • indomethacin (INDOCIN) 50 MG Cap Take 1 Cap by mouth 3 times a day as needed (pain). 20 Cap 0   • buPROPion SR (WELLBUTRIN-SR) 150 MG TABLET SR 12 HR sustained-release tablet      • raloxifene (EVISTA) 60 MG Tab      • omeprazole (PRILOSEC) 20 MG delayed-release capsule      • furosemide (LASIX) 20 MG Tab      • albuterol (PROVENTIL) 2.5mg/3ml Nebu Soln solution for nebulization      • albuterol (PROVENTIL) 2.5mg/3ml Nebu Soln solution for nebulization 3 mL by Nebulization route every four hours as needed for Shortness of Breath. 75 mL 0   • clopidogrel (PLAVIX) 75 MG Tab TAKE 1 TABLET BY MOUTH  EVERY  Tab 1   • raloxifene (EVISTA) 60 MG Tab TAKE 1 TABLET BY MOUTH  EVERY DAY 90 Tab 0   • buPROPion SR (WELLBUTRIN-SR) 150 MG TABLET SR 12 HR sustained-release tablet TAKE 1 TABLET BY MOUTH  EVERY DAY 90 Tab 0   • COMBIVENT RESPIMAT  MCG/ACT Aero Soln USE 2 PUFFS 4 TIMES DAILY 24 g 0   • NON SPECIFIED Cleared for surgery 1 Each 0   • omeprazole (PRILOSEC) 20 MG delayed-release  capsule Take 1 Cap by mouth every day. 90 Cap 3   • furosemide (LASIX) 20 MG Tab Take 1 Tab by mouth 2 times a day. 60 Tab 11   • albuterol-ipratropium (COMBIVENT)  MCG/ACT Aerosol Inhale 2 Puffs by mouth 4 times a day. 1 Inhaler 6   • NON SPECIFIED Cleared for surgery 1 Each 0   • cefdinir (OMNICEF) 300 MG Cap Take 1 Cap by mouth 2 times a day. 22 Cap 0   • cyclobenzaprine (FLEXERIL) 10 MG Tab Take 1 Tab by mouth 3 times a day as needed. FOR MILD PAIN. 90 Tab 0   • fluticasone-salmeterol (ADVAIR DISKUS) 250-50 MCG/DOSE AEROSOL POWDER, BREATH ACTIVATED Inhale 1 Puff by mouth every 12 hours. 1 Inhaler 6   • felodipine (PLENDIL) 5 MG TABLET SR 24 HR Take 10 mg by mouth every day.       No current [facility]-ordered facility-administered medications on file.        Past Medical History:   Diagnosis Date   • CKD (chronic kidney disease) stage 4, GFR 15-29 ml/min (HCC) 2013    Did see Dr. Perkins; current GFR 27 13; Has both kidneys, only one is functioning.   • COPD    • Hypercholesteremia    • Hypertension    • Ovarian neoplasm 8/10/2017    Incidental finding of the CT Renal  - 6.8 x 6.8 x 6 cm cystic left adnexal lesion may represent an ovarian cystadenoma or cystadenocarcinoma.     • Pulmonary emphysema (HCC)    • Stroke (HCC)    • Vitamin d deficiency 2013    Was taking 2000 - still 26; incr to 4000       Social History   Substance Use Topics   • Smoking status: Former Smoker     Packs/day: 1.00     Years: 40.00     Types: Cigarettes     Quit date: 10/1/2010   • Smokeless tobacco: Never Used   • Alcohol use Yes      Comment: rare        Family Status   Relation Status   • Mo  at age 35         due to complications of perforated gallbladder and peritonitis   • Fa  at age 74        Heart Problems    • Sis Alive     Family History   Problem Relation Age of Onset   • Diabetes Sister    • Cancer Sister        Review of Systems:  Constitutional ROS: No unexpected change in weight, No  "weakness, No fatigue  Eye ROS: No recent significant change in vision, No eye pain, redness, discharge  Ear ROS: No drainage, No tinnitus or vertigo, No recent change in hearing  Mouth/Throat ROS: No teeth or gum problems, No bleeding gums, No tongue complaints  Neck ROS: No swollen glands, No significant pain in neck  Pulmonary ROS: No chronic cough, sputum, or hemoptysis, No dyspnea on exertion, No wheezing  Cardiovascular ROS: No diaphoresis, No edema, No palpitations  Gastrointestinal ROS: No change in bowel habits, No significant change in appetite, No nausea, vomiting, diarrhea, or constipation  Hematologic/Lymphatic ROS: No chills, No night sweats, No weight loss  Skin/Integumentary ROS: No edema, No evidence of rash, No itching      Exam:  Blood pressure 130/90, pulse (!) 106, temperature 36.6 °C (97.8 °F), temperature source Temporal, resp. rate 14, height 1.626 m (5' 4\"), weight 78 kg (172 lb), SpO2 94 %, not currently breastfeeding.  General: Well developed, well nourished. No distress.  HENT: Head is grossly normal.  Pulmonary: Unlabored respiratory effort.   Cardiovascular: Brisk cap refill in the right foot.   Extremities: Marked erythema noted at th 1st MTP of the right foot with associated pain. No other erythema is noted on the foot or ankle.   Neurologic: Grossly nonfocal. No facial asymmetry noted.  Skin: Warm, dry, good turgor. No rashes in visible areas.   Psych: Normal mood. Alert and oriented x3. Judgment and insight is normal.    Assessment/Plan:  Estimated CrCl is 46.3. No dose adjustment needed for indomethacin. Take all meds as directed. Follow up for worsening or persistent symptoms.  1. Acute gout involving toe of right foot, unspecified cause  indomethacin (INDOCIN) 50 MG Cap       "

## 2018-10-11 ENCOUNTER — OFFICE VISIT (OUTPATIENT)
Dept: MEDICAL GROUP | Facility: PHYSICIAN GROUP | Age: 68
End: 2018-10-11
Payer: MEDICARE

## 2018-10-11 VITALS
OXYGEN SATURATION: 91 % | BODY MASS INDEX: 30.08 KG/M2 | TEMPERATURE: 98 F | HEIGHT: 64 IN | DIASTOLIC BLOOD PRESSURE: 78 MMHG | SYSTOLIC BLOOD PRESSURE: 128 MMHG | HEART RATE: 91 BPM | WEIGHT: 176.2 LBS | RESPIRATION RATE: 16 BRPM

## 2018-10-11 DIAGNOSIS — R06.02 SOB (SHORTNESS OF BREATH): ICD-10-CM

## 2018-10-11 DIAGNOSIS — N83.8 OVARIAN MASS, LEFT: ICD-10-CM

## 2018-10-11 DIAGNOSIS — M1A.0710 CHRONIC GOUT OF RIGHT FOOT, UNSPECIFIED CAUSE: ICD-10-CM

## 2018-10-11 DIAGNOSIS — E78.5 DYSLIPIDEMIA: ICD-10-CM

## 2018-10-11 DIAGNOSIS — Z87.891 HISTORY OF NICOTINE DEPENDENCE: ICD-10-CM

## 2018-10-11 DIAGNOSIS — J96.11 CHRONIC RESPIRATORY FAILURE WITH HYPOXIA (HCC): ICD-10-CM

## 2018-10-11 DIAGNOSIS — Z87.891 HISTORY OF TOBACCO USE: ICD-10-CM

## 2018-10-11 DIAGNOSIS — Z23 NEED FOR VACCINATION: ICD-10-CM

## 2018-10-11 DIAGNOSIS — N18.30 STAGE 3 CHRONIC KIDNEY DISEASE (HCC): ICD-10-CM

## 2018-10-11 DIAGNOSIS — I25.10 CORONARY ARTERY DISEASE INVOLVING NATIVE HEART WITHOUT ANGINA PECTORIS, UNSPECIFIED VESSEL OR LESION TYPE: ICD-10-CM

## 2018-10-11 DIAGNOSIS — I10 ESSENTIAL HYPERTENSION: ICD-10-CM

## 2018-10-11 PROBLEM — N13.5 URETERAL OBSTRUCTION, RIGHT: Status: RESOLVED | Noted: 2018-05-11 | Resolved: 2018-10-11

## 2018-10-11 PROCEDURE — G0008 ADMIN INFLUENZA VIRUS VAC: HCPCS | Performed by: FAMILY MEDICINE

## 2018-10-11 PROCEDURE — 99214 OFFICE O/P EST MOD 30 MIN: CPT | Mod: 25 | Performed by: FAMILY MEDICINE

## 2018-10-11 PROCEDURE — 90662 IIV NO PRSV INCREASED AG IM: CPT | Performed by: FAMILY MEDICINE

## 2018-10-11 RX ORDER — ROSUVASTATIN CALCIUM 10 MG/1
10 TABLET, COATED ORAL EVERY EVENING
Qty: 90 TAB | Refills: 1 | Status: SHIPPED | OUTPATIENT
Start: 2018-10-11 | End: 2019-02-18 | Stop reason: SDUPTHER

## 2018-10-11 RX ORDER — ALBUTEROL SULFATE 2.5 MG/3ML
2.5 SOLUTION RESPIRATORY (INHALATION) EVERY 4 HOURS PRN
Qty: 75 ML | Refills: 11 | Status: SHIPPED | OUTPATIENT
Start: 2018-10-11

## 2018-10-11 RX ORDER — ALLOPURINOL 100 MG/1
100 TABLET ORAL DAILY
Qty: 90 TAB | Refills: 3 | Status: SHIPPED | OUTPATIENT
Start: 2018-10-11 | End: 2019-05-03 | Stop reason: SDUPTHER

## 2018-10-11 ASSESSMENT — PATIENT HEALTH QUESTIONNAIRE - PHQ9: CLINICAL INTERPRETATION OF PHQ2 SCORE: 0

## 2018-10-11 NOTE — ASSESSMENT & PLAN NOTE
She had a normal stress test 2013, does not recall every having a heart attack.   She has a history of smoking, does not smoke now.   Elevated cholesterol in 2014, she was not able to get labs done.   Currently not on a statin. She was on lipitor but stopped it due to muscle cramps. But muscle cramps did not go away but are not everyday.

## 2018-10-11 NOTE — ASSESSMENT & PLAN NOTE
Will treat with allopurinol 100mg as she has CKD stage 3.   Had three episodes of pain in right foot in last 2 years.

## 2018-10-11 NOTE — ASSESSMENT & PLAN NOTE
Had a Left adnexal cystic lesion and was to have close out patient follow up with gynecology.   She has not been able to follow up with gynecology as she and her  have been sick.   Last US of pelvis: august 2017  6.8 cm cystic left adnexal lesion may represent an ovarian cystadenoma or cystadenocarcinoma. Gynecological evaluation recommended.  Right ovary not visualized.  Inhomogeneous appearance of the uterus.  Will order repeat pelvic US and referral to gyn for follow up.

## 2018-10-11 NOTE — ASSESSMENT & PLAN NOTE
Chronic condition stable GFR in 30s, followed by nephrology, she was told she has only one functioning kidney CT scan august 2017 showed: There is renal atrophy on the left. Renal cortical scarring is seen on the right.   Has follow up renal US ordered Dr. Mon her nephrologist.

## 2018-10-11 NOTE — ASSESSMENT & PLAN NOTE
Chronic condition, she has emphysema  Her oxygen on room air at rest was 91%  Oxygen level with exertion dropped to 87% on room air and improved to 93% with 2L oxygen supplementation.   Medically necessary to continue with oxygen supplementation.   She does not smoke anymore.

## 2018-10-12 NOTE — PROGRESS NOTES
Subjective:   Lauren Bowden is a 68 y.o. female here today for evaluation and management of:     Ovarian mass, left  Had a Left adnexal cystic lesion and was to have close out patient follow up with gynecology.   She has not been able to follow up with gynecology as she and her  have been sick.   Last US of pelvis: august 2017  6.8 cm cystic left adnexal lesion may represent an ovarian cystadenoma or cystadenocarcinoma. Gynecological evaluation recommended.  Right ovary not visualized.  Inhomogeneous appearance of the uterus.  Will order repeat pelvic US and referral to gyn for follow up.     Stage 3 chronic kidney disease  Chronic condition stable GFR in 30s, followed by nephrology, she was told she has only one functioning kidney CT scan august 2017 showed: There is renal atrophy on the left. Renal cortical scarring is seen on the right.   Has follow up renal US ordered Dr. Mon her nephrologist.     Chronic respiratory failure with hypoxia (HCC)  Chronic condition, she has emphysema  Her oxygen on room air at rest was 91%  Oxygen level with exertion dropped to 87% on room air and improved to 93% with 2L oxygen supplementation.   Medically necessary to continue with oxygen supplementation.   She does not smoke anymore.     CAD (coronary artery disease)  She had a normal stress test 2013, does not recall every having a heart attack.   She has a history of smoking, does not smoke now.   Elevated cholesterol in 2014, she was not able to get labs done.   Currently not on a statin. She was on lipitor but stopped it due to muscle cramps. But muscle cramps did not go away but are not everyday.     HTN (hypertension)  Well controlled on felodipine 5 mg and lasix for LE edema 20 mg daily.     Chronic gout of right foot  Will treat with allopurinol 100mg as she has CKD stage 3.   Had three episodes of pain in right foot in last 2 years.          Current medicines (including changes today)  Current Outpatient  "Prescriptions   Medication Sig Dispense Refill   • rosuvastatin (CRESTOR) 10 MG Tab Take 1 Tab by mouth every evening. 90 Tab 1   • albuterol (PROVENTIL) 2.5mg/3ml Nebu Soln solution for nebulization 3 mL by Nebulization route every four hours as needed for Shortness of Breath. 75 mL 11   • allopurinol (ZYLOPRIM) 100 MG Tab Take 1 Tab by mouth every day. 90 Tab 3   • albuterol (PROVENTIL) 2.5mg/3ml Nebu Soln solution for nebulization      • clopidogrel (PLAVIX) 75 MG Tab TAKE 1 TABLET BY MOUTH  EVERY  Tab 1   • raloxifene (EVISTA) 60 MG Tab TAKE 1 TABLET BY MOUTH  EVERY DAY 90 Tab 0   • buPROPion SR (WELLBUTRIN-SR) 150 MG TABLET SR 12 HR sustained-release tablet TAKE 1 TABLET BY MOUTH  EVERY DAY 90 Tab 0   • COMBIVENT RESPIMAT  MCG/ACT Aero Soln USE 2 PUFFS 4 TIMES DAILY 24 g 0   • omeprazole (PRILOSEC) 20 MG delayed-release capsule Take 1 Cap by mouth every day. 90 Cap 3   • furosemide (LASIX) 20 MG Tab Take 1 Tab by mouth 2 times a day. 60 Tab 11   • cyclobenzaprine (FLEXERIL) 10 MG Tab Take 1 Tab by mouth 3 times a day as needed. FOR MILD PAIN. 90 Tab 0   • felodipine (PLENDIL) 5 MG TABLET SR 24 HR Take 10 mg by mouth every day.     • fluticasone-salmeterol (ADVAIR DISKUS) 250-50 MCG/DOSE AEROSOL POWDER, BREATH ACTIVATED Inhale 1 Puff by mouth every 12 hours. 1 Inhaler 6     No current facility-administered medications for this visit.      She  has a past medical history of CKD (chronic kidney disease) stage 4, GFR 15-29 ml/min ([facility]) (7/1/2013); COPD; Hypercholesteremia; Hypertension; Ovarian neoplasm (8/10/2017); Pulmonary emphysema ([facility]); Stroke ([facility]); and Vitamin d deficiency (7/1/2013).    ROS  No chest pain, no shortness of breath, no abdominal pain       Objective:     Blood pressure 128/78, pulse 91, temperature 36.7 °C (98 °F), temperature source Temporal, resp. rate 16, height 1.626 m (5' 4\"), weight 79.9 kg (176 lb 3.2 oz), SpO2 91 %, not currently breastfeeding. Body mass index is 30.24 " kg/m².   Physical Exam:  Constitutional: Alert, no distress.  Skin: Warm, dry, good turgor, no rashes in visible areas.  Eye: Equal, round and reactive, conjunctiva clear, lids normal.  ENMT: Lips without lesions, good dentition, oropharynx clear.  Neck: Trachea midline, no masses, no thyromegaly. No cervical or supraclavicular lymphadenopathy  Respiratory: Unlabored respiratory effort, lungs clear to auscultation, no wheezes, no ronchi.  Cardiovascular: Normal S1, S2, no murmur, no edema.  Abdomen: Soft, non-tender, no masses, no hepatosplenomegaly.  Psych: Alert and oriented x3, normal affect and mood.        Assessment and Plan:   The following treatment plan was discussed    1. Ovarian mass, left  - US-PELVIC TRANSVAGINAL ONLY; Future    2. Stage 3 chronic kidney disease (HCC)  Follow up with renal US, labs and with nephrologist.     3. Chronic respiratory failure with hypoxia (HCC)  Chronic condition, needs to continue with oxygen supplementation.     4. Coronary artery disease involving native heart without angina pectoris, unspecified vessel or lesion type  No hx of MI.   Continue plavix and statin.     5. Essential hypertension  Controlled.     6. Need for vaccination  - INFLUENZA VACCINE, HIGH DOSE (65+ ONLY)    7. Dyslipidemia  Continue crestor.     8. History of tobacco use  - REFERRAL TO LUNG CANCER SCREENING PROGRAM    9. History of nicotine dependence  - REFERRAL TO LUNG CANCER SCREENING PROGRAM    10. SOB (shortness of breath)  Chronic condition, stable. Continue albuterol.   - albuterol (PROVENTIL) 2.5mg/3ml Nebu Soln solution for nebulization; 3 mL by Nebulization route every four hours as needed for Shortness of Breath.  Dispense: 75 mL; Refill: 11    11. Chronic gout of right foot, unspecified cause  Advised on diet changes,   Low dose allopurinol 100mg daily, monitor renal function       Followup: Return in about 6 months (around 4/11/2019) for please print labs ordered by Dr. Mon in  may.

## 2018-10-16 DIAGNOSIS — I25.10 CORONARY ARTERY DISEASE INVOLVING NATIVE CORONARY ARTERY OF NATIVE HEART WITHOUT ANGINA PECTORIS: ICD-10-CM

## 2018-10-16 DIAGNOSIS — I15.9 SECONDARY HYPERTENSION: ICD-10-CM

## 2018-10-16 DIAGNOSIS — M54.9 OTHER CHRONIC BACK PAIN: ICD-10-CM

## 2018-10-16 DIAGNOSIS — G89.29 OTHER CHRONIC BACK PAIN: ICD-10-CM

## 2018-10-16 DIAGNOSIS — M81.0 OSTEOPOROSIS, UNSPECIFIED OSTEOPOROSIS TYPE, UNSPECIFIED PATHOLOGICAL FRACTURE PRESENCE: ICD-10-CM

## 2018-10-16 DIAGNOSIS — K21.9 GERD WITHOUT ESOPHAGITIS: ICD-10-CM

## 2018-10-16 DIAGNOSIS — I10 ESSENTIAL HYPERTENSION: ICD-10-CM

## 2018-10-16 DIAGNOSIS — Z72.0 TOBACCO USE: ICD-10-CM

## 2018-10-16 RX ORDER — FUROSEMIDE 20 MG/1
20 TABLET ORAL 2 TIMES DAILY
Qty: 180 TAB | Refills: 1 | Status: SHIPPED | OUTPATIENT
Start: 2018-10-16 | End: 2019-02-18 | Stop reason: SDUPTHER

## 2018-10-16 RX ORDER — CLOPIDOGREL BISULFATE 75 MG/1
75 TABLET ORAL
Qty: 90 TAB | Refills: 3 | Status: SHIPPED | OUTPATIENT
Start: 2018-10-16 | End: 2019-05-03 | Stop reason: SDUPTHER

## 2018-10-16 RX ORDER — FELODIPINE 5 MG/1
10 TABLET, EXTENDED RELEASE ORAL DAILY
Qty: 270 TAB | Refills: 1 | Status: SHIPPED | OUTPATIENT
Start: 2018-10-16 | End: 2019-02-18 | Stop reason: SDUPTHER

## 2018-10-16 RX ORDER — OMEPRAZOLE 20 MG/1
20 CAPSULE, DELAYED RELEASE ORAL
Qty: 90 CAP | Refills: 3 | Status: SHIPPED | OUTPATIENT
Start: 2018-10-16 | End: 2019-05-03 | Stop reason: SDUPTHER

## 2018-10-16 RX ORDER — RALOXIFENE HYDROCHLORIDE 60 MG/1
60 TABLET, FILM COATED ORAL
Qty: 90 TAB | Refills: 1 | Status: ON HOLD | OUTPATIENT
Start: 2018-10-16 | End: 2019-02-07

## 2018-10-16 RX ORDER — BUPROPION HYDROCHLORIDE 150 MG/1
150 TABLET, EXTENDED RELEASE ORAL
Qty: 90 TAB | Refills: 1 | Status: SHIPPED | OUTPATIENT
Start: 2018-10-16 | End: 2019-03-20 | Stop reason: SDUPTHER

## 2018-10-16 NOTE — TELEPHONE ENCOUNTER
-Dr Bay- Combivent respimat is usually prescribed 1 puff 4 times daily was was originally prescribed by Dr Ordaz as 2 puffs 4 times a day. Also please prescribe Flexeril as you see fit.   -Has established with Dr Bay and has discussed these meds already 7/18 and 10/18. 12 months of Plavix, Prilosec and 6 months on Plendil, Wellbutrin, BP and Evista.

## 2018-10-16 NOTE — TELEPHONE ENCOUNTER
Was the patient seen in the last year in this department? Yes    Does patient have an active prescription for medications requested? No     Received Request Via: Patient     Asking for 90 days plus refills for mail order

## 2018-10-17 ENCOUNTER — HOSPITAL ENCOUNTER (OUTPATIENT)
Dept: RADIOLOGY | Facility: MEDICAL CENTER | Age: 68
End: 2018-10-17
Attending: FAMILY MEDICINE
Payer: MEDICARE

## 2018-10-17 ENCOUNTER — HOSPITAL ENCOUNTER (OUTPATIENT)
Dept: RADIOLOGY | Facility: MEDICAL CENTER | Age: 68
End: 2018-10-17
Attending: INTERNAL MEDICINE
Payer: MEDICARE

## 2018-10-17 DIAGNOSIS — N83.8 OVARIAN MASS, LEFT: ICD-10-CM

## 2018-10-17 DIAGNOSIS — N83.209 CYST OF OVARY, UNSPECIFIED LATERALITY: ICD-10-CM

## 2018-10-17 DIAGNOSIS — N13.5 URETERAL OBSTRUCTION, RIGHT: ICD-10-CM

## 2018-10-17 DIAGNOSIS — N83.8 OVARIAN MASS: ICD-10-CM

## 2018-10-17 DIAGNOSIS — N18.30 STAGE 3 CHRONIC KIDNEY DISEASE (HCC): ICD-10-CM

## 2018-10-17 PROCEDURE — 76830 TRANSVAGINAL US NON-OB: CPT

## 2018-10-17 PROCEDURE — 76775 US EXAM ABDO BACK WALL LIM: CPT

## 2018-10-17 RX ORDER — CYCLOBENZAPRINE HCL 10 MG
10 TABLET ORAL 3 TIMES DAILY PRN
Qty: 90 TAB | Refills: 3 | Status: SHIPPED | OUTPATIENT
Start: 2018-10-17 | End: 2019-03-20 | Stop reason: SDUPTHER

## 2018-10-30 ENCOUNTER — HOSPITAL ENCOUNTER (OUTPATIENT)
Dept: LAB | Facility: MEDICAL CENTER | Age: 68
End: 2018-10-30
Attending: FAMILY MEDICINE
Payer: MEDICARE

## 2018-10-30 ENCOUNTER — HOSPITAL ENCOUNTER (OUTPATIENT)
Dept: LAB | Facility: MEDICAL CENTER | Age: 68
End: 2018-10-30
Attending: INTERNAL MEDICINE
Payer: MEDICARE

## 2018-10-30 DIAGNOSIS — N18.30 STAGE 3 CHRONIC KIDNEY DISEASE (HCC): ICD-10-CM

## 2018-10-30 DIAGNOSIS — E78.5 DYSLIPIDEMIA: ICD-10-CM

## 2018-10-30 LAB
25(OH)D3 SERPL-MCNC: 32 NG/ML (ref 30–100)
ANION GAP SERPL CALC-SCNC: 10 MMOL/L (ref 0–11.9)
BUN SERPL-MCNC: 19 MG/DL (ref 8–22)
CALCIUM SERPL-MCNC: 9.9 MG/DL (ref 8.5–10.5)
CHLORIDE SERPL-SCNC: 104 MMOL/L (ref 96–112)
CHOLEST SERPL-MCNC: 199 MG/DL (ref 100–199)
CO2 SERPL-SCNC: 26 MMOL/L (ref 20–33)
CREAT SERPL-MCNC: 1.67 MG/DL (ref 0.5–1.4)
CREAT UR-MCNC: 94.4 MG/DL
ERYTHROCYTE [DISTWIDTH] IN BLOOD BY AUTOMATED COUNT: 49.6 FL (ref 35.9–50)
FASTING STATUS PATIENT QL REPORTED: NORMAL
GLUCOSE SERPL-MCNC: 104 MG/DL (ref 65–99)
HCT VFR BLD AUTO: 51.6 % (ref 37–47)
HDLC SERPL-MCNC: 60 MG/DL
HGB BLD-MCNC: 16.3 G/DL (ref 12–16)
LDLC SERPL CALC-MCNC: 106 MG/DL
MCH RBC QN AUTO: 29.5 PG (ref 27–33)
MCHC RBC AUTO-ENTMCNC: 31.6 G/DL (ref 33.6–35)
MCV RBC AUTO: 93.5 FL (ref 81.4–97.8)
MICROALBUMIN UR-MCNC: 6.3 MG/DL
MICROALBUMIN/CREAT UR: 67 MG/G (ref 0–30)
PLATELET # BLD AUTO: 252 K/UL (ref 164–446)
PMV BLD AUTO: 10.2 FL (ref 9–12.9)
POTASSIUM SERPL-SCNC: 4.1 MMOL/L (ref 3.6–5.5)
PTH-INTACT SERPL-MCNC: 180.2 PG/ML (ref 14–72)
RBC # BLD AUTO: 5.52 M/UL (ref 4.2–5.4)
SODIUM SERPL-SCNC: 140 MMOL/L (ref 135–145)
TRIGL SERPL-MCNC: 165 MG/DL (ref 0–149)
WBC # BLD AUTO: 7.4 K/UL (ref 4.8–10.8)

## 2018-10-30 PROCEDURE — 36415 COLL VENOUS BLD VENIPUNCTURE: CPT

## 2018-10-30 PROCEDURE — 85027 COMPLETE CBC AUTOMATED: CPT

## 2018-10-30 PROCEDURE — 80061 LIPID PANEL: CPT

## 2018-10-30 PROCEDURE — 80048 BASIC METABOLIC PNL TOTAL CA: CPT

## 2018-10-30 PROCEDURE — 83970 ASSAY OF PARATHORMONE: CPT

## 2018-10-30 PROCEDURE — 82306 VITAMIN D 25 HYDROXY: CPT

## 2018-10-30 PROCEDURE — 82570 ASSAY OF URINE CREATININE: CPT

## 2018-10-30 PROCEDURE — 82043 UR ALBUMIN QUANTITATIVE: CPT

## 2018-11-07 ENCOUNTER — GYNECOLOGY VISIT (OUTPATIENT)
Dept: OBGYN | Facility: CLINIC | Age: 68
End: 2018-11-07
Payer: MEDICARE

## 2018-11-07 VITALS
HEIGHT: 64 IN | WEIGHT: 177 LBS | DIASTOLIC BLOOD PRESSURE: 74 MMHG | SYSTOLIC BLOOD PRESSURE: 128 MMHG | BODY MASS INDEX: 30.22 KG/M2

## 2018-11-07 DIAGNOSIS — N83.202 CYST OF LEFT OVARY: ICD-10-CM

## 2018-11-07 DIAGNOSIS — D49.59 OVARIAN NEOPLASM: ICD-10-CM

## 2018-11-07 PROCEDURE — 99214 OFFICE O/P EST MOD 30 MIN: CPT | Performed by: OBSTETRICS & GYNECOLOGY

## 2018-11-07 NOTE — PROGRESS NOTES
68 y.o.  female previously seen for : Chief Complaint:  adnexal mass    Specialty Problems     None      . Patient now here in follow up. Patient seen 1 year ago , and dx of 6.8 cm left ovarian mass seen on CT scan and U/S . Patient was to have surgery, but apparent miscommunication . PAtient now returns after 1 year , and repeat scan . Patient denies ,. Pain , increasing girth, fatigue , N?V/ or diarrhea .   No LMP recorded. Patient is postmenopausal.      Subjective: Abdominal Pain: negative    Vaginal Bleedingnegative  Menstrual Cycle: normal: negative  Dysmenorrhea:negative:   Dyspareunia:negative  Urinary Symptoms: negative   Vaginal Discharge:{No          Current Outpatient Prescriptions:   •  cyclobenzaprine (FLEXERIL) 10 MG Tab, Take 1 Tab by mouth 3 times a day as needed. FOR MILD PAIN., Disp: 90 Tab, Rfl: 3  •  ipratropium-albuterol (COMBIVENT RESPIMAT)  MCG/ACT Aero Soln, Inhale 1 Puff by mouth 4 times a day., Disp: 3 Inhaler, Rfl: 3  •  clopidogrel (PLAVIX) 75 MG Tab, Take 1 Tab by mouth every day., Disp: 90 Tab, Rfl: 3  •  omeprazole (PRILOSEC) 20 MG delayed-release capsule, Take 1 Cap by mouth every day., Disp: 90 Cap, Rfl: 3  •  felodipine (PLENDIL) 5 MG TABLET SR 24 HR, Take 2 Tabs by mouth every day., Disp: 270 Tab, Rfl: 1  •  buPROPion SR (WELLBUTRIN-SR) 150 MG TABLET SR 12 HR sustained-release tablet, Take 1 Tab by mouth every day., Disp: 90 Tab, Rfl: 1  •  raloxifene (EVISTA) 60 MG Tab, Take 1 Tab by mouth every day., Disp: 90 Tab, Rfl: 1  •  furosemide (LASIX) 20 MG Tab, Take 1 Tab by mouth 2 times a day., Disp: 180 Tab, Rfl: 1  •  rosuvastatin (CRESTOR) 10 MG Tab, Take 1 Tab by mouth every evening., Disp: 90 Tab, Rfl: 1  •  albuterol (PROVENTIL) 2.5mg/3ml Nebu Soln solution for nebulization, 3 mL by Nebulization route every four hours as needed for Shortness of Breath., Disp: 75 mL, Rfl: 11  •  allopurinol (ZYLOPRIM) 100 MG Tab, Take 1 Tab by mouth every day., Disp: 90 Tab, Rfl: 3  •   albuterol (PROVENTIL) 2.5mg/3ml Nebu Soln solution for nebulization, , Disp: , Rfl:   •  fluticasone-salmeterol (ADVAIR DISKUS) 250-50 MCG/DOSE AEROSOL POWDER, BREATH ACTIVATED, Inhale 1 Puff by mouth every 12 hours., Disp: 1 Inhaler, Rfl: 6  ROS: no change in ROS since visit of : Visit date not found.  :  Recent Results (from the past 336 hour(s))   LIPID PROFILE    Collection Time: 10/30/18  8:22 AM   Result Value Ref Range    Cholesterol,Tot 199 100 - 199 mg/dL    Triglycerides 165 (H) 0 - 149 mg/dL    HDL 60 >=40 mg/dL     (H) <100 mg/dL   FASTING STATUS    Collection Time: 10/30/18  8:22 AM   Result Value Ref Range    Fasting Status Fasting    PTH INTACT (PTH ONLY)    Collection Time: 10/30/18  8:23 AM   Result Value Ref Range    Pth, Intact 180.2 (H) 14.0 - 72.0 pg/mL   BASIC METABOLIC PANEL    Collection Time: 10/30/18  8:23 AM   Result Value Ref Range    Sodium 140 135 - 145 mmol/L    Potassium 4.1 3.6 - 5.5 mmol/L    Chloride 104 96 - 112 mmol/L    Co2 26 20 - 33 mmol/L    Glucose 104 (H) 65 - 99 mg/dL    Bun 19 8 - 22 mg/dL    Creatinine 1.67 (H) 0.50 - 1.40 mg/dL    Calcium 9.9 8.5 - 10.5 mg/dL    Anion Gap 10.0 0.0 - 11.9   VITAMIN D,25 HYDROXY    Collection Time: 10/30/18  8:23 AM   Result Value Ref Range    25-Hydroxy   Vitamin D 25 32 30 - 100 ng/mL   CBC WITHOUT DIFFERENTIAL    Collection Time: 10/30/18  8:23 AM   Result Value Ref Range    WBC 7.4 4.8 - 10.8 K/uL    RBC 5.52 (H) 4.20 - 5.40 M/uL    Hemoglobin 16.3 (H) 12.0 - 16.0 g/dL    Hematocrit 51.6 (H) 37.0 - 47.0 %    MCV 93.5 81.4 - 97.8 fL    MCH 29.5 27.0 - 33.0 pg    MCHC 31.6 (L) 33.6 - 35.0 g/dL    RDW 49.6 35.9 - 50.0 fL    Platelet Count 252 164 - 446 K/uL    MPV 10.2 9.0 - 12.9 fL   MICROALBUMIN CREAT RATIO URINE    Collection Time: 10/30/18  8:23 AM   Result Value Ref Range    Creatinine, Urine 94.40 mg/dL    Microalbumin, Urine Random 6.3 mg/dL    Micro Alb Creat Ratio 67 (H) 0 - 30 mg/g   ESTIMATED GFR    Collection Time:  "10/30/18  8:23 AM   Result Value Ref Range    GFR If  37 (A) >60 mL/min/1.73 m 2    GFR If Non African American 30 (A) >60 mL/min/1.73 m 2       Vitals:    11/07/18 1108   BP: 128/74   BP Location: Right arm   Patient Position: Sitting   Weight: 80.3 kg (177 lb)   Height: 1.626 m (5' 4\")     Past Medical History:   Diagnosis Date   • CKD (chronic kidney disease) stage 4, GFR 15-29 ml/min (HCC) 7/1/2013    Did see Dr. Perkins; current GFR 27 6/26/13; Has both kidneys, only one is functioning.   • COPD    • Hypercholesteremia    • Hypertension    • Ovarian neoplasm 8/10/2017    Incidental finding of the CT Renal  - 6.8 x 6.8 x 6 cm cystic left adnexal lesion may represent an ovarian cystadenoma or cystadenocarcinoma.     • Pulmonary emphysema (HCC)    • Stroke (HCC)    • Vitamin d deficiency 7/1/2013    Was taking 2000 - still 26; incr to 4000     PGYN: None  Social History     Social History   • Marital status:      Spouse name: N/A   • Number of children: N/A   • Years of education: N/A     Occupational History   • Not on file.     Social History Main Topics   • Smoking status: Former Smoker     Packs/day: 1.00     Years: 40.00     Types: Cigarettes     Quit date: 10/1/2010   • Smokeless tobacco: Never Used   • Alcohol use Yes      Comment: rare    • Drug use: No   • Sexual activity: No      Comment:      Other Topics Concern   • Not on file     Social History Narrative   • No narrative on file     Family History   Problem Relation Age of Onset   • Diabetes Sister    • Cancer Sister      Past Surgical History:   Procedure Laterality Date   • APPENDECTOMY           Exam:   General : Awake, alert and oriented x 3  Abdominal : obese, no masses , but difficult exam , with large abdominal wall  no rebound or guarding  Pelvic :  external genitalia normal, Bartholin's glands, urethra, Tanaina's glands negative, vagina atrophic ;  Cervix atrophic , mobile small uterus , adnexal mass not felt !!!  " Pelvic Support system : normal      Ass: menopausal female   6.6 cm left ovarian mass : stable over 1 year , thus probable cystadenoma       Spent 30 minutes minutes with the patient ; Face to Face, with >50% of this time spent in counseling and coordination of care, surrounding the above mentioned issues as well as:discuss need to remove ,mass , as although probable benign , torsion , or rupture are possible scenarios , that would be emergencies .   P. Auth placed : for XLap - WEST/BSO   PAtient having cataract surgery in Jan. 2019. Schedule hyst for 2/2019    Follow up : Pre op   Will need surgical clearance , and stop Plavix 2 weeks before surgery

## 2018-11-08 ENCOUNTER — OFFICE VISIT (OUTPATIENT)
Dept: NEPHROLOGY | Facility: MEDICAL CENTER | Age: 68
End: 2018-11-08
Payer: MEDICARE

## 2018-11-08 VITALS
WEIGHT: 179 LBS | HEART RATE: 90 BPM | DIASTOLIC BLOOD PRESSURE: 76 MMHG | BODY MASS INDEX: 30.56 KG/M2 | OXYGEN SATURATION: 94 % | RESPIRATION RATE: 16 BRPM | TEMPERATURE: 97.6 F | SYSTOLIC BLOOD PRESSURE: 128 MMHG | HEIGHT: 64 IN

## 2018-11-08 DIAGNOSIS — I10 ESSENTIAL HYPERTENSION: ICD-10-CM

## 2018-11-08 DIAGNOSIS — N18.30 STAGE 3 CHRONIC KIDNEY DISEASE (HCC): ICD-10-CM

## 2018-11-08 PROCEDURE — 99213 OFFICE O/P EST LOW 20 MIN: CPT | Performed by: INTERNAL MEDICINE

## 2018-11-08 RX ORDER — CALCITRIOL 0.25 UG/1
0.25 CAPSULE, LIQUID FILLED ORAL
Qty: 45 CAP | Refills: 3 | Status: SHIPPED | OUTPATIENT
Start: 2018-11-08 | End: 2019-05-03 | Stop reason: SDUPTHER

## 2018-11-08 NOTE — PROGRESS NOTES
"Subjective:      Lauren Bowden is a 68 y.o. female who presents with CKD Stage 3 Follow-Up            HPI  68 year old with CKD III, history of renal obstruction s/p stent which is now removed, and US with bilateral atrophy. Has been doing well currently. Has HTN which has been controlled. No blood in the urine. Occasional use of ibuprofen for back pain, usually only about 3 x per month. No hematuria.      At this point seems to be doing well.  Serum creatinine is stable.  Microalbumin to creatinine ratio down to 67.    Review of Systems   All other systems reviewed and are negative.         Objective:     /76 (BP Location: Right arm, Patient Position: Sitting)   Pulse 90   Temp 36.4 °C (97.6 °F)   Resp 16   Ht 1.626 m (5' 4\")   Wt 81.2 kg (179 lb)   SpO2 94%   BMI 30.73 kg/m²      Physical Exam   Constitutional: She is oriented to person, place, and time. She appears well-developed and well-nourished.   HENT:   Head: Normocephalic and atraumatic.   Cardiovascular: Normal rate and regular rhythm.    Pulmonary/Chest: Effort normal and breath sounds normal.   Musculoskeletal: She exhibits no edema or deformity.   Neurological: She is alert and oriented to person, place, and time.   Skin: Skin is warm and dry.   Psychiatric: She has a normal mood and affect. Her behavior is normal.               Assessment/Plan:     1. Stage 3 chronic kidney disease (HCC)  Patient's chronic kidney disease stage III with a serum creatinine of 1.67 slightly up from last year.  However, this appears to be the expected rate of decline.  Of note, the proteinuria is much improved.  Blood pressure appears to be stable.  I expect stabilization of her renal function.  However, we will need to check labs.  Labs ordered for next visit.    - BASIC METABOLIC PANEL; Future  - PTH INTACT (PTH ONLY); Future  - VITAMIN D,25 HYDROXY; Future  - CBC WITHOUT DIFFERENTIAL; Future  - MICROALBUMIN CREAT RATIO URINE; Future    2. Essential " hypertension  Blood pressure medications reviewed.  No side effects from these medications.  Continue.

## 2018-11-26 ENCOUNTER — TELEPHONE (OUTPATIENT)
Dept: HEMATOLOGY ONCOLOGY | Facility: MEDICAL CENTER | Age: 68
End: 2018-11-26

## 2018-11-26 NOTE — TELEPHONE ENCOUNTER
Received referral to lung cancer screening program.  Chart review to assess for lung cancer screening program eligibility.   1. Age 55-77 yrs of age? Yes 68 y.o.  2. 30 pack year hx of smoking, or greater? Yes 1 [id number]= 40pkyr hx  3. Current smoker or if quit, has pt quit within last 15 yrs?Yes quit 2010  4. Any signs or symptoms of lung cancer? None noted  5. Previous history of lung cancer? None noted  6. Chest CT within past 12 mos.? None noted  Patient does meet eligibility criteria. [facility] scheduling notified to schedule the shared decision making visit.

## 2018-12-10 ENCOUNTER — TELEPHONE (OUTPATIENT)
Dept: HEMATOLOGY ONCOLOGY | Facility: MEDICAL CENTER | Age: 68
End: 2018-12-10

## 2018-12-10 NOTE — TELEPHONE ENCOUNTER
Spoke with patient to schedule LCS, she stated she wasn't aware her Dr was referring her, and that she is too busy at this time, to set up an appt. She would like us to call back in a couple months, to see if she is ready to schedule.

## 2019-01-14 ENCOUNTER — HOSPITAL ENCOUNTER (OUTPATIENT)
Facility: MEDICAL CENTER | Age: 69
End: 2019-01-14
Attending: INTERNAL MEDICINE
Payer: MEDICARE

## 2019-01-14 ENCOUNTER — HOSPITAL ENCOUNTER (OUTPATIENT)
Dept: RADIOLOGY | Facility: MEDICAL CENTER | Age: 69
End: 2019-01-14
Attending: OBSTETRICS & GYNECOLOGY | Admitting: OPHTHALMOLOGY
Payer: MEDICARE

## 2019-01-14 DIAGNOSIS — Z01.811 PRE-OPERATIVE RESPIRATORY EXAMINATION: ICD-10-CM

## 2019-01-14 DIAGNOSIS — Z01.812 PRE-OPERATIVE LABORATORY EXAMINATION: ICD-10-CM

## 2019-01-14 DIAGNOSIS — Z01.810 PRE-OPERATIVE CARDIOVASCULAR EXAMINATION: ICD-10-CM

## 2019-01-14 DIAGNOSIS — N18.30 STAGE 3 CHRONIC KIDNEY DISEASE (HCC): ICD-10-CM

## 2019-01-14 LAB
25(OH)D3 SERPL-MCNC: 9 NG/ML (ref 30–100)
ANION GAP SERPL CALC-SCNC: 10 MMOL/L (ref 0–11.9)
ANION GAP SERPL CALC-SCNC: 10 MMOL/L (ref 0–11.9)
BASOPHILS # BLD AUTO: 0.9 % (ref 0–1.8)
BASOPHILS # BLD: 0.08 K/UL (ref 0–0.12)
BUN SERPL-MCNC: 16 MG/DL (ref 8–22)
BUN SERPL-MCNC: 16 MG/DL (ref 8–22)
CALCIUM SERPL-MCNC: 10 MG/DL (ref 8.5–10.5)
CALCIUM SERPL-MCNC: 10.1 MG/DL (ref 8.5–10.5)
CHLORIDE SERPL-SCNC: 102 MMOL/L (ref 96–112)
CHLORIDE SERPL-SCNC: 102 MMOL/L (ref 96–112)
CO2 SERPL-SCNC: 27 MMOL/L (ref 20–33)
CO2 SERPL-SCNC: 27 MMOL/L (ref 20–33)
CREAT SERPL-MCNC: 1.54 MG/DL (ref 0.5–1.4)
CREAT SERPL-MCNC: 1.6 MG/DL (ref 0.5–1.4)
CREAT UR-MCNC: 177 MG/DL
EOSINOPHIL # BLD AUTO: 0.12 K/UL (ref 0–0.51)
EOSINOPHIL NFR BLD: 1.4 % (ref 0–6.9)
ERYTHROCYTE [DISTWIDTH] IN BLOOD BY AUTOMATED COUNT: 46.5 FL (ref 35.9–50)
ERYTHROCYTE [DISTWIDTH] IN BLOOD BY AUTOMATED COUNT: 46.6 FL (ref 35.9–50)
GLUCOSE SERPL-MCNC: 96 MG/DL (ref 65–99)
GLUCOSE SERPL-MCNC: 96 MG/DL (ref 65–99)
HCT VFR BLD AUTO: 50.1 % (ref 37–47)
HCT VFR BLD AUTO: 50.6 % (ref 37–47)
HGB BLD-MCNC: 15.9 G/DL (ref 12–16)
HGB BLD-MCNC: 16.3 G/DL (ref 12–16)
IMM GRANULOCYTES # BLD AUTO: 0.05 K/UL (ref 0–0.11)
IMM GRANULOCYTES NFR BLD AUTO: 0.6 % (ref 0–0.9)
LYMPHOCYTES # BLD AUTO: 2.32 K/UL (ref 1–4.8)
LYMPHOCYTES NFR BLD: 27.4 % (ref 22–41)
MCH RBC QN AUTO: 30.6 PG (ref 27–33)
MCH RBC QN AUTO: 30.9 PG (ref 27–33)
MCHC RBC AUTO-ENTMCNC: 31.7 G/DL (ref 33.6–35)
MCHC RBC AUTO-ENTMCNC: 32.2 G/DL (ref 33.6–35)
MCV RBC AUTO: 96 FL (ref 81.4–97.8)
MCV RBC AUTO: 96.5 FL (ref 81.4–97.8)
MICROALBUMIN UR-MCNC: 38 MG/DL
MICROALBUMIN/CREAT UR: 215 MG/G (ref 0–30)
MONOCYTES # BLD AUTO: 0.48 K/UL (ref 0–0.85)
MONOCYTES NFR BLD AUTO: 5.7 % (ref 0–13.4)
NEUTROPHILS # BLD AUTO: 5.41 K/UL (ref 2–7.15)
NEUTROPHILS NFR BLD: 64 % (ref 44–72)
NRBC # BLD AUTO: 0 K/UL
NRBC BLD-RTO: 0 /100 WBC
PLATELET # BLD AUTO: 232 K/UL (ref 164–446)
PLATELET # BLD AUTO: 243 K/UL (ref 164–446)
PMV BLD AUTO: 9.8 FL (ref 9–12.9)
PMV BLD AUTO: 9.9 FL (ref 9–12.9)
POTASSIUM SERPL-SCNC: 4.2 MMOL/L (ref 3.6–5.5)
POTASSIUM SERPL-SCNC: 4.3 MMOL/L (ref 3.6–5.5)
PTH-INTACT SERPL-MCNC: 162.1 PG/ML (ref 14–72)
RBC # BLD AUTO: 5.19 M/UL (ref 4.2–5.4)
RBC # BLD AUTO: 5.27 M/UL (ref 4.2–5.4)
SODIUM SERPL-SCNC: 139 MMOL/L (ref 135–145)
SODIUM SERPL-SCNC: 139 MMOL/L (ref 135–145)
WBC # BLD AUTO: 8.3 K/UL (ref 4.8–10.8)
WBC # BLD AUTO: 8.5 K/UL (ref 4.8–10.8)

## 2019-01-14 PROCEDURE — 85025 COMPLETE CBC W/AUTO DIFF WBC: CPT

## 2019-01-14 PROCEDURE — 71045 X-RAY EXAM CHEST 1 VIEW: CPT

## 2019-01-14 PROCEDURE — 93005 ELECTROCARDIOGRAM TRACING: CPT

## 2019-01-14 PROCEDURE — 93010 ELECTROCARDIOGRAM REPORT: CPT | Performed by: INTERNAL MEDICINE

## 2019-01-14 PROCEDURE — 82306 VITAMIN D 25 HYDROXY: CPT

## 2019-01-14 PROCEDURE — 82043 UR ALBUMIN QUANTITATIVE: CPT

## 2019-01-14 PROCEDURE — 80048 BASIC METABOLIC PNL TOTAL CA: CPT

## 2019-01-14 PROCEDURE — 85027 COMPLETE CBC AUTOMATED: CPT

## 2019-01-14 PROCEDURE — 80048 BASIC METABOLIC PNL TOTAL CA: CPT | Mod: 91

## 2019-01-14 PROCEDURE — 83970 ASSAY OF PARATHORMONE: CPT

## 2019-01-14 PROCEDURE — 36415 COLL VENOUS BLD VENIPUNCTURE: CPT

## 2019-01-14 PROCEDURE — 82570 ASSAY OF URINE CREATININE: CPT

## 2019-01-14 RX ORDER — ALBUTEROL SULFATE 90 UG/1
2 AEROSOL, METERED RESPIRATORY (INHALATION) EVERY 6 HOURS PRN
Status: ON HOLD | COMMUNITY
End: 2019-02-07

## 2019-01-14 NOTE — OR NURSING
Lauren here to preadmit for 3 surgeries.  She is on Plavix and Plendil, I instructed her to call Dr Centeno's office for instructions before hysterectomy, but she could take them for cataract surgery.  She uses O2 at home as needed, supplier [employer], but will be changing to [employer] soon. She only has a concentrator.  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 I spoke to Fatuma in same day, and she is aware.

## 2019-01-15 ENCOUNTER — HOSPITAL ENCOUNTER (OUTPATIENT)
Facility: MEDICAL CENTER | Age: 69
End: 2019-01-15
Attending: OPHTHALMOLOGY | Admitting: OPHTHALMOLOGY
Payer: MEDICARE

## 2019-01-15 VITALS
BODY MASS INDEX: 30.15 KG/M2 | SYSTOLIC BLOOD PRESSURE: 107 MMHG | DIASTOLIC BLOOD PRESSURE: 74 MMHG | HEART RATE: 91 BPM | OXYGEN SATURATION: 94 % | HEIGHT: 64 IN | RESPIRATION RATE: 16 BRPM | WEIGHT: 176.59 LBS | TEMPERATURE: 98.1 F

## 2019-01-15 LAB — EKG IMPRESSION: NORMAL

## 2019-01-15 PROCEDURE — 700111 HCHG RX REV CODE 636 W/ 250 OVERRIDE (IP)

## 2019-01-15 PROCEDURE — 700101 HCHG RX REV CODE 250

## 2019-01-15 PROCEDURE — 160048 HCHG OR STATISTICAL LEVEL 1-5: Performed by: OPHTHALMOLOGY

## 2019-01-15 PROCEDURE — 500855 HCHG NEEDLE, IRRIG CYSTOTOME 27G: Performed by: OPHTHALMOLOGY

## 2019-01-15 PROCEDURE — 500791 HCHG KNIFE, SLIT: Performed by: OPHTHALMOLOGY

## 2019-01-15 PROCEDURE — 99153 MOD SED SAME PHYS/QHP EA: CPT | Performed by: OPHTHALMOLOGY

## 2019-01-15 PROCEDURE — 501749 HCHG SHELL REV 276: Performed by: OPHTHALMOLOGY

## 2019-01-15 PROCEDURE — 160046 HCHG PACU - 1ST 60 MINS PHASE II: Performed by: OPHTHALMOLOGY

## 2019-01-15 PROCEDURE — 160002 HCHG RECOVERY MINUTES (STAT): Performed by: OPHTHALMOLOGY

## 2019-01-15 PROCEDURE — 160029 HCHG SURGERY MINUTES - 1ST 30 MINS LEVEL 4: Performed by: OPHTHALMOLOGY

## 2019-01-15 PROCEDURE — 160025 RECOVERY II MINUTES (STATS): Performed by: OPHTHALMOLOGY

## 2019-01-15 PROCEDURE — 99152 MOD SED SAME PHYS/QHP 5/>YRS: CPT | Performed by: OPHTHALMOLOGY

## 2019-01-15 PROCEDURE — 160035 HCHG PACU - 1ST 60 MINS PHASE I: Performed by: OPHTHALMOLOGY

## 2019-01-15 DEVICE — LENS PRE-LOADED TECNIS CLEAR MONO 6.0MM 20D: Type: IMPLANTABLE DEVICE | Site: EYE | Status: FUNCTIONAL

## 2019-01-15 RX ORDER — SODIUM CHLORIDE, SODIUM LACTATE, POTASSIUM CHLORIDE, CALCIUM CHLORIDE 600; 310; 30; 20 MG/100ML; MG/100ML; MG/100ML; MG/100ML
INJECTION, SOLUTION INTRAVENOUS ONCE
Status: COMPLETED | OUTPATIENT
Start: 2019-01-15 | End: 2019-01-15

## 2019-01-15 RX ORDER — MOXIFLOXACIN 5 MG/ML
SOLUTION/ DROPS OPHTHALMIC
Status: COMPLETED
Start: 2019-01-15 | End: 2019-01-15

## 2019-01-15 RX ORDER — TETRACAINE HYDROCHLORIDE 5 MG/ML
SOLUTION OPHTHALMIC
Status: COMPLETED
Start: 2019-01-15 | End: 2019-01-15

## 2019-01-15 RX ORDER — ONDANSETRON 2 MG/ML
4 INJECTION INTRAMUSCULAR; INTRAVENOUS
Status: DISCONTINUED | OUTPATIENT
Start: 2019-01-15 | End: 2019-01-15 | Stop reason: HOSPADM

## 2019-01-15 RX ORDER — KETOROLAC TROMETHAMINE 5 MG/ML
SOLUTION OPHTHALMIC
Status: COMPLETED
Start: 2019-01-15 | End: 2019-01-15

## 2019-01-15 RX ORDER — EPINEPHRINE 1 MG/ML(1)
AMPUL (ML) INJECTION
Status: DISCONTINUED
Start: 2019-01-15 | End: 2019-01-15 | Stop reason: HOSPADM

## 2019-01-15 RX ORDER — SODIUM CHLORIDE, SODIUM LACTATE, POTASSIUM CHLORIDE, CALCIUM CHLORIDE 600; 310; 30; 20 MG/100ML; MG/100ML; MG/100ML; MG/100ML
INJECTION, SOLUTION INTRAVENOUS CONTINUOUS
Status: DISCONTINUED | OUTPATIENT
Start: 2019-01-15 | End: 2019-01-15 | Stop reason: HOSPADM

## 2019-01-15 RX ORDER — TROPICAMIDE 10 MG/ML
SOLUTION/ DROPS OPHTHALMIC
Status: COMPLETED
Start: 2019-01-15 | End: 2019-01-15

## 2019-01-15 RX ORDER — PHENYLEPHRINE HYDROCHLORIDE 25 MG/ML
SOLUTION/ DROPS OPHTHALMIC
Status: COMPLETED
Start: 2019-01-15 | End: 2019-01-15

## 2019-01-15 RX ADMIN — TROPICAMIDE 1 DROP: 10 SOLUTION/ DROPS OPHTHALMIC at 07:48

## 2019-01-15 RX ADMIN — PHENYLEPHRINE HYDROCHLORIDE 1 DROP: 2.5 SOLUTION/ DROPS OPHTHALMIC at 07:48

## 2019-01-15 RX ADMIN — KETOROLAC TROMETHAMINE 1 DROP: 5 SOLUTION OPHTHALMIC at 07:48

## 2019-01-15 RX ADMIN — TETRACAINE HYDROCHLORIDE 1 DROP: 5 SOLUTION OPHTHALMIC at 07:48

## 2019-01-15 RX ADMIN — SODIUM CHLORIDE, SODIUM LACTATE, POTASSIUM CHLORIDE, CALCIUM CHLORIDE 1000 ML: 600; 310; 30; 20 INJECTION, SOLUTION INTRAVENOUS at 07:59

## 2019-01-15 RX ADMIN — MOXIFLOXACIN HYDROCHLORIDE 1 DROP: 5 SOLUTION/ DROPS OPHTHALMIC at 07:48

## 2019-01-15 ASSESSMENT — PAIN SCALES - GENERAL: PAINLEVEL_OUTOF10: 0

## 2019-01-15 NOTE — DISCHARGE INSTRUCTIONS
HOME CARE INSTRUCTIONS FOR CATARACT SURGERY    ACTIVITY: Rest and take it easy for the first 24 hours. We strongly suggest that a responsible adult remain with you during that time. It is normal to feel sleepy. We encourage you to not do anything that requires balance, judgment or coordination. Be extra careful when walking (with a dilated eye, it is easier to trip and fall).     FOR 24 HOURS, DO NOT:       Drive, operate machinery or run household appliances.        Drink beer or alcoholic beverages.        Make important decisions or sign legal documents.     DIET: To avoid nausea, slowly advance diet as tolerated, avoiding spicy or greasy foods for the first meal.     MEDICATIONS: Resume taking daily medication. You may take Tylenol for mild discomfort, if needed.     SURGICAL DRESSING: Eye shield as instructed by your doctor. Dark glasses should be worn while in the sunlight.     Follow your Physician's instruction Sheet. Eye Kit Given    A follow-up appointment is scheduled with your doctor tomorrow at _______ am/pm.     You should call 911 if you develop problems with breathing or chest pain.  You should CALL YOUR PHYSICIAN if you develop: Sharp stabbing pain or sudden change in vision in your operative eye. If you are unable to contact your doctor or the surgical center, you should go to the nearest emergency room or urgent care center. Physician's telephone # __[phone]________________________    If any questions arise, call your doctor. If your doctor is not available, please feel free to call Same Day Surgery at [phone]. You can also call the [employer] Hotline open 24 hours/day, 7 days/week and speak to a nurse at [phone] or [phone].     I acknowledge receipt and understanding of these Home Care Instructions.    ______________________________     _______________________________            Signature of Patient / Responsible Adult                                                       RN  Signature    A registered nurse may call you a few days after your surgery to see how you are doing.   You may also receive a survey in the mail within the next two weeks and we ask that you take a few moments to complete and return the survey. Our goal is to provide you with very good care and we value your comments. Thank you for choosing [facility].

## 2019-01-15 NOTE — OR NURSING
0913 Pt transferred to PACU. Report received from OR RN and anesthesia. Pt is awake and alert. VS stable, respirations even and unlabored. No bleeding, L eye is dilated. No complaints of pain.      0930 Pt tolerating sips of coffee. Waiting for ride.     0945 Pt and friend educated on discharge instructions. Verbalized understanding. All questions answered. All belongings are with patient.

## 2019-01-24 ENCOUNTER — TELEPHONE (OUTPATIENT)
Dept: MEDICAL GROUP | Facility: PHYSICIAN GROUP | Age: 69
End: 2019-01-24

## 2019-01-24 NOTE — TELEPHONE ENCOUNTER
Please advise patient since she is on the plavix for a TIA that occurred a few years ago and has no heart stents, she can stop the plavix 5 before her surgery and restart it a day after her surgery.   Maisha Bay M.D.        1. Caller Name: chapincito                                         Call Back Number:       Patient approves a detailed voicemail message: yes    2.  What is the procedure: hysterectomy    3.  When is the procedure scheduled: 2/7/19    4.  Who is the Surgeon: dr suarez    Pt would like to know if she needs to stop Plavix proir to the surgery. Thanks

## 2019-01-25 NOTE — TELEPHONE ENCOUNTER
Phone Number Called: [phone]    Message: pt notified regarding info beow    Left Message for patient to call back: N\A

## 2019-01-29 ENCOUNTER — HOSPITAL ENCOUNTER (OUTPATIENT)
Facility: MEDICAL CENTER | Age: 69
End: 2019-01-29
Attending: OPHTHALMOLOGY | Admitting: OPHTHALMOLOGY
Payer: MEDICARE

## 2019-01-29 VITALS
DIASTOLIC BLOOD PRESSURE: 81 MMHG | SYSTOLIC BLOOD PRESSURE: 153 MMHG | HEIGHT: 64 IN | BODY MASS INDEX: 29.73 KG/M2 | TEMPERATURE: 97.5 F | RESPIRATION RATE: 16 BRPM | HEART RATE: 74 BPM | WEIGHT: 174.16 LBS | OXYGEN SATURATION: 93 %

## 2019-01-29 PROCEDURE — 700111 HCHG RX REV CODE 636 W/ 250 OVERRIDE (IP)

## 2019-01-29 PROCEDURE — 160029 HCHG SURGERY MINUTES - 1ST 30 MINS LEVEL 4: Performed by: OPHTHALMOLOGY

## 2019-01-29 PROCEDURE — 160035 HCHG PACU - 1ST 60 MINS PHASE I: Performed by: OPHTHALMOLOGY

## 2019-01-29 PROCEDURE — 500791 HCHG KNIFE, SLIT: Performed by: OPHTHALMOLOGY

## 2019-01-29 PROCEDURE — 700101 HCHG RX REV CODE 250

## 2019-01-29 PROCEDURE — 500855 HCHG NEEDLE, IRRIG CYSTOTOME 27G: Performed by: OPHTHALMOLOGY

## 2019-01-29 PROCEDURE — 501749 HCHG SHELL REV 276: Performed by: OPHTHALMOLOGY

## 2019-01-29 PROCEDURE — 160048 HCHG OR STATISTICAL LEVEL 1-5: Performed by: OPHTHALMOLOGY

## 2019-01-29 PROCEDURE — 160025 RECOVERY II MINUTES (STATS): Performed by: OPHTHALMOLOGY

## 2019-01-29 PROCEDURE — 160046 HCHG PACU - 1ST 60 MINS PHASE II: Performed by: OPHTHALMOLOGY

## 2019-01-29 PROCEDURE — 160002 HCHG RECOVERY MINUTES (STAT): Performed by: OPHTHALMOLOGY

## 2019-01-29 PROCEDURE — 99152 MOD SED SAME PHYS/QHP 5/>YRS: CPT | Performed by: OPHTHALMOLOGY

## 2019-01-29 DEVICE — LENS PRE-LOADED TECNIS CLEAR MONO 6.0MM 19D: Type: IMPLANTABLE DEVICE | Site: EYE | Status: FUNCTIONAL

## 2019-01-29 RX ORDER — KETOROLAC TROMETHAMINE 5 MG/ML
SOLUTION OPHTHALMIC
Status: COMPLETED
Start: 2019-01-29 | End: 2019-01-29

## 2019-01-29 RX ORDER — EPINEPHRINE 1 MG/ML(1)
AMPUL (ML) INJECTION
Status: DISCONTINUED
Start: 2019-01-29 | End: 2019-01-29 | Stop reason: HOSPADM

## 2019-01-29 RX ORDER — SODIUM CHLORIDE, SODIUM LACTATE, POTASSIUM CHLORIDE, CALCIUM CHLORIDE 600; 310; 30; 20 MG/100ML; MG/100ML; MG/100ML; MG/100ML
INJECTION, SOLUTION INTRAVENOUS CONTINUOUS
Status: DISCONTINUED | OUTPATIENT
Start: 2019-01-29 | End: 2019-01-29 | Stop reason: HOSPADM

## 2019-01-29 RX ORDER — TETRACAINE HYDROCHLORIDE 5 MG/ML
SOLUTION OPHTHALMIC
Status: DISCONTINUED | OUTPATIENT
Start: 2019-01-29 | End: 2019-01-29 | Stop reason: HOSPADM

## 2019-01-29 RX ORDER — ONDANSETRON 2 MG/ML
4 INJECTION INTRAMUSCULAR; INTRAVENOUS
Status: DISCONTINUED | OUTPATIENT
Start: 2019-01-29 | End: 2019-01-29 | Stop reason: HOSPADM

## 2019-01-29 RX ORDER — MOXIFLOXACIN 5 MG/ML
SOLUTION/ DROPS OPHTHALMIC
Status: DISCONTINUED | OUTPATIENT
Start: 2019-01-29 | End: 2019-01-29 | Stop reason: HOSPADM

## 2019-01-29 RX ORDER — TETRACAINE HYDROCHLORIDE 5 MG/ML
SOLUTION OPHTHALMIC
Status: COMPLETED
Start: 2019-01-29 | End: 2019-01-29

## 2019-01-29 RX ORDER — MOXIFLOXACIN 5 MG/ML
SOLUTION/ DROPS OPHTHALMIC
Status: COMPLETED
Start: 2019-01-29 | End: 2019-01-29

## 2019-01-29 RX ORDER — PHENYLEPHRINE HYDROCHLORIDE 25 MG/ML
SOLUTION/ DROPS OPHTHALMIC
Status: COMPLETED
Start: 2019-01-29 | End: 2019-01-29

## 2019-01-29 RX ORDER — METOPROLOL TARTRATE 1 MG/ML
1 INJECTION, SOLUTION INTRAVENOUS
Status: DISCONTINUED | OUTPATIENT
Start: 2019-01-29 | End: 2019-01-29 | Stop reason: HOSPADM

## 2019-01-29 RX ORDER — SODIUM CHLORIDE, SODIUM LACTATE, POTASSIUM CHLORIDE, CALCIUM CHLORIDE 600; 310; 30; 20 MG/100ML; MG/100ML; MG/100ML; MG/100ML
INJECTION, SOLUTION INTRAVENOUS ONCE
Status: COMPLETED | OUTPATIENT
Start: 2019-01-29 | End: 2019-01-29

## 2019-01-29 RX ORDER — TROPICAMIDE 10 MG/ML
SOLUTION/ DROPS OPHTHALMIC
Status: COMPLETED
Start: 2019-01-29 | End: 2019-01-29

## 2019-01-29 RX ORDER — TETRACAINE HYDROCHLORIDE 5 MG/ML
SOLUTION OPHTHALMIC
Status: DISCONTINUED
Start: 2019-01-29 | End: 2019-01-29 | Stop reason: HOSPADM

## 2019-01-29 RX ADMIN — TETRACAINE HYDROCHLORIDE 1 DROP: 5 SOLUTION OPHTHALMIC at 07:38

## 2019-01-29 RX ADMIN — MOXIFLOXACIN HYDROCHLORIDE 1 DROP: 5 SOLUTION/ DROPS OPHTHALMIC at 07:37

## 2019-01-29 RX ADMIN — SODIUM CHLORIDE, SODIUM LACTATE, POTASSIUM CHLORIDE, CALCIUM CHLORIDE: 600; 310; 30; 20 INJECTION, SOLUTION INTRAVENOUS at 07:34

## 2019-01-29 RX ADMIN — PHENYLEPHRINE HYDROCHLORIDE 1 DROP: 2.5 SOLUTION/ DROPS OPHTHALMIC at 07:38

## 2019-01-29 RX ADMIN — KETOROLAC TROMETHAMINE 1 DROP: 5 SOLUTION OPHTHALMIC at 07:36

## 2019-01-29 RX ADMIN — TROPICAMIDE 1 DROP: 10 SOLUTION/ DROPS OPHTHALMIC at 07:39

## 2019-01-29 NOTE — DISCHARGE INSTRUCTIONS
HOME CARE INSTRUCTIONS FOR CATARACT SURGERY    ACTIVITY: Rest and take it easy for the first 24 hours. We strongly suggest that a responsible adult remain with you during that time. It is normal to feel sleepy. We encourage you to not do anything that requires balance, judgment or coordination. Be extra careful when walking (with a dilated eye, it is easier to trip and fall).     FOR 24 HOURS, DO NOT:       Drive, operate machinery or run household appliances.        Drink beer or alcoholic beverages.        Make important decisions or sign legal documents.     DIET: To avoid nausea, slowly advance diet as tolerated, avoiding spicy or greasy foods for the first meal.     MEDICATIONS: Resume taking daily medication. You may take Tylenol for mild discomfort, if needed.     SURGICAL DRESSING: Eye shield as instructed by your doctor. Dark glasses should be worn while in the sunlight.     Follow your Physician's instruction Sheet. Eye Kit Given    A follow-up appointment is scheduled with your doctor tomorrow at 8:00 am    You should call 911 if you develop problems with breathing or chest pain.  You should CALL YOUR PHYSICIAN if you develop: Sharp stabbing pain or sudden change in vision in your operative eye. If you are unable to contact your doctor or the surgical center, you should go to the nearest emergency room or urgent care center. Physician's telephone # [phone]    If any questions arise, call your doctor. If your doctor is not available, please feel free to call Same Day Surgery at [phone]. You can also call the [employer] Hotline open 24 hours/day, 7 days/week and speak to a nurse at [phone] or [phone].     I acknowledge receipt and understanding of these Home Care Instructions.    ______________________________     _______________________________            Signature of Patient / Responsible Adult                                                       RN Signature    A registered nurse may  call you a few days after your surgery to see how you are doing.   You may also receive a survey in the mail within the next two weeks and we ask that you take a few moments to complete and return the survey. Our goal is to provide you with very good care and we value your comments. Thank you for choosing [facility].

## 2019-01-29 NOTE — OR NURSING
0839  Pt arrived to PACU from OR with Dr. Prado and RN.  Right eye dilated.  Redness observed to non-surgical (left) eye.  Pt states this is not new.    0844  Pt sitting up, tolerating oral fluids well.  She denies pain, nausea.    0848  PIV dc'd  0850  DC instructions discussed with pt.  She meets DC criteria.    0854  Pt DC'd home with .

## 2019-02-01 ENCOUNTER — GYNECOLOGY VISIT (OUTPATIENT)
Dept: OBGYN | Facility: CLINIC | Age: 69
End: 2019-02-01
Payer: MEDICARE

## 2019-02-01 VITALS
HEIGHT: 64 IN | DIASTOLIC BLOOD PRESSURE: 76 MMHG | SYSTOLIC BLOOD PRESSURE: 124 MMHG | WEIGHT: 176 LBS | BODY MASS INDEX: 30.05 KG/M2

## 2019-02-01 DIAGNOSIS — D49.59 OVARIAN NEOPLASM: ICD-10-CM

## 2019-02-01 PROCEDURE — 99214 OFFICE O/P EST MOD 30 MIN: CPT | Performed by: OBSTETRICS & GYNECOLOGY

## 2019-02-01 NOTE — PROGRESS NOTES
69 y.o.  elderly female , with known persistent 6 cm Left ovaran mass; known since  . PAtient was to have surgery and lost to F/U x 1 year !!. Patient then returns , with chronic medical issues , to include CKD, COPD, and HTN. Patient noted on scan to have same cystic mass , and desires removal. Patient previoulsy cleared for surgery , and has as well recently undergone , cataract surgery 2019.   Past Medical History:   Diagnosis Date   • Arthritis     all over, history of gout both feet, great toes   • Asthma     daily inhalers   • Cataract    • CKD (chronic kidney disease) stage 4, GFR 15-29 ml/min ([facility]) 2013    Did see Dr. Perkins; current GFR 27 13; Has both kidneys, only one is functioning.   • COPD    • Dental disorder     lower partial   • Heart burn    • History of anemia    • Hypercholesteremia    • Hypertension    • Indigestion    • Ovarian neoplasm 8/10/2017    Incidental finding of the CT Renal  - 6.8 x 6.8 x 6 cm cystic left adnexal lesion may represent an ovarian cystadenoma or cystadenocarcinoma.     • Pneumonia ?    • Pulmonary emphysema (HCC)     Oxygen as needed, through [employer], will be switching to [employer]   • Stroke (HCC)     no side effects   • Vitamin d deficiency 2013    Was taking 2000 - still 26; incr to 4000     Patient Active Problem List    Diagnosis Date Noted   • Ovarian mass, left 08/10/2017     Priority: High   • Chronic gout of right foot 10/11/2018   • Other emphysema (HCC) 2018   • Colon cancer screening 2018   • TIA (transient ischemic attack) 2018   • Obesity (BMI 30-39.9) 2018   • Stage 3 chronic kidney disease (HCC) 2017   • Lung nodule, multiple 2017   • Chronic respiratory failure with hypoxia (HCC) 2010   • CAD (coronary artery disease) 2010   • HTN (hypertension) 2010       Past Surgical History:   Procedure Laterality Date   • CATARACT PHACO WITH IOL Right 2019    Procedure:  "CATARACT PHACO WITH IOL;  Surgeon: Xander Hemphill M.D.;  Location: SURGERY SAME DAY [facility] ORS;  Service: Ophthalmology   • CATARACT PHACO WITH IOL Left 1/15/2019    Procedure: CATARACT PHACO WITH IOL;  Surgeon: Xander Hemphill M.D.;  Location: SURGERY [facility] DAY [facility];  Service: Ophthalmology   • APPENDECTOMY      years ago, ?2000     Current Outpatient Prescriptions on File Prior to Visit   Medication Sig Dispense Refill   • albuterol 108 (90 Base) MCG/ACT Aero Soln inhalation aerosol Inhale 2 Puffs by mouth every 6 hours as needed for Shortness of Breath.     • calcitRIOL (ROCALTROL) 0.25 MCG Cap Take 1 Cap by mouth every 48 hours. 45 Cap 3   • cyclobenzaprine (FLEXERIL) 10 MG Tab Take 1 Tab by mouth 3 times a day as needed. FOR MILD PAIN. 90 Tab 3   • ipratropium-albuterol (COMBIVENT RESPIMAT)  MCG/ACT Aero Soln Inhale 1 Puff by mouth 4 times a day. 3 Inhaler 3   • clopidogrel (PLAVIX) 75 MG Tab Take 1 Tab by mouth every day. 90 Tab 3   • omeprazole (PRILOSEC) 20 MG delayed-release capsule Take 1 Cap by mouth every day. 90 Cap 3   • felodipine (PLENDIL) 5 MG TABLET SR 24 HR Take 2 Tabs by mouth every day. 270 Tab 1   • buPROPion SR (WELLBUTRIN-SR) 150 MG TABLET SR 12 HR sustained-release tablet Take 1 Tab by mouth every day. 90 Tab 1   • raloxifene (EVISTA) 60 MG Tab Take 1 Tab by mouth every day. 90 Tab 1   • furosemide (LASIX) 20 MG Tab Take 1 Tab by mouth 2 times a day. 180 Tab 1   • rosuvastatin (CRESTOR) 10 MG Tab Take 1 Tab by mouth every evening. 90 Tab 1   • albuterol (PROVENTIL) 2.5mg/3ml Nebu Soln solution for nebulization 3 mL by Nebulization route every four hours as needed for Shortness of Breath. 75 mL 11   • allopurinol (ZYLOPRIM) 100 MG Tab Take 1 Tab by mouth every day. 90 Tab 3     No current facility-administered medications on file prior to visit.        Encounter Vitals  Standard Vitals  Vitals  Blood Pressure : 124/76  Height: 162.6 cm (5' 4\")  Weight: 79.8 kg (176 " "lb)  Encounter Vitals  Blood Pressure : 124/76  Weight: 79.8 kg (176 lb)  Height: 162.6 cm (5' 4\")  BMI (Calculated): 30.21  Pulmonary-Specific Vitals     Durable Medical Equipment-Specific Vitals       Exam :   Lungs : clear , no rales , wheezes   Cor : distant heart sounds   abd : morbidly obese, no acites, non tender , no HSM  Pelvic : , mobile uterus , mass effect , but patient unable to relax to assess for mass mobility     Ass:   Persistent , chronic , 6 cm left ovarian mass   No ascites ,  Mass appears benign , by clinical presentation and length of time , with out change   ( See U/S 2017 and 2018 )   Spent 30 minutes minutes with the patient ; Face to Face, with >50% of this time spent in counseling and coordination of care, surrounding the above mentioned issues as well as:   P.   Discuss Surgery  WEST/BSO , discuss type of abdominal incision   Explained that we will reserve that decision for EUA : patient understands and agrees   Questions and complications discussed       Complete discussion regarding the proposed procedure was conducted both today and throughout the entire oclton-operative period. The procedure: total abdominal hysterectomy, bilateral salpingectomy, bilateral ovarian cystectomy  Was specifically addressed. There is increased risk from  any surgical procedure related to  Anesthesia, increased risk of infection, both abdominal and wound infections as well as heavy bleeding, both during surgery, or delayed bleeding. These were discussed as well.                        _______________  Specific to Abdominal surgery ( open), patient is made aware of risk of injury to the bowel, bladder, nerves and the ureter ( urine conduit). Complications to these organs are rare, but do occur and indeed it was discussed that the specific pathology of the patient that necessitated surgery may make the risk greater. Patient additionally is aware of the risk of subsequent adhesions or small bowel obstruction from " open abdominal surgery.      Specific to BSO: patient made aware that since most ovarian cyst formation post hysterectomy is related to adhesions and inflammatory reaction, that subsequent adhesions and recurrence of pain and dyspareunia may occur.   Patient also aware of increased risk of injury to bowel and bladder is present as result of adhesions and every effort including preoperative bowel prep will be used to minimize this risk                                                                                                             ________________  Specific to Hysterectomy, patient is aware that although alternative methods exist for uterine preservation, that both patient and surgeon, agree that the benefits to hysterectomy outweigh the risks and that alternative methods are not indicated or beneficial for her specific pathology ( problem) Patient is also aware that removal of the ovaries may if not already in menopause, speed the onset of menopause, and that specific discussions regarding the risk/benefit of ovarian preservation vs removal and the use of hormone replacement have been discussed and all questions answered.                                                                                                               ________________

## 2019-02-05 NOTE — H&P
Obstetrics & Gynecology History & Physical Note    Date of Service  2019    Chief Complaint  Ovarian Neoplasm     History of Presenting Illness  Lauren Bowden is a 69 y.o.  menopausal female , initially noted to have large ovarian mass in . Patient was to have surgery, but lost to follow up . Patient returns with mass, essentially unchanged , and desires removal . Patient with medical problems , to include HTN and CKD. She has however been cleared for this surgery, as well as 2 in  for Cataracts  .. No LMP recorded. Patient is postmenopausal. She is being admitted for Xlap, WEST/BSO     Gyn  care is at [address]  and is complicated by:  1. Persistent pelvic mass     Patient Active Problem List    Diagnosis Date Noted   • Ovarian mass, left 08/10/2017     Priority: High   • Chronic gout of right foot 10/11/2018   • Other emphysema (HCC) 2018   • Colon cancer screening 2018   • TIA (transient ischemic attack) 2018   • Obesity (BMI 30-39.9) 2018   • Stage 3 chronic kidney disease (HCC) 2017   • Lung nodule, multiple 2017   • Chronic respiratory failure with hypoxia (HCC) 2010   • CAD (coronary artery disease) 2010   • HTN (hypertension) 2010       Obstetric History  OB History    Para Term  AB Living   2         1   SAB TAB Ectopic Molar Multiple Live Births             2      # Outcome Date GA Lbr Travon/2nd Weight Sex Delivery Anes PTL Lv   2       Vag-Spont  N ISAC   1       Vag-Spont   DEC          Gynecologic History  none    Review of Systems  ROS    Medical History   has a past medical history of Arthritis; Asthma; Cataract; CKD (chronic kidney disease) stage 4, GFR 15-29 ml/min ([facility]) (2013); COPD; Dental disorder; Heart burn; History of anemia; Hypercholesteremia; Hypertension; Indigestion; Ovarian neoplasm (8/10/2017); Pneumonia (? ); Pulmonary emphysema (HCC); Stroke ([facility]) (); and Vitamin d  deficiency (7/1/2013).    Surgical History   has a past surgical history that includes appendectomy; cataract phaco with iol (Left, 1/15/2019); and cataract phaco with iol (Right, 1/29/2019).     Family History  family history includes Cancer in her sister; Diabetes in her sister.     Social History   reports that she quit smoking about 8 years ago. Her smoking use included Cigarettes. She has a 40.00 pack-year smoking history. She has never used smokeless tobacco. She reports that she drinks alcohol. She reports that she does not use drugs.    Allergies  No Known Allergies    Medications  Cannot display prior to admission medications because the patient has not been admitted in this contact.     No current facility-administered medications on file prior to encounter.      Current Outpatient Prescriptions on File Prior to Encounter   Medication Sig Dispense Refill   • albuterol 108 (90 Base) MCG/ACT Aero Soln inhalation aerosol Inhale 2 Puffs by mouth every 6 hours as needed for Shortness of Breath.     • calcitRIOL (ROCALTROL) 0.25 MCG Cap Take 1 Cap by mouth every 48 hours. 45 Cap 3   • cyclobenzaprine (FLEXERIL) 10 MG Tab Take 1 Tab by mouth 3 times a day as needed. FOR MILD PAIN. 90 Tab 3   • ipratropium-albuterol (COMBIVENT RESPIMAT)  MCG/ACT Aero Soln Inhale 1 Puff by mouth 4 times a day. 3 Inhaler 3   • clopidogrel (PLAVIX) 75 MG Tab Take 1 Tab by mouth every day. 90 Tab 3   • omeprazole (PRILOSEC) 20 MG delayed-release capsule Take 1 Cap by mouth every day. 90 Cap 3   • felodipine (PLENDIL) 5 MG TABLET SR 24 HR Take 2 Tabs by mouth every day. 270 Tab 1   • buPROPion SR (WELLBUTRIN-SR) 150 MG TABLET SR 12 HR sustained-release tablet Take 1 Tab by mouth every day. 90 Tab 1   • raloxifene (EVISTA) 60 MG Tab Take 1 Tab by mouth every day. 90 Tab 1   • furosemide (LASIX) 20 MG Tab Take 1 Tab by mouth 2 times a day. 180 Tab 1   • rosuvastatin (CRESTOR) 10 MG Tab Take 1 Tab by mouth every evening. 90 Tab 1  "  • albuterol (PROVENTIL) 2.5mg/3ml Nebu Soln solution for nebulization 3 mL by Nebulization route every four hours as needed for Shortness of Breath. 75 mL 11   • allopurinol (ZYLOPRIM) 100 MG Tab Take 1 Tab by mouth every day. 90 Tab 3         Physical Exam    11/07/18 1108   BP: 128/74   BP Location: Right arm   Patient Position: Sitting   Weight: 80.3 kg (177 lb)   Height: 1.626 m (5' 4\")         General:   alert, cooperative, no distress   Skin:   normal   HEENT:  PERRLA, EOMI, Sclera clear, anicteric and extraocular movements intact   Lungs:   no whheezes, rales , clear to auscultation bilaterally   Heart:   regular rate and rhythm   Breasts:   normal without suspicious masses, skin or nipple changes or axillary nodes   Abdomen:  positive findings:  obese, large abdominal wall, no specific masses , no ascites , no HSM   Pelvis: External genitalia: normal general appearance and cervix atrophic , vagina atrophic changes . Mobile small uterus , unable to fully delinate mass , patient not relaxed enough    Labs :   Results for BEVERLY CAMPOS (MRN [id number]) as of 2/5/2019 12:56   Ref. Range 1/14/2019 10:31   WBC Latest Ref Range: 4.8 - 10.8 K/uL 8.3   RBC Latest Ref Range: 4.20 - 5.40 M/uL 5.19   Hemoglobin Latest Ref Range: 12.0 - 16.0 g/dL 15.9   Hematocrit Latest Ref Range: 37.0 - 47.0 % 50.1 (H)   MCV Latest Ref Range: 81.4 - 97.8 fL 96.5   MCH Latest Ref Range: 27.0 - 33.0 pg 30.6   MCHC Latest Ref Range: 33.6 - 35.0 g/dL 31.7 (L)   RDW Latest Ref Range: 35.9 - 50.0 fL 46.5   Platelet Count Latest Ref Range: 164 - 446 K/uL 232   MPV Latest Ref Range: 9.0 - 12.9 fL 9.8   Results for BEVERLY CAMPOS (MRN [id number]) as of 2/5/2019 12:56   Ref. Range 1/14/2019 10:31   Sodium Latest Ref Range: 135 - 145 mmol/L 139   Potassium Latest Ref Range: 3.6 - 5.5 mmol/L 4.2   Chloride Latest Ref Range: 96 - 112 mmol/L 102   Co2 Latest Ref Range: 20 - 33 mmol/L 27   Anion Gap Latest Ref Range: 0.0 - 11.9  10.0   Glucose " Latest Ref Range: 65 - 99 mg/dL 96   Bun Latest Ref Range: 8 - 22 mg/dL 16   Creatinine Latest Ref Range: 0.50 - 1.40 mg/dL 1.60 (H)   GFR If  Latest Ref Range: >60 mL/min/1.73 m 2 39 (A)   GFR If Non  Latest Ref Range: >60 mL/min/1.73 m 2 32 (A)   Calcium Latest Ref Range: 8.5 - 10.5 mg/dL 10.1     Urinalysis:    No results found     Imaging:    10/17/2018 12:55 PM    HISTORY/REASON FOR EXAM:  Left ovarian mass      TECHNIQUE/EXAM DESCRIPTION:  Transabdominal and transvaginal pelvic ultrasound.    COMPARISON:   None    FINDINGS:  Both transabdominal and transvaginal scanning were performed to optimally visualize the pelvis.    The uterus measures 2.53 cm x 5.04 cm x 4.22 cm.  The uterine myometrium is heterogeneous.  The endometrial echo complex measures 0.97 cm.    The right ovary is not visualized.    An irregular cystic mass in the left adnexa measures approximately 6.6 x 5 x 6 cm. No normal ovarian tissue is identified. There is blood flow in the wall of the cyst.    There is no free fluid seen.   Impression       1.  No significant interval change in irregular cystic mass in the left adnexa. No solid nodularity or thickened septations appreciated.    2.  Nonvisualization of the right ovary.   Reading Provider Reading Date   Drea Blood M.D. Oct 17, 2018   Signing Provider Signing Date Signing Time   Drea Blood M.D. Oct 17, 2018  2:18 PM           Assessment:  69 y.o.  Unknown who presents with  CYST OF LEFT OVARY  CKD, with borderline Creatinine   HTN   COPD       Plan:  No new Assessment & Plan notes have been filed under this hospital service since the last note was generated.  Service: Obstetrics & Gynecology    1. Admit [facility]   2. EUA, then XLAP, WEST BSO   3. Will need fluid control and pulmonary support postop   VTE prophylaxis: SCD's, postop Lovenox         Henrry Centeno M.D.

## 2019-02-07 ENCOUNTER — HOSPITAL ENCOUNTER (INPATIENT)
Facility: MEDICAL CENTER | Age: 69
LOS: 1 days | DRG: 742 | End: 2019-02-08
Attending: OBSTETRICS & GYNECOLOGY | Admitting: OBSTETRICS & GYNECOLOGY
Payer: MEDICARE

## 2019-02-07 DIAGNOSIS — Z98.890 POSTOPERATIVE STATE: ICD-10-CM

## 2019-02-07 PROCEDURE — 160009 HCHG ANES TIME/MIN: Performed by: OBSTETRICS & GYNECOLOGY

## 2019-02-07 PROCEDURE — A6404 STERILE GAUZE > 48 SQ IN: HCPCS | Performed by: OBSTETRICS & GYNECOLOGY

## 2019-02-07 PROCEDURE — 160029 HCHG SURGERY MINUTES - 1ST 30 MINS LEVEL 4: Performed by: OBSTETRICS & GYNECOLOGY

## 2019-02-07 PROCEDURE — 160041 HCHG SURGERY MINUTES - EA ADDL 1 MIN LEVEL 4: Performed by: OBSTETRICS & GYNECOLOGY

## 2019-02-07 PROCEDURE — 700111 HCHG RX REV CODE 636 W/ 250 OVERRIDE (IP)

## 2019-02-07 PROCEDURE — 501838 HCHG SUTURE GENERAL: Performed by: OBSTETRICS & GYNECOLOGY

## 2019-02-07 PROCEDURE — 58150 TOTAL HYSTERECTOMY: CPT | Mod: 80 | Performed by: OBSTETRICS & GYNECOLOGY

## 2019-02-07 PROCEDURE — 160036 HCHG PACU - EA ADDL 30 MINS PHASE I: Performed by: OBSTETRICS & GYNECOLOGY

## 2019-02-07 PROCEDURE — A9270 NON-COVERED ITEM OR SERVICE: HCPCS | Performed by: OBSTETRICS & GYNECOLOGY

## 2019-02-07 PROCEDURE — 501445 HCHG STAPLER, SKIN DISP: Performed by: OBSTETRICS & GYNECOLOGY

## 2019-02-07 PROCEDURE — 160048 HCHG OR STATISTICAL LEVEL 1-5: Performed by: OBSTETRICS & GYNECOLOGY

## 2019-02-07 PROCEDURE — A9270 NON-COVERED ITEM OR SERVICE: HCPCS

## 2019-02-07 PROCEDURE — 99223 1ST HOSP IP/OBS HIGH 75: CPT | Performed by: INTERNAL MEDICINE

## 2019-02-07 PROCEDURE — 700102 HCHG RX REV CODE 250 W/ 637 OVERRIDE(OP): Performed by: OBSTETRICS & GYNECOLOGY

## 2019-02-07 PROCEDURE — 700101 HCHG RX REV CODE 250: Performed by: ANESTHESIOLOGY

## 2019-02-07 PROCEDURE — 0UT20ZZ RESECTION OF BILATERAL OVARIES, OPEN APPROACH: ICD-10-PCS | Performed by: OBSTETRICS & GYNECOLOGY

## 2019-02-07 PROCEDURE — 160002 HCHG RECOVERY MINUTES (STAT): Performed by: OBSTETRICS & GYNECOLOGY

## 2019-02-07 PROCEDURE — 700102 HCHG RX REV CODE 250 W/ 637 OVERRIDE(OP): Performed by: ANESTHESIOLOGY

## 2019-02-07 PROCEDURE — 700111 HCHG RX REV CODE 636 W/ 250 OVERRIDE (IP): Performed by: OBSTETRICS & GYNECOLOGY

## 2019-02-07 PROCEDURE — 770006 HCHG ROOM/CARE - MED/SURG/GYN SEMI*

## 2019-02-07 PROCEDURE — 160035 HCHG PACU - 1ST 60 MINS PHASE I: Performed by: OBSTETRICS & GYNECOLOGY

## 2019-02-07 PROCEDURE — 88112 CYTOPATH CELL ENHANCE TECH: CPT

## 2019-02-07 PROCEDURE — 0UT70ZZ RESECTION OF BILATERAL FALLOPIAN TUBES, OPEN APPROACH: ICD-10-PCS | Performed by: OBSTETRICS & GYNECOLOGY

## 2019-02-07 PROCEDURE — 88307 TISSUE EXAM BY PATHOLOGIST: CPT

## 2019-02-07 PROCEDURE — 88304 TISSUE EXAM BY PATHOLOGIST: CPT

## 2019-02-07 PROCEDURE — 700101 HCHG RX REV CODE 250

## 2019-02-07 PROCEDURE — 700102 HCHG RX REV CODE 250 W/ 637 OVERRIDE(OP)

## 2019-02-07 PROCEDURE — 58150 TOTAL HYSTERECTOMY: CPT | Performed by: OBSTETRICS & GYNECOLOGY

## 2019-02-07 PROCEDURE — A6402 STERILE GAUZE <= 16 SQ IN: HCPCS | Performed by: OBSTETRICS & GYNECOLOGY

## 2019-02-07 PROCEDURE — 0UT90ZZ RESECTION OF UTERUS, OPEN APPROACH: ICD-10-PCS | Performed by: OBSTETRICS & GYNECOLOGY

## 2019-02-07 PROCEDURE — 503051 HCHG CLOSURE PRINEO SKIN: Performed by: OBSTETRICS & GYNECOLOGY

## 2019-02-07 PROCEDURE — 88305 TISSUE EXAM BY PATHOLOGIST: CPT

## 2019-02-07 PROCEDURE — A9270 NON-COVERED ITEM OR SERVICE: HCPCS | Performed by: ANESTHESIOLOGY

## 2019-02-07 RX ORDER — MORPHINE SULFATE 4 MG/ML
2 INJECTION, SOLUTION INTRAMUSCULAR; INTRAVENOUS
Status: CANCELLED | OUTPATIENT
Start: 2019-02-07

## 2019-02-07 RX ORDER — ENEMA 19; 7 G/133ML; G/133ML
1 ENEMA RECTAL
Status: CANCELLED | OUTPATIENT
Start: 2019-02-07

## 2019-02-07 RX ORDER — DEXAMETHASONE SODIUM PHOSPHATE 4 MG/ML
4 INJECTION, SOLUTION INTRA-ARTICULAR; INTRALESIONAL; INTRAMUSCULAR; INTRAVENOUS; SOFT TISSUE
Status: DISCONTINUED | OUTPATIENT
Start: 2019-02-07 | End: 2019-12-05 | Stop reason: HOSPADM

## 2019-02-07 RX ORDER — CALCITRIOL 0.25 UG/1
0.25 CAPSULE, LIQUID FILLED ORAL
Status: DISCONTINUED | OUTPATIENT
Start: 2019-02-08 | End: 2019-02-08 | Stop reason: HOSPADM

## 2019-02-07 RX ORDER — SODIUM CHLORIDE, SODIUM LACTATE, POTASSIUM CHLORIDE, CALCIUM CHLORIDE 600; 310; 30; 20 MG/100ML; MG/100ML; MG/100ML; MG/100ML
INJECTION, SOLUTION INTRAVENOUS EVERY 6 HOURS
Status: ACTIVE | OUTPATIENT
Start: 2019-02-07 | End: 2019-02-08

## 2019-02-07 RX ORDER — ROSUVASTATIN CALCIUM 10 MG/1
10 TABLET, COATED ORAL EVERY EVENING
Status: DISCONTINUED | OUTPATIENT
Start: 2019-02-07 | End: 2019-02-07

## 2019-02-07 RX ORDER — FELODIPINE 5 MG/1
5 TABLET, EXTENDED RELEASE ORAL
Status: DISCONTINUED | OUTPATIENT
Start: 2019-02-08 | End: 2019-02-08 | Stop reason: HOSPADM

## 2019-02-07 RX ORDER — ONDANSETRON 2 MG/ML
4 INJECTION INTRAMUSCULAR; INTRAVENOUS EVERY 4 HOURS PRN
Status: CANCELLED | OUTPATIENT
Start: 2019-02-07

## 2019-02-07 RX ORDER — FUROSEMIDE 10 MG/ML
20 INJECTION INTRAMUSCULAR; INTRAVENOUS ONCE
Status: COMPLETED | OUTPATIENT
Start: 2019-02-07 | End: 2019-02-07

## 2019-02-07 RX ORDER — DIPHENHYDRAMINE HYDROCHLORIDE 50 MG/ML
12.5 INJECTION INTRAMUSCULAR; INTRAVENOUS
Status: DISCONTINUED | OUTPATIENT
Start: 2019-02-07 | End: 2019-02-07 | Stop reason: HOSPADM

## 2019-02-07 RX ORDER — ENEMA 19; 7 G/133ML; G/133ML
1 ENEMA RECTAL
Status: DISCONTINUED | OUTPATIENT
Start: 2019-02-07 | End: 2019-12-05 | Stop reason: HOSPADM

## 2019-02-07 RX ORDER — DEXAMETHASONE SODIUM PHOSPHATE 4 MG/ML
4 INJECTION, SOLUTION INTRA-ARTICULAR; INTRALESIONAL; INTRAMUSCULAR; INTRAVENOUS; SOFT TISSUE
Status: DISCONTINUED | OUTPATIENT
Start: 2019-02-07 | End: 2019-02-08 | Stop reason: HOSPADM

## 2019-02-07 RX ORDER — GABAPENTIN 300 MG/1
300 CAPSULE ORAL ONCE
Status: COMPLETED | OUTPATIENT
Start: 2019-02-07 | End: 2019-02-07

## 2019-02-07 RX ORDER — OXYCODONE HYDROCHLORIDE 5 MG/1
5 TABLET ORAL
Status: DISCONTINUED | OUTPATIENT
Start: 2019-02-07 | End: 2019-12-05 | Stop reason: HOSPADM

## 2019-02-07 RX ORDER — SCOLOPAMINE TRANSDERMAL SYSTEM 1 MG/1
1 PATCH, EXTENDED RELEASE TRANSDERMAL
Status: DISCONTINUED | OUTPATIENT
Start: 2019-02-07 | End: 2019-12-05 | Stop reason: HOSPADM

## 2019-02-07 RX ORDER — AMOXICILLIN 250 MG
2 CAPSULE ORAL 2 TIMES DAILY
Status: DISCONTINUED | OUTPATIENT
Start: 2019-02-07 | End: 2019-02-07

## 2019-02-07 RX ORDER — ONDANSETRON 2 MG/ML
4 INJECTION INTRAMUSCULAR; INTRAVENOUS EVERY 4 HOURS PRN
Status: DISCONTINUED | OUTPATIENT
Start: 2019-02-07 | End: 2019-12-05 | Stop reason: HOSPADM

## 2019-02-07 RX ORDER — ACETAMINOPHEN 500 MG
TABLET ORAL
Status: DISCONTINUED
Start: 2019-02-07 | End: 2019-02-07

## 2019-02-07 RX ORDER — DIPHENHYDRAMINE HYDROCHLORIDE 50 MG/ML
25 INJECTION INTRAMUSCULAR; INTRAVENOUS EVERY 6 HOURS PRN
Status: DISCONTINUED | OUTPATIENT
Start: 2019-02-07 | End: 2019-12-05 | Stop reason: HOSPADM

## 2019-02-07 RX ORDER — ROSUVASTATIN CALCIUM 10 MG/1
10 TABLET, COATED ORAL EVERY EVENING
Status: DISCONTINUED | OUTPATIENT
Start: 2019-02-08 | End: 2019-02-08 | Stop reason: HOSPADM

## 2019-02-07 RX ORDER — ALBUTEROL SULFATE 90 UG/1
2 AEROSOL, METERED RESPIRATORY (INHALATION) EVERY 4 HOURS PRN
Status: DISCONTINUED | OUTPATIENT
Start: 2019-02-07 | End: 2019-02-08 | Stop reason: HOSPADM

## 2019-02-07 RX ORDER — ACETAMINOPHEN 500 MG
1000 TABLET ORAL EVERY 6 HOURS
Status: ACTIVE | OUTPATIENT
Start: 2019-02-08 | End: 2019-02-12

## 2019-02-07 RX ORDER — ACETAMINOPHEN 500 MG
1000 TABLET ORAL EVERY 6 HOURS
Status: CANCELLED | OUTPATIENT
Start: 2019-02-07 | End: 2019-02-12

## 2019-02-07 RX ORDER — OXYCODONE HCL 5 MG/5 ML
5 SOLUTION, ORAL ORAL
Status: DISCONTINUED | OUTPATIENT
Start: 2019-02-07 | End: 2019-02-07 | Stop reason: HOSPADM

## 2019-02-07 RX ORDER — HALOPERIDOL 5 MG/ML
1 INJECTION INTRAMUSCULAR EVERY 6 HOURS PRN
Status: DISCONTINUED | OUTPATIENT
Start: 2019-02-07 | End: 2019-12-05 | Stop reason: HOSPADM

## 2019-02-07 RX ORDER — BISACODYL 10 MG
10 SUPPOSITORY, RECTAL RECTAL
Status: DISCONTINUED | OUTPATIENT
Start: 2019-02-07 | End: 2019-02-08 | Stop reason: HOSPADM

## 2019-02-07 RX ORDER — OXYCODONE HCL 5 MG/5 ML
10 SOLUTION, ORAL ORAL
Status: DISCONTINUED | OUTPATIENT
Start: 2019-02-07 | End: 2019-02-07 | Stop reason: HOSPADM

## 2019-02-07 RX ORDER — BISACODYL 10 MG
10 SUPPOSITORY, RECTAL RECTAL
Status: CANCELLED | OUTPATIENT
Start: 2019-02-07

## 2019-02-07 RX ORDER — ACETAMINOPHEN 500 MG
1000 TABLET ORAL ONCE
Status: COMPLETED | OUTPATIENT
Start: 2019-02-07 | End: 2019-02-07

## 2019-02-07 RX ORDER — ALLOPURINOL 100 MG/1
100 TABLET ORAL DAILY
Status: DISCONTINUED | OUTPATIENT
Start: 2019-02-08 | End: 2019-02-08 | Stop reason: HOSPADM

## 2019-02-07 RX ORDER — AMOXICILLIN 250 MG
1 CAPSULE ORAL
Status: CANCELLED | OUTPATIENT
Start: 2019-02-07

## 2019-02-07 RX ORDER — HALOPERIDOL 5 MG/ML
1 INJECTION INTRAMUSCULAR EVERY 6 HOURS PRN
Status: CANCELLED | OUTPATIENT
Start: 2019-02-07

## 2019-02-07 RX ORDER — ONDANSETRON 2 MG/ML
4 INJECTION INTRAMUSCULAR; INTRAVENOUS EVERY 4 HOURS PRN
Status: DISCONTINUED | OUTPATIENT
Start: 2019-02-07 | End: 2019-02-08 | Stop reason: HOSPADM

## 2019-02-07 RX ORDER — OXYCODONE HYDROCHLORIDE 5 MG/1
5 TABLET ORAL
Status: CANCELLED | OUTPATIENT
Start: 2019-02-07

## 2019-02-07 RX ORDER — DIPHENHYDRAMINE HYDROCHLORIDE 50 MG/ML
25 INJECTION INTRAMUSCULAR; INTRAVENOUS EVERY 6 HOURS PRN
Status: CANCELLED | OUTPATIENT
Start: 2019-02-07

## 2019-02-07 RX ORDER — AMOXICILLIN 250 MG
1 CAPSULE ORAL
Status: DISCONTINUED | OUTPATIENT
Start: 2019-02-07 | End: 2019-12-05 | Stop reason: HOSPADM

## 2019-02-07 RX ORDER — SCOLOPAMINE TRANSDERMAL SYSTEM 1 MG/1
1 PATCH, EXTENDED RELEASE TRANSDERMAL
Status: CANCELLED | OUTPATIENT
Start: 2019-02-07

## 2019-02-07 RX ORDER — HYDROMORPHONE HYDROCHLORIDE 1 MG/ML
0.1 INJECTION, SOLUTION INTRAMUSCULAR; INTRAVENOUS; SUBCUTANEOUS
Status: DISCONTINUED | OUTPATIENT
Start: 2019-02-07 | End: 2019-02-07 | Stop reason: HOSPADM

## 2019-02-07 RX ORDER — DOCUSATE SODIUM 100 MG/1
100 CAPSULE, LIQUID FILLED ORAL 2 TIMES DAILY
Status: DISCONTINUED | OUTPATIENT
Start: 2019-02-07 | End: 2019-12-05 | Stop reason: HOSPADM

## 2019-02-07 RX ORDER — ALBUTEROL SULFATE 90 UG/1
2 AEROSOL, METERED RESPIRATORY (INHALATION) 4 TIMES DAILY
Status: DISCONTINUED | OUTPATIENT
Start: 2019-02-08 | End: 2019-02-08

## 2019-02-07 RX ORDER — AMOXICILLIN 250 MG
1 CAPSULE ORAL NIGHTLY
Status: DISCONTINUED | OUTPATIENT
Start: 2019-02-07 | End: 2019-12-05 | Stop reason: HOSPADM

## 2019-02-07 RX ORDER — OXYCODONE HYDROCHLORIDE 5 MG/1
2.5 TABLET ORAL
Status: DISCONTINUED | OUTPATIENT
Start: 2019-02-07 | End: 2019-12-05 | Stop reason: HOSPADM

## 2019-02-07 RX ORDER — MORPHINE SULFATE 4 MG/ML
2 INJECTION, SOLUTION INTRAMUSCULAR; INTRAVENOUS
Status: DISCONTINUED | OUTPATIENT
Start: 2019-02-07 | End: 2019-12-05 | Stop reason: HOSPADM

## 2019-02-07 RX ORDER — TIOTROPIUM BROMIDE 18 UG/1
1 CAPSULE ORAL; RESPIRATORY (INHALATION) DAILY
Status: DISCONTINUED | OUTPATIENT
Start: 2019-02-08 | End: 2019-02-08

## 2019-02-07 RX ORDER — DEXAMETHASONE SODIUM PHOSPHATE 4 MG/ML
4 INJECTION, SOLUTION INTRA-ARTICULAR; INTRALESIONAL; INTRAMUSCULAR; INTRAVENOUS; SOFT TISSUE
Status: CANCELLED | OUTPATIENT
Start: 2019-02-07

## 2019-02-07 RX ORDER — MEPERIDINE HYDROCHLORIDE 25 MG/ML
12.5 INJECTION INTRAMUSCULAR; INTRAVENOUS; SUBCUTANEOUS
Status: DISCONTINUED | OUTPATIENT
Start: 2019-02-07 | End: 2019-02-07 | Stop reason: HOSPADM

## 2019-02-07 RX ORDER — AMOXICILLIN 250 MG
1 CAPSULE ORAL NIGHTLY
Status: CANCELLED | OUTPATIENT
Start: 2019-02-07

## 2019-02-07 RX ORDER — HALOPERIDOL 5 MG/ML
1 INJECTION INTRAMUSCULAR
Status: DISCONTINUED | OUTPATIENT
Start: 2019-02-07 | End: 2019-02-07 | Stop reason: HOSPADM

## 2019-02-07 RX ORDER — BUPROPION HYDROCHLORIDE 150 MG/1
150 TABLET, EXTENDED RELEASE ORAL
Status: DISCONTINUED | OUTPATIENT
Start: 2019-02-07 | End: 2019-02-07

## 2019-02-07 RX ORDER — HYDROMORPHONE HYDROCHLORIDE 1 MG/ML
0.4 INJECTION, SOLUTION INTRAMUSCULAR; INTRAVENOUS; SUBCUTANEOUS
Status: DISCONTINUED | OUTPATIENT
Start: 2019-02-07 | End: 2019-02-07 | Stop reason: HOSPADM

## 2019-02-07 RX ORDER — AMOXICILLIN 250 MG
1 CAPSULE ORAL
Status: DISCONTINUED | OUTPATIENT
Start: 2019-02-07 | End: 2019-02-08 | Stop reason: HOSPADM

## 2019-02-07 RX ORDER — ACETAMINOPHEN 500 MG
1000 TABLET ORAL EVERY 6 HOURS
Status: DISCONTINUED | OUTPATIENT
Start: 2019-02-08 | End: 2019-02-08 | Stop reason: HOSPADM

## 2019-02-07 RX ORDER — OXYCODONE HYDROCHLORIDE 5 MG/1
2.5 TABLET ORAL
Status: CANCELLED | OUTPATIENT
Start: 2019-02-07

## 2019-02-07 RX ORDER — POLYETHYLENE GLYCOL 3350 17 G/17G
1 POWDER, FOR SOLUTION ORAL
Status: DISCONTINUED | OUTPATIENT
Start: 2019-02-07 | End: 2019-02-07

## 2019-02-07 RX ORDER — OXYCODONE HYDROCHLORIDE 5 MG/1
5 TABLET ORAL
Status: DISCONTINUED | OUTPATIENT
Start: 2019-02-07 | End: 2019-02-08 | Stop reason: HOSPADM

## 2019-02-07 RX ORDER — DOCUSATE SODIUM 100 MG/1
100 CAPSULE, LIQUID FILLED ORAL 2 TIMES DAILY
Status: DISCONTINUED | OUTPATIENT
Start: 2019-02-07 | End: 2019-02-08 | Stop reason: HOSPADM

## 2019-02-07 RX ORDER — BUPROPION HYDROCHLORIDE 150 MG/1
150 TABLET, EXTENDED RELEASE ORAL DAILY
Status: DISCONTINUED | OUTPATIENT
Start: 2019-02-08 | End: 2019-02-08 | Stop reason: HOSPADM

## 2019-02-07 RX ORDER — MORPHINE SULFATE 4 MG/ML
2 INJECTION, SOLUTION INTRAMUSCULAR; INTRAVENOUS
Status: DISCONTINUED | OUTPATIENT
Start: 2019-02-07 | End: 2019-02-08 | Stop reason: HOSPADM

## 2019-02-07 RX ORDER — DIPHENHYDRAMINE HYDROCHLORIDE 50 MG/ML
25 INJECTION INTRAMUSCULAR; INTRAVENOUS EVERY 6 HOURS PRN
Status: DISCONTINUED | OUTPATIENT
Start: 2019-02-07 | End: 2019-02-08 | Stop reason: HOSPADM

## 2019-02-07 RX ORDER — BISACODYL 10 MG
10 SUPPOSITORY, RECTAL RECTAL
Status: DISCONTINUED | OUTPATIENT
Start: 2019-02-07 | End: 2019-12-05 | Stop reason: HOSPADM

## 2019-02-07 RX ORDER — SCOLOPAMINE TRANSDERMAL SYSTEM 1 MG/1
1 PATCH, EXTENDED RELEASE TRANSDERMAL
Status: DISCONTINUED | OUTPATIENT
Start: 2019-02-07 | End: 2019-02-08 | Stop reason: HOSPADM

## 2019-02-07 RX ORDER — BISACODYL 10 MG
10 SUPPOSITORY, RECTAL RECTAL
Status: DISCONTINUED | OUTPATIENT
Start: 2019-02-07 | End: 2019-02-07

## 2019-02-07 RX ORDER — DOCUSATE SODIUM 100 MG/1
100 CAPSULE, LIQUID FILLED ORAL 2 TIMES DAILY
Status: CANCELLED | OUTPATIENT
Start: 2019-02-07

## 2019-02-07 RX ORDER — POLYETHYLENE GLYCOL 3350 17 G/17G
1 POWDER, FOR SOLUTION ORAL 2 TIMES DAILY PRN
Status: CANCELLED | OUTPATIENT
Start: 2019-02-07

## 2019-02-07 RX ORDER — OXYCODONE HYDROCHLORIDE 5 MG/1
2.5 TABLET ORAL
Status: DISCONTINUED | OUTPATIENT
Start: 2019-02-07 | End: 2019-02-08 | Stop reason: HOSPADM

## 2019-02-07 RX ORDER — FUROSEMIDE 20 MG/1
20 TABLET ORAL
Status: DISCONTINUED | OUTPATIENT
Start: 2019-02-07 | End: 2019-02-07

## 2019-02-07 RX ORDER — HYDRALAZINE HYDROCHLORIDE 20 MG/ML
5 INJECTION INTRAMUSCULAR; INTRAVENOUS
Status: DISCONTINUED | OUTPATIENT
Start: 2019-02-07 | End: 2019-02-07 | Stop reason: HOSPADM

## 2019-02-07 RX ORDER — METOPROLOL TARTRATE 1 MG/ML
1 INJECTION, SOLUTION INTRAVENOUS
Status: DISCONTINUED | OUTPATIENT
Start: 2019-02-07 | End: 2019-02-07 | Stop reason: HOSPADM

## 2019-02-07 RX ORDER — SODIUM CHLORIDE, SODIUM LACTATE, POTASSIUM CHLORIDE, CALCIUM CHLORIDE 600; 310; 30; 20 MG/100ML; MG/100ML; MG/100ML; MG/100ML
INJECTION, SOLUTION INTRAVENOUS ONCE
Status: COMPLETED | OUTPATIENT
Start: 2019-02-07 | End: 2019-02-07

## 2019-02-07 RX ORDER — CALCITRIOL 0.25 UG/1
0.25 CAPSULE, LIQUID FILLED ORAL
Status: DISCONTINUED | OUTPATIENT
Start: 2019-02-07 | End: 2019-02-07

## 2019-02-07 RX ORDER — SODIUM CHLORIDE, SODIUM LACTATE, POTASSIUM CHLORIDE, CALCIUM CHLORIDE 600; 310; 30; 20 MG/100ML; MG/100ML; MG/100ML; MG/100ML
INJECTION, SOLUTION INTRAVENOUS CONTINUOUS
Status: DISCONTINUED | OUTPATIENT
Start: 2019-02-07 | End: 2019-02-07 | Stop reason: HOSPADM

## 2019-02-07 RX ORDER — AMOXICILLIN 250 MG
1 CAPSULE ORAL NIGHTLY
Status: DISCONTINUED | OUTPATIENT
Start: 2019-02-07 | End: 2019-02-08 | Stop reason: HOSPADM

## 2019-02-07 RX ORDER — POLYETHYLENE GLYCOL 3350 17 G/17G
1 POWDER, FOR SOLUTION ORAL 2 TIMES DAILY PRN
Status: DISCONTINUED | OUTPATIENT
Start: 2019-02-07 | End: 2019-12-05 | Stop reason: HOSPADM

## 2019-02-07 RX ORDER — HYDROMORPHONE HYDROCHLORIDE 1 MG/ML
0.2 INJECTION, SOLUTION INTRAMUSCULAR; INTRAVENOUS; SUBCUTANEOUS
Status: DISCONTINUED | OUTPATIENT
Start: 2019-02-07 | End: 2019-02-07 | Stop reason: HOSPADM

## 2019-02-07 RX ADMIN — SODIUM CHLORIDE, SODIUM LACTATE, POTASSIUM CHLORIDE, CALCIUM CHLORIDE 1000 ML: 600; 310; 30; 20 INJECTION, SOLUTION INTRAVENOUS at 12:12

## 2019-02-07 RX ADMIN — FUROSEMIDE 20 MG: 10 INJECTION, SOLUTION INTRAMUSCULAR; INTRAVENOUS at 18:15

## 2019-02-07 RX ADMIN — ALBUTEROL SULFATE 2.5 MG: 2.5 SOLUTION RESPIRATORY (INHALATION) at 17:31

## 2019-02-07 RX ADMIN — GABAPENTIN 300 MG: 300 CAPSULE ORAL at 12:11

## 2019-02-07 RX ADMIN — ACETAMINOPHEN 1000 MG: 500 TABLET, FILM COATED ORAL at 12:11

## 2019-02-07 ASSESSMENT — LIFESTYLE VARIABLES
EVER_SMOKED: NEVER
SUBSTANCE_ABUSE: 0

## 2019-02-07 ASSESSMENT — ENCOUNTER SYMPTOMS
DIARRHEA: 0
BLURRED VISION: 0
SPEECH CHANGE: 0
SHORTNESS OF BREATH: 0
TREMORS: 0
HEADACHES: 0
HEARTBURN: 0
CHILLS: 0
BACK PAIN: 0
NAUSEA: 0
WHEEZING: 0
PND: 0
EYE PAIN: 0
SPUTUM PRODUCTION: 0
FALLS: 0
PALPITATIONS: 0
DIZZINESS: 0
DEPRESSION: 0
COUGH: 0
SEIZURES: 0
WEAKNESS: 0
VOMITING: 0
NECK PAIN: 0
FEVER: 0
ABDOMINAL PAIN: 1
WEIGHT LOSS: 0
CONSTIPATION: 0

## 2019-02-07 ASSESSMENT — COGNITIVE AND FUNCTIONAL STATUS - GENERAL
DAILY ACTIVITIY SCORE: 20
DRESSING REGULAR UPPER BODY CLOTHING: A LITTLE
SUGGESTED CMS G CODE MODIFIER DAILY ACTIVITY: CJ
DRESSING REGULAR LOWER BODY CLOTHING: A LITTLE
STANDING UP FROM CHAIR USING ARMS: A LITTLE
HELP NEEDED FOR BATHING: A LITTLE
CLIMB 3 TO 5 STEPS WITH RAILING: A LITTLE
TOILETING: A LITTLE
SUGGESTED CMS G CODE MODIFIER MOBILITY: CJ
MOBILITY SCORE: 21
WALKING IN HOSPITAL ROOM: A LITTLE

## 2019-02-07 ASSESSMENT — PATIENT HEALTH QUESTIONNAIRE - PHQ9
1. LITTLE INTEREST OR PLEASURE IN DOING THINGS: NOT AT ALL
2. FEELING DOWN, DEPRESSED, IRRITABLE, OR HOPELESS: NOT AT ALL
SUM OF ALL RESPONSES TO PHQ9 QUESTIONS 1 AND 2: 0

## 2019-02-08 VITALS
WEIGHT: 174.6 LBS | HEART RATE: 89 BPM | OXYGEN SATURATION: 93 % | TEMPERATURE: 97.4 F | BODY MASS INDEX: 29.81 KG/M2 | SYSTOLIC BLOOD PRESSURE: 138 MMHG | DIASTOLIC BLOOD PRESSURE: 70 MMHG | HEIGHT: 64 IN | RESPIRATION RATE: 17 BRPM

## 2019-02-08 LAB — PATHOLOGY CONSULT NOTE: NORMAL

## 2019-02-08 PROCEDURE — A9270 NON-COVERED ITEM OR SERVICE: HCPCS | Performed by: OBSTETRICS & GYNECOLOGY

## 2019-02-08 PROCEDURE — A9270 NON-COVERED ITEM OR SERVICE: HCPCS | Performed by: INTERNAL MEDICINE

## 2019-02-08 PROCEDURE — 700111 HCHG RX REV CODE 636 W/ 250 OVERRIDE (IP): Performed by: OBSTETRICS & GYNECOLOGY

## 2019-02-08 PROCEDURE — 99232 SBSQ HOSP IP/OBS MODERATE 35: CPT | Performed by: HOSPITALIST

## 2019-02-08 PROCEDURE — 700102 HCHG RX REV CODE 250 W/ 637 OVERRIDE(OP): Performed by: OBSTETRICS & GYNECOLOGY

## 2019-02-08 PROCEDURE — 700102 HCHG RX REV CODE 250 W/ 637 OVERRIDE(OP): Performed by: INTERNAL MEDICINE

## 2019-02-08 PROCEDURE — 700112 HCHG RX REV CODE 229: Performed by: OBSTETRICS & GYNECOLOGY

## 2019-02-08 RX ORDER — RALOXIFENE HYDROCHLORIDE 60 MG/1
60 TABLET, FILM COATED ORAL DAILY
COMMUNITY
End: 2019-02-21

## 2019-02-08 RX ORDER — PREDNISOLONE ACETATE 10 MG/ML
1 SUSPENSION/ DROPS OPHTHALMIC 2 TIMES DAILY
COMMUNITY
End: 2019-06-05

## 2019-02-08 RX ORDER — KETOROLAC TROMETHAMINE 5 MG/ML
1 SOLUTION OPHTHALMIC DAILY
COMMUNITY
End: 2019-06-05

## 2019-02-08 RX ORDER — OXYCODONE HYDROCHLORIDE AND ACETAMINOPHEN 5; 325 MG/1; MG/1
1-2 TABLET ORAL EVERY 4 HOURS PRN
Qty: 24 TAB | Refills: 0 | Status: SHIPPED | OUTPATIENT
Start: 2019-02-08 | End: 2019-02-12

## 2019-02-08 RX ORDER — METOCLOPRAMIDE 10 MG/1
10 TABLET ORAL 4 TIMES DAILY
Qty: 120 TAB | Refills: 1 | Status: SHIPPED | OUTPATIENT
Start: 2019-02-08 | End: 2019-05-13

## 2019-02-08 RX ADMIN — ACETAMINOPHEN 1000 MG: 500 TABLET ORAL at 17:37

## 2019-02-08 RX ADMIN — Medication 1 TABLET: at 17:37

## 2019-02-08 RX ADMIN — DOCUSATE SODIUM 100 MG: 100 CAPSULE, LIQUID FILLED ORAL at 06:11

## 2019-02-08 RX ADMIN — ALLOPURINOL 100 MG: 100 TABLET ORAL at 06:11

## 2019-02-08 RX ADMIN — BUPROPION HYDROCHLORIDE 150 MG: 150 TABLET, EXTENDED RELEASE ORAL at 06:11

## 2019-02-08 RX ADMIN — DOCUSATE SODIUM 100 MG: 100 CAPSULE, LIQUID FILLED ORAL at 17:37

## 2019-02-08 RX ADMIN — FELODIPINE 5 MG: 5 TABLET, EXTENDED RELEASE ORAL at 06:10

## 2019-02-08 RX ADMIN — OXYCODONE HYDROCHLORIDE 5 MG: 5 TABLET ORAL at 06:16

## 2019-02-08 RX ADMIN — OXYCODONE HYDROCHLORIDE 5 MG: 5 TABLET ORAL at 10:14

## 2019-02-08 RX ADMIN — CALCITRIOL 0.25 MCG: 0.25 CAPSULE ORAL at 06:11

## 2019-02-08 RX ADMIN — ENOXAPARIN SODIUM 30 MG: 100 INJECTION SUBCUTANEOUS at 17:37

## 2019-02-08 RX ADMIN — ACETAMINOPHEN 1000 MG: 500 TABLET ORAL at 06:11

## 2019-02-08 RX ADMIN — ENOXAPARIN SODIUM 30 MG: 100 INJECTION SUBCUTANEOUS at 06:10

## 2019-02-08 ASSESSMENT — ENCOUNTER SYMPTOMS
SHORTNESS OF BREATH: 0
COUGH: 0
VOMITING: 0
WHEEZING: 0
CHILLS: 0
NAUSEA: 0
ABDOMINAL PAIN: 1
FEVER: 0

## 2019-02-08 NOTE — ASSESSMENT & PLAN NOTE
History of COPD, currently no exacerbation.  continued patient on RT, bronchodilator, O2.  Close monitor patient breathing condition.

## 2019-02-08 NOTE — PROGRESS NOTES
Report received from PACU RN.   Pt up to unit at 2050  Pt is A&O x4.  Pain 4/10 and tolerable per pt  Nausea denied  Tolerating crackers and water  O2 sating at 95% on 10 liter via mask per ERAS to end at 2300.   Surgical incision to lower, transverse abd. Dressing CDI. Minimal serous drainage noted.   + Urine output via indwelling catheter.  - BM    - Flatus  Up x 1 assist  SCD's in place and on  Bed in lowest position and locked.  Pt resting comfortably now.  Review plan of care with patient  Call light within reach  Hourly rounds in place  All needs met at this time

## 2019-02-08 NOTE — ASSESSMENT & PLAN NOTE
History of CAD, currently no angina.  Continue monitor.  Hold of Plavix given recent history.  Restart Plavix when ok by surgery.  continued patient beta-blocker, calcium channel blocker and rosuvastatin.

## 2019-02-08 NOTE — RESPIRATORY CARE
COPD EDUCATION by COPD CLINICAL EDUCATOR  2/8/2019 at 7:50 AM by Amara Flores     Patient reviewed by COPD education team. Patient does not qualify for COPD program.

## 2019-02-08 NOTE — OR NURSING
1706-Received from OR via [employer]. S/P Lap assisted WEST, BSO. Oral airway in place. Dressing to left low quad. Clean, dry and intact. Melton cath to bsd. Urine clear yellow.  Bedside report received from RN. And Anesthesiologist.    1715-airway removed. 02 sat 87-89%, 02 @ 10Lmask.     1730-resp. Labored, breaths sounds tight, 02 sats 87-89% 0n 10L. Albuterol neb given. Dr. Centeno at bedside to see patient.     1815-lasix given as ordered.    1900-resp. Less labored. 02 sats 94% on 10L oxymask.  Sips of water given.    1944-notified  of patients room and status. Pt. Spoke with him on the phone.    2014-Report called to RAMA Jade.  Pt. Placed on transport list.

## 2019-02-08 NOTE — PROGRESS NOTES
Hospital Medicine Daily Progress Note    Date of Service  2/8/2019    Chief Complaint  69 y.o. female admitted 2/7/2019 for total abdominal hysterectomy.       Interval Problem Update  Tolerated surgery well yesterday  BP under good control  Her breathing is at baseline. She has oxygen and concentrater at home     Consultants/Specialty  hospitalist - consulting  OBGYN- primary     Code Status  Full code     Disposition  Per primary team    Review of Systems  Review of Systems   Constitutional: Negative for chills and fever.   Respiratory: Negative for cough, shortness of breath and wheezing.    Gastrointestinal: Positive for abdominal pain. Negative for nausea and vomiting.   All other systems reviewed and are negative.       Physical Exam  Temp:  [36 °C (96.8 °F)-36.2 °C (97.2 °F)] 36.2 °C (97.2 °F)  Pulse:  [88-99] 98  Resp:  [16-18] 17  BP: (114-141)/(64-86) 141/74  SpO2:  [88 %-96 %] 92 %    Physical Exam   Constitutional: She is oriented to person, place, and time. She appears well-developed and well-nourished.   HENT:   Head: Normocephalic and atraumatic.   Eyes: Conjunctivae are normal. Right eye exhibits no discharge. Left eye exhibits no discharge.   Pulmonary/Chest: Effort normal and breath sounds normal. No respiratory distress. She has no wheezes.   Neurological: She is alert and oriented to person, place, and time.   Skin: Skin is warm and dry.   Nursing note and vitals reviewed.      Fluids    Intake/Output Summary (Last 24 hours) at 02/08/19 1339  Last data filed at 02/08/19 0822   Gross per 24 hour   Intake             1120 ml   Output              850 ml   Net              270 ml       Laboratory                        Imaging  No orders to display        Assessment/Plan  * Ovarian mass, left- (present on admission)   Assessment & Plan    Status post WEST 2/7  Tolerated procedure well  Ok for d/c per hospitalist stand point      Chronic gout of right foot- (present on admission)   Assessment & Plan     Chronic gout no signs of flare.  Continue pain control and allopurinol.     Other emphysema (HCC)- (present on admission)   Assessment & Plan    History of COPD, currently no exacerbation.  continued patient on RT, bronchodilator, O2.  Close monitor patient breathing condition.       Stage 3 chronic kidney disease (HCC)- (present on admission)   Assessment & Plan    Patient has CKD stage III.  creatinine 1.6, at baseline.  stable     HTN (hypertension)- (present on admission)   Assessment & Plan    Blood pressure stable  Continue home medication    Prn hydralazine     CAD (coronary artery disease)- (present on admission)   Assessment & Plan    History of CAD, currently no angina.  Continue monitor.  Hold of Plavix given recent history.  Restart Plavix when ok by surgery.  continued patient beta-blocker, calcium channel blocker and rosuvastatin.     Chronic respiratory failure with hypoxia (HCC)- (present on admission)   Assessment & Plan    History of COPD, currently no exacerbation.  continued patient on RT, bronchodilator, O2.  Close monitor patient breathing condition.            VTE prophylaxis: lovenox

## 2019-02-08 NOTE — OR NURSING
2020- Report from cecilio Tran waiting for transport.    2040- Transport here.  Pt transported with belongings and chart, on oxygen.

## 2019-02-08 NOTE — OP REPORT
DATE OF SERVICE:  02/07/2019    PREOPERATIVE DIAGNOSES:  Persistent pelvic cystic mass, menopausal female,   rule out ovarian neoplasm, history of chronic kidney disease, hypertension,   and chronic obstructive pulmonary disease in addition to obesity.    POSTOPERATIVE DIAGNOSES:  Persistent pelvic cystic mass, menopausal female,   rule out ovarian neoplasm, history of chronic kidney disease, hypertension,   and chronic obstructive pulmonary disease in addition to obesity including   extensive pelvic adhesions, 4 cm broad ligament cystic ovarian mass and   sclerotic and inherent right adnexa.    PRINCIPAL PROCEDURES:  Laparotomy, total abdominal hysterectomy, bilateral   salpingo-oophorectomy, and lysis of adhesions.    SURGEON:  Henrry Centeno MD    ASSISTANT:  Kathy Boles MD    ANESTHESIOLOGIST:  Robert Cerrato DO    ANESTHESIA:  General endotracheal.    PRINCIPAL PROCEDURE AS FOLLOWS:  After adequate general endotracheal   anesthesia was ascertained, the patient was examined under anesthesia, which   did not reveal significant mass secondary to her morbid obesity, but secondary   to upper abdominal softness and no evidence of any supraumbilical mass   appreciated, the decision was made to continue with the Pfannenstiel incision   as oppose to mid-vertical.  A low transverse Pfannenstiel incision was made   down to the fascia.  The fascia was incised medial to lateral.  The rectus   muscles dissected off this rectus sheath superiorly and inferiorly.  The   peritoneum was identified, entered and extended superiorly and inferiorly.  It   should be noted this patient had a large amount of intestine that was deep   into the pelvis and secondary to her body habitus, the large percentage of   this intestine was done in the lower pelvis.  The patient was placed in deep   Trendelenburg position to allow packing of the intestine.  It should be noted   that O'Bulmaro-O'Dias, a large [place] were not significant  "enough to   affect adequate packing to allow visualization of the lower pelvis.  When   visualization was effective, the uterus was noted to be adherent to the   bladder superiorly and anteriorly and the uterus was small, approximately 5   weeks' size.  There was indeed a cystic mass that was in the left perimetrium   and it was approximately 4-5 cm in size, seemed to be fluid filled.  The right   side was adherent to the pelvic side wall and so more significant packing   needed to be done to get visualization of this pelvis with large fat pads that   were extruding into the incision.  Ultimately, a Bookwalter retraction device   was used with effective lateral and superior and inferior resection of the   tissue and at this time, the attention was drawn to the hysterectomy whereby   the right round ligament was doubly clamped, cut, and tied and the bladder was   inferomedially dissected off the lower uterine segment.  The attention was   drawn to try to ascertain the right adnexa, which was adherent to the pelvic   side wall.  Attention was drawn to grasping the residual ovarian mass, which   was adherent.  This mass essentially shredded with all devices used to try to   lift it out of the pelvis, so careful dissection was done with Metzenbaum   scissors with try and preservation of the broad ligament and as well as the   ureter coursing in the medial aspect of the broad ligament.  Once the ovarian   and utero-ovarian ligament were brought superiorly, the avascular space of   Graves was entered.  The pedico-infundibulopelvic ligament was then doubly   clamped, cut and tied with 0 Vicryl suture ligature and the ovarian mass was   removed.  A like procedure was done on the left side.  A large capsule was   seen and during the dissection process, there was fluid that was extruding   from this pelvic mass and attention was drawn to \"identifying the ureter,   which was deep in the pelvis and superior to same, the " infundibulopelvic   ligament was skeletonized and doubly clamped, cut and tied with 0 Vicryl   suture ligature.  The cystic mass was delivered with Metzenbaum dissection to   allow it to be brought up into the field because it was indeed adherent to the   pelvic side wall and deep in the parametrium.  This cyst and ovarian mass was   sent to pathology separately in toto.  At this point, with progressive   clamping, cutting and tying, the cardinal and uterine vasculature was tied   with 0 Vicryl suture ligature.  The bladder was further dissected below the   cervix and the vaginal vault was entered.  The cervix and uterus was removed   with Bebe scissors circumferentially.  The vaginal cuff was closed with 0   Vicryl suture ligature in a figure-of-eight fashion incorporating the   cardinal ligaments.  It should be noted that the ureters were then reevaluated   and found to be coursing and had not been disturbed during this distinct   dissection in the retroperitoneal spaces.  The plan at this point after   closure of the vaginal cuff with hemostasis assured was to place Elaina for   further hemostasis and this was affected.  A Bookwalter retractor was removed.    Al intestinal pads and packing laps were removed and counted x4 with   adequate count, the intestine was brought down into the pelvis and the   peritoneum was closed with running 0 Vicryl.  The fascia was closed with 0 PDS   acid.  It should be noted this patient had essentially no rectus abdominal   muscles visualized.  0 PDS from right to left, left to right crossing in the   midline.  Subcutaneous tissues were closed with interrupted 3-0 Vicryl.  The   skin was closed with a subcuticular stitch of 4-0 Vicryl on a Van.  The skin   was dressed with Steri-Strips and the Mepilex and Tegaderm.  Estimated blood   loss was less than 300 mL.  Sponge and needle count were correct x3.  Patient   was draining clear yellow urine at the termination of  procedure.       ____________________________________     MD SANDY BROOKS / LUKE    DD:  02/08/2019 09:30:59  DT:  02/08/2019 09:59:37    D#:  [id number]  Job#:  [id number]

## 2019-02-08 NOTE — PROGRESS NOTES
1950 Report received from PACU RN  Pt up to unit at 2050.  Pt is A&O x4.  Pain 4/10 and tolerable per pt  Nausea denied  Tolerating crackers and water  Surgical incision to lower, transverse abd. Dressing CDI. Minimal serosanguinous drainage noted.  + Urine output via indwelling catheter  - BM    - Flatus  Up x 1 assist  SCD's in place and on   Bed in lowest position and locked.  Pt resting comfortably now.  Review plan of care with patient  Call light within reach  Hourly rounds in place  All needs met at this time

## 2019-02-08 NOTE — CARE PLAN
Problem: Communication  Goal: The ability to communicate needs accurately and effectively will improve  Outcome: PROGRESSING AS EXPECTED  Pt oriented to floor. All questions answered at this time. Hourly rounding in place.     Problem: Safety  Goal: Will remain free from injury  Outcome: PROGRESSING AS EXPECTED  Proper fall precautions in place. Call light within reach and encouraged to use. Hourly rounding in practice.

## 2019-02-08 NOTE — CONSULTS
Hospital Medicine consultation note    Date of Service  2/7/2019    Chief Complaint  Ovarian neoplasm    Reason for consultation: Medical management for COPD, hypertension, kidney dysfunction.    Requesting physician: Dr. Centeno    History of Presenting Illness  69 y.o. female with a past medical history of COPD, hypertension essential, chronic kidney disease presented 2/7/2019 with ovarian neoplasm status post hysterectomy and salpingo-oophorectomy.  Hospital medicine service is requested to consult and help manage patient's chronic medical problems.  Patient underwent surgery today and tolerated today.  I examined patient on recovery unit.  Patient is still lethargic and has recovered from surgery.  Patient was noticed to have chronic COPD currently breathing condition at baseline.  Patient has been using oxygen at home and breathing treatment.  Patient also presented with hypertension essential.  Patient currently complaining of incisional pain. Patient's pain is local, 3-4/10, intermittent and does not radiate to other location, sharp and with some tingling. Can be controlled by pain meds. Dressing in place.  Patient was noticed to have decreased urine output.  Patient otherwise denies fever, chills, nausea, vomiting, adb pain, SOB, CP, headache, constipation, diarrhea, cough, or sputum.      Primary Care Physician  Maisha Bay M.D.      Code Status  Code: Full code    Review of Systems  Review of Systems   Constitutional: Negative for chills, fever and weight loss.   HENT: Negative for congestion, ear discharge, ear pain, hearing loss and nosebleeds.    Eyes: Negative for blurred vision and pain.   Respiratory: Negative for cough, sputum production, shortness of breath and wheezing.    Cardiovascular: Negative for chest pain, palpitations, leg swelling and PND.   Gastrointestinal: Positive for abdominal pain. Negative for constipation, diarrhea, heartburn, nausea and vomiting.   Genitourinary: Negative for  dysuria, frequency and hematuria.   Musculoskeletal: Negative for back pain, falls, joint pain and neck pain.   Skin: Negative for rash.   Neurological: Negative for dizziness, tremors, speech change, seizures, weakness and headaches.   Psychiatric/Behavioral: Negative for depression, substance abuse and suicidal ideas.      Past Medical History  Past Medical History:   Diagnosis Date   • Ovarian neoplasm 8/10/2017    Incidental finding of the CT Renal  - 6.8 x 6.8 x 6 cm cystic left adnexal lesion may represent an ovarian cystadenoma or cystadenocarcinoma.     • Stroke ([facility]) 2016    pt states it was a TIA no residual on plavix   • Vitamin d deficiency 7/1/2013    Was taking 2000 - still 26; incr to 4000   • CKD (chronic kidney disease) stage 4, GFR 15-29 ml/min ([facility]) 7/1/2013    Did see Dr. Perkins; current GFR 27 6/26/13; Has both kidneys, only one is functioning.   • Arthritis     all over, history of gout both feet, great toes   • Asthma     daily inhalers   • Cataract    • COPD    • Dental disorder     lower partial   • Heart burn    • History of anemia    • Hypercholesteremia    • Hypertension    • Indigestion    • Pneumonia ? 2012   • Pulmonary emphysema ([facility])     Oxygen as needed, through [employer], will be switching to [employer]       Surgical History  Past Surgical History:   Procedure Laterality Date   • CATARACT PHACO WITH IOL Right 1/29/2019    Procedure: CATARACT PHACO WITH IOL;  Surgeon: Xander Hemphill M.D.;  Location: SURGERY SAME DAY [facility];  Service: Ophthalmology   • CATARACT PHACO WITH IOL Left 1/15/2019    Procedure: CATARACT PHACO WITH IOL;  Surgeon: Xander Hemphill M.D.;  Location: SURGERY SAME DAY [facility];  Service: Ophthalmology   • APPENDECTOMY      years ago, ?2000       Medications  No current facility-administered medications on file prior to encounter.      Current Outpatient Prescriptions on File Prior to Encounter   Medication Sig Dispense Refill   • calcitRIOL (ROCALTROL)  "0.25 MCG Cap Take 1 Cap by mouth every 48 hours. 45 Cap 3   • cyclobenzaprine (FLEXERIL) 10 MG Tab Take 1 Tab by mouth 3 times a day as needed. FOR MILD PAIN. 90 Tab 3   • ipratropium-albuterol (COMBIVENT RESPIMAT)  MCG/ACT Aero Soln Inhale 1 Puff by mouth 4 times a day. 3 Inhaler 3   • clopidogrel (PLAVIX) 75 MG Tab Take 1 Tab by mouth every day. 90 Tab 3   • omeprazole (PRILOSEC) 20 MG delayed-release capsule Take 1 Cap by mouth every day. 90 Cap 3   • felodipine (PLENDIL) 5 MG TABLET SR 24 HR Take 2 Tabs by mouth every day. 270 Tab 1   • buPROPion SR (WELLBUTRIN-SR) 150 MG TABLET SR 12 HR sustained-release tablet Take 1 Tab by mouth every day. 90 Tab 1   • furosemide (LASIX) 20 MG Tab Take 1 Tab by mouth 2 times a day. 180 Tab 1   • rosuvastatin (CRESTOR) 10 MG Tab Take 1 Tab by mouth every evening. 90 Tab 1   • albuterol (PROVENTIL) 2.5mg/3ml Nebu Soln solution for nebulization 3 mL by Nebulization route every four hours as needed for Shortness of Breath. 75 mL 11   • allopurinol (ZYLOPRIM) 100 MG Tab Take 1 Tab by mouth every day. 90 Tab 3       Family History  Family History   Problem Relation Age of Onset   • Diabetes Sister    • Cancer Sister      reviewed and felt non pertinent to this encounter     Social History  Social History   Substance Use Topics   • Smoking status: Former Smoker     Packs/day: 1.00     Years: 40.00     Types: Cigarettes     Quit date: 10/1/2010   • Smokeless tobacco: Never Used   • Alcohol use Yes      Comment: 2 a day       Allergies  No Known Allergies     Physical Exam  Laboratory   Vitals/ General Appearance:   Weight/BMI: Body mass index is 29.97 kg/m².  Blood pressure 149/76, pulse 93, temperature 36.2 °C (97.1 °F), temperature source Temporal, resp. rate 16, height 1.626 m (5' 4\"), weight 79.2 kg (174 lb 9.7 oz), SpO2 95 %, not currently breastfeeding.   Vitals:    02/07/19 1830 02/07/19 1845 02/07/19 1900 02/07/19 1915   BP:       Pulse: 94 88 91 93   Resp:       Temp:  "      [facility]:       SpO2: 92% 95% 95% 95%   Weight:       Height:        Oxygen Therapy:  Pulse Oximetry: 95 %, O2 (LPM): 10, O2 Delivery: Oxymask    Constitutional:  well developed, well nourished  HENMT: Normocephalic, atraumatic, b/l ears normal, nose normal  Eyes:  EOMI, conjunctiva normal, no discharge  Neck: no tracheal deviation, supple  Cardiovascular: normal heart rate, normal rhythm, no murmurs, no rubs or gallops; no cyanosis, clubbing or edema  Lungs: Respiratory effort is normal, normal breath sounds, breath sounds clear to auscultation b/l, no rales, rhonchi or wheezing  Abdomen: soft, non-tender, no guarding or rebound, active BS, no mass  Skin: warm, dry, no erythema, no rash  Neurologic: Alert and oriented, strength 5/5, no focal deficits, CN II-XII normal  Psychiatric: No anxiety or depression  Lymph node: No lymphadenopathy appreciated in the neck groin and axillary area.   Extremities: Bilateral lower extremities no pitting edema, bilateral pulses symmetric          Assessment/Plan  * Ovarian mass, left- (present on admission)   Assessment & Plan    Status post surgical resection today.  Tolerated.  Postoperative care per surgery.  Close monitor.  Patient is on IV fluid, I discontinue Lasix.     Chronic gout of right foot- (present on admission)   Assessment & Plan    Chronic gout no signs of flare.  Continue pain control and allopurinol.     Other emphysema (HCC)- (present on admission)   Assessment & Plan    History of COPD, currently no exacerbation.  I continued patient on RT, bronchodilator, O2.  Close monitor patient breathing condition.  Avoid fluid overload.     Stage 3 chronic kidney disease (HCC)- (present on admission)   Assessment & Plan    Patient has CKD stage III.  creatinine 1.6, at baseline.  Continue IV fluid rehydration, I temporally hold patient's Lasix  Close monitor patient's fluid status.     HTN (hypertension)- (present on admission)   Assessment & Plan    Blood pressure  stable  Continue home medication    Prn hydralazine     CAD (coronary artery disease)- (present on admission)   Assessment & Plan    History of CAD, currently no angina.  Continue monitor.  Hold of Plavix given recent history.  Restart Plavix when ok by surgery.  I continued patient beta-blocker, calcium channel blocker and rosuvastatin.     Chronic respiratory failure with hypoxia (HCC)- (present on admission)   Assessment & Plan    History of COPD, currently no exacerbation.  I continued patient on RT, bronchodilator, O2.  Close monitor patient breathing condition.  Avoid fluid overload.         I anticipate this patient will require at least two midnights for appropriate medical management, necessitating inpatient admission.    Prophylaxis: SCDs            No results for input(s): ALTSGPT, ASTSGOT, ALKPHOSPHAT, TBILIRUBIN, DBILIRUBIN, GAMMAGT, AMYLASE, LIPASE, ALB, PREALBUMIN, GLUCOSE in the last 72 hours.              Lab Results   Component Value Date    TROPONINI <0.01 08/27/2016       Imaging  No orders to display          I spent 96 mins reviewed the chart, notes, vitals, labs, imaging, ordering labs, evaluating Ms. Bowden for assessment, enacting the plan above. 50% of the time was spent in counseling Ms. Bowden and answering questions. Medical decision making is therefore complex. Time was devoted to counseling and coordinating care including review of records, pertinent lab data and studies, as well as discussing diagnostic evaluation and work up, planned therapeutic interventions and future disposition of care. Where indicated, the assessment and plan reflect discussion of patient with consultants, other healthcare providers, family members, and additional research needed to obtain further information in formulating the plan of care for Ms. Bowden .     I have discussed with RN and FANNY and MARTIN and other consultants about patient's plan.     Thank you for allowing hospitalists to participate in patient care,  hospitalist service will continue to follow along.

## 2019-02-08 NOTE — PROGRESS NOTES
Postop Day 1. total abdominal hysterectomy, bilateral oophorectomy, bilateral salpingectomy, with extensive PRISCILLA   Patient sitting up in chair , tolerating liquids , breathing unlabored with O-2 : nsal canula   Past Medical History:   Diagnosis Date   • Arthritis     all over, history of gout both feet, great toes   • Asthma     daily inhalers   • Cataract    • CKD (chronic kidney disease) stage 4, GFR 15-29 ml/min ([facility]) 7/1/2013    Did see Dr. Perkins; current GFR 27 6/26/13; Has both kidneys, only one is functioning.   • COPD    • Dental disorder     lower partial   • Heart burn    • History of anemia    • Hypercholesteremia    • Hypertension    • Indigestion    • Ovarian neoplasm 8/10/2017    Incidental finding of the CT Renal  - 6.8 x 6.8 x 6 cm cystic left adnexal lesion may represent an ovarian cystadenoma or cystadenocarcinoma.     • Pneumonia ? 2012   • Pulmonary emphysema ([facility])     Oxygen as needed, through [employer], will be switching to [employer]   • Stroke ([facility]) 2016    pt states it was a TIA no residual on plavix   • Vitamin d deficiency 7/1/2013    Was taking 2000 - still 26; incr to 4000        Past Surgical History:   Procedure Laterality Date   • MINI LAPAROTOMY N/A 2/7/2019    Procedure: MINI LAPAROTOMY- EXPLORATORY;  Surgeon: Henrry Centeno M.D.;  Location: SURGERY SAME DAY [facility];  Service: Gynecology   • ABDOMINAL HYSTERECTOMY TOTAL N/A 2/7/2019    Procedure: ABDOMINAL HYSTERECTOMY TOTAL;  Surgeon: Henrry Centeno M.D.;  Location: SURGERY SAME DAY [facility];  Service: Gynecology   • SALPINGO OOPHORECTOMY Bilateral 2/7/2019    Procedure: SALPINGO OOPHORECTOMY;  Surgeon: Henrry Centeno M.D.;  Location: SURGERY SAME DAY [facility];  Service: Gynecology   • CATARACT PHACO WITH IOL Right 1/29/2019    Procedure: CATARACT PHACO WITH IOL;  Surgeon: Xander Hemphill M.D.;  Location: SURGERY SAME DAY [facility];  Service: Ophthalmology   • CATARACT PHACO WITH IOL Left 1/15/2019    Procedure:  CATARACT PHACO WITH IOL;  Surgeon: Xander Hemphill M.D.;  Location: SURGERY SAME DAY [facility];  Service: Ophthalmology   • APPENDECTOMY      years ago, ?2000       Current Facility-Administered Medications:   •  ipratropium-albuterol (COMBIVENT RESPIMAT) inhaler 1 Puff, 1 Puff, Inhalation, 4X/DAY, Mariya Miller M.D., Stopped at 02/08/19 0900  •  albuterol inhaler 2 Puff, 2 Puff, Inhalation, Q4HRS PRN, Henrry Centeno M.D.  •  felodipine (PLENDIL) tablet 5 mg, 5 mg, Oral, Q DAY, Henrry Centeno M.D., 5 mg at 02/08/19 0610  •  allopurinol (ZYLOPRIM) tablet 100 mg, 100 mg, Oral, DAILY, Mariya Miller M.D., 100 mg at 02/08/19 0611  •  albuterol (PROVENTIL) 2.5mg/0.5ml nebulizer solution 2.5 mg, 2.5 mg, Nebulization, Q4HRS PRN, Mariya Miller M.D.  •  buPROPion SR (WELLBUTRIN-SR) tablet 150 mg, 150 mg, Oral, DAILY, Mariya Miller M.D., 150 mg at 02/08/19 0611  •  calcitRIOL (ROCALTROL) capsule 0.25 mcg, 0.25 mcg, Oral, Q48HRS, Mariya Miller M.D., 0.25 mcg at 02/08/19 0611  •  rosuvastatin (CRESTOR) tablet 10 mg, 10 mg, Oral, Q EVENING, Mariya Miller M.D.  •  acetaminophen (TYLENOL) tablet 1,000 mg, 1,000 mg, Oral, Q6HRS, Henrry Centeno M.D., 1,000 mg at 02/08/19 0611  •  bisacodyl (DULCOLAX) suppository 10 mg, 10 mg, Rectal, Q24HRS PRN, Henrry Centeno M.D.  •  dexamethasone (DECADRON) injection 4 mg, 4 mg, Intravenous, Once PRN, Henrry Centeno M.D.  •  diphenhydrAMINE (BENADRYL) injection 25 mg, 25 mg, Intravenous, Q6HRS PRN, Henrry Centeno M.D.  •  docusate sodium (COLACE) capsule 100 mg, 100 mg, Oral, BID, Henrry Centeno M.D., 100 mg at 02/08/19 0611  •  enoxaparin (LOVENOX) inj 30 mg, 30 mg, Subcutaneous, Q12HRS, Henrry Centeno M.D., 30 mg at 02/08/19 0610  •  magnesium hydroxide (MILK OF MAGNESIA) suspension 30 mL, 30 mL, Oral, QDAY PRN, Henrry Centeno M.D.  •  morphine (pf) 4 mg/ml injection 2 mg, 2 mg, Intravenous, Q3HRS PRN, Henrry Centeno M.D.  •  ondansetron (ZOFRAN) syringe/vial injection 4 mg, 4 mg, Intravenous,  Q4HRS PRN, Henrry Centeno M.D.  •  oxyCODONE immediate-release (ROXICODONE) tablet 2.5 mg, 2.5 mg, Oral, Q3HRS PRN, Henrry Centeno M.D.  •  oxyCODONE immediate-release (ROXICODONE) tablet 5 mg, 5 mg, Oral, Q3HRS PRN, Henrry Centeno M.D., 5 mg at 02/08/19 1014  •  scopolamine (TRANSDERM-SCOP) patch 1 Patch, 1 Patch, Transdermal, Q72HRS PRN, Henrry Centeno M.D.  •  senna-docusate (PERICOLACE or SENOKOT S) 8.6-50 MG per tablet 1 Tab, 1 Tab, Oral, Nightly, Henrry Centeno M.D.  •  senna-docusate (PERICOLACE or SENOKOT S) 8.6-50 MG per tablet 1 Tab, 1 Tab, Oral, Q24HRS PRN, Henrry Centeno M.D.    Facility-Administered Medications Ordered in Other Encounters:   •  bisacodyl (DULCOLAX) suppository 10 mg, 10 mg, Rectal, Q24HRS PRN, Henrry Centeno M.D.  •  dexamethasone (DECADRON) injection 4 mg, 4 mg, Intravenous, Once PRN, Henrry Centeno M.D.  •  diphenhydrAMINE (BENADRYL) injection 25 mg, 25 mg, Intravenous, Q6HRS PRN, Henrry Centeno M.D.  •  docusate sodium (COLACE) capsule 100 mg, 100 mg, Oral, BID, Henrry Centeno M.D.  •  fleet enema 133 mL, 1 Each, Rectal, Once PRN, Henrry Centeno M.D.  •  haloperidol lactate (HALDOL) injection 1 mg, 1 mg, Intravenous, Q6HRS PRN, Henrry Centeno M.D.  •  ondansetron (ZOFRAN) syringe/vial injection 4 mg, 4 mg, Intravenous, Q4HRS PRN, Henrry W. Jacobo, M.D.  •  polyethylene glycol/lytes (MIRALAX) PACKET 1 Packet, 1 Packet, Oral, BID PRN, Henrry Centeno M.D.  •  scopolamine (TRANSDERM-SCOP) patch 1 Patch, 1 Patch, Transdermal, Q72HRS PRN, Henrry Centeno M.D.  •  senna-docusate (PERICOLACE or SENOKOT S) 8.6-50 MG per tablet 1 Tab, 1 Tab, Oral, Nightly, Henrry Centeno M.D.  •  senna-docusate (PERICOLACE or SENOKOT S) 8.6-50 MG per tablet 1 Tab, 1 Tab, Oral, Q24HRS PRN, Henrry Centeno M.D.  •  acetaminophen (TYLENOL) tablet 1,000 mg, 1,000 mg, Oral, Q6HRS, Henrry Centeno M.D.  •  morphine (pf) 4 mg/ml injection 2 mg, 2 mg, Intravenous, Q3HRS PRN, Henrry TOTH  "JESSE Centeno  •  oxyCODONE immediate-release (ROXICODONE) tablet 2.5 mg, 2.5 mg, Oral, Q3HRS PRN, Henrry Centeno M.D.  •  oxyCODONE immediate-release (ROXICODONE) tablet 5 mg, 5 mg, Oral, Q3HRS PRN, Henrry Centeno M.D.  •  enoxaparin (LOVENOX) inj 30 mg, 30 mg, Subcutaneous, Q12HRS, Henrry Centeno M.D.    Subjective:  Pain: moderate  Ambulatory : positive - up in chair     Voiding: dickey draining clear yellow urine ,  Gas per rectum : negative, hypoactive BS     BM: negative    Objective :   Vitals:    02/08/19 0105 02/08/19 0200 02/08/19 0409 02/08/19 0822   BP: 140/86  127/78 141/74   Pulse: 92 89 92 98   Resp: 17 16 16 17   Temp: 36.1 °C (96.9 °F)  36 °C (96.8 °F) 36.2 °C (97.2 °F)   TempSrc: Temporal  Temporal Temporal   SpO2: 92% 93% 92% 92%   Weight:       Height:           Chest: Clear to auscultation  Abdomen:negative findings: no masses palpable, no organomegaly, liver span normal to percussion, soft, non-tender, BS : hypoactive , no rebound , no guarding , +1-2 tender ,     Incision: dry and intact, with Myiplex  Pelvic \": negative  Extremities: Normal and no tenderness, calves, neg Homans        LABS:   No results for input(s): WBC, RBC, HEMOGLOBIN, HEMATOCRIT, MCV, MCH, RDW, PLATELETCT, MPV, NEUTSPOLYS, LYMPHOCYTES, MONOCYTES, EOSINOPHILS, BASOPHILS, RBCMORPHOLO in the last 72 hours.  No results for input(s): SODIUM, POTASSIUM, CHLORIDE, CO2, GLUCOSE, BUN, CPKTOTAL in the last 72 hours.  I/O last 3 completed shifts:  In: 920 [P.O.:620; I.V.:300]  Out: 650 [Urine:650]      Ass:   POD#  1  total abdominal hysterectomy, bilateral oophorectomy, bilateral salpingectomy  CKD, HTN , COPD , with O-2 requirement Doing Well, incision healing well.   Afebrile   P. Will plan D/C this PM; if hospitalist agree,   , if increased bowel function , and ambulatory  Ambulate, advance diet and transition to full oral analgesia                                           post    "

## 2019-02-08 NOTE — PROGRESS NOTES
Report received from RN, assumed care at 0700  Pt is A0X4, and responds appropriately   Pt declines any SOB, chest pain, new onset of numbness/ tingiling  Pt rates pain at 3/10, on a scale of 1-10, pt declines pain medication needs at this time  Pt has a dickey in place draining to gravity, stat lock is in place, dickey has light sedimentation in dickey,   Pt has - flatus, + bowel sounds,  BM on 2/6/2019  Pt ambulates with a stand by assist   Pt is tolerating a regular diet, pt denies any nausea/vomiting  Pt has lower abdominal transverse incision, dressing intact, old drainage noted at this time        Plan of care discussed, all questions answered. Explained importance of calling before getting OOB and pt verbalizes understanding. Explained importance of oral care. Call light is within reach, treaded slipper socks on, bed in lowest/ locked position, hourly rounding in place, all needs met at this time

## 2019-02-08 NOTE — OR SURGEON
Immediate Post OP Note    PreOp Diagnosis: persistent pelvic cystic mass                                 Menopausal female , R/o ovarian neoplasm                                 CKD, HTN , COPD     PostOp Diagnosis: persistent pelvic cystic mass                                 Menopausal female , R/o ovarian neoplasm                                 CKD, HTN , COPD                                 Extensive pelvic adhesions, 4 cm broad ligament cystic ovarian mass,                                 sclerotic and adherent Right Adnexa    Procedure(s):  MINI LAPAROTOMY- EXPLORATORY - Wound Class: Clean Contaminated  ABDOMINAL HYSTERECTOMY TOTAL - Wound Class: Clean Contaminated  SALPINGO OOPHORECTOMY - Wound Class: Clean Contaminated    Surgeon(s):  JESSE Cano M.D.    Anesthesiologist/Type of Anesthesia:  Anesthesiologist: Robert Cerrato D.O./General    Surgical Staff:  Circulator: Analy Odell R.N.  Relief Circulator: Courtney Dinero R.N.  Scrub Person: Rafa Kendrick    Specimens removed if any:  ID Type Source Tests Collected by Time Destination   A : Pelvic washing Tissue Abdominal PATHOLOGY SPECIMEN Henrry Centeno M.D. 2/7/2019 1518    B : Uterus, cervix, right fallopian tube and right ovary Tissue Uterus PATHOLOGY SPECIMEN Henrry Centeno M.D. 2/7/2019 1518    C : Left broad ligament cyst Body Fluid Cyst PATHOLOGY SPECIMEN Henrry Centeno M.D. 2/7/2019 1553        Estimated Blood Loss: 400cc     Findings: left  4 cm clear ovarian cyst , with thicken capsule , right adnexa adherent to sidewall and overall poor tensile strength to all tissue     Complications: none        2/7/2019 5:13 PM Henrry Centeno M.D.

## 2019-02-09 NOTE — DISCHARGE SUMMARY
Discharge Summary:      Lauren Bowden      Admit Date:   2019  Discharge Date:  2019     Admitting diagnosis:  CYST OF LEFT OVARY  Ovarian benign neoplasm  CKD (chronic kidney disease), stage III (HCC)  COPD (chronic obstructive pulmonary disease) (HCC)  Left ovarian cyst  Ovarian benign neoplasm  CKD (chronic kidney disease), stage III (HCC)  COPD (chronic obstructive pulmonary disease) (HCC)  Ovarian benign neoplasm  CKD (chronic kidney disease), stage III (HCC)  COPD (chronic obstructive pulmonary disease) (HCC)     History:  Past Medical History:   Diagnosis Date   • Arthritis     all over, history of gout both feet, great toes   • Asthma     daily inhalers   • Cataract    • CKD (chronic kidney disease) stage 4, GFR 15-29 ml/min ([facility]) 2013    Did see Dr. Perkins; current GFR 27 13; Has both kidneys, only one is functioning.   • COPD    • Dental disorder     lower partial   • Heart burn    • History of anemia    • Hypercholesteremia    • Hypertension    • Indigestion    • Ovarian neoplasm 8/10/2017    Incidental finding of the CT Renal  - 6.8 x 6.8 x 6 cm cystic left adnexal lesion may represent an ovarian cystadenoma or cystadenocarcinoma.     • Pneumonia ?    • Pulmonary emphysema (HCC)     Oxygen as needed, through [employer], will be switching to [employer]   • Stroke ([facility]) 2016    pt states it was a TIA no residual on plavix   • Vitamin d deficiency 2013    Was taking 2000 - still 26; incr to 4000     OB History    Para Term  AB Living   2         1   SAB TAB Ectopic Molar Multiple Live Births             2      # Outcome Date GA Lbr Travon/2nd Weight Sex Delivery Anes PTL Lv   2       Vag-Spont  N ISAC   1       Vag-Spont   DEC           Patient has no known allergies.  Patient Active Problem List    Diagnosis Date Noted   • Ovarian mass, left 08/10/2017     Priority: High   • Chronic gout of right foot 10/11/2018   • Other emphysema (HCC) 2018   •  Colon cancer screening 2018   • TIA (transient ischemic attack) 2018   • Obesity (BMI 30-39.9) 2018   • Stage 3 chronic kidney disease (HCC) 2017   • Lung nodule, multiple 2017   • Chronic respiratory failure with hypoxia (HCC) 2010   • CAD (coronary artery disease) 2010   • HTN (hypertension) 2010        Hospital Course:   69 y.o. , was admitted with the above mentioned diagnosis, underwent Xlap, WEST/ BSO  , left ovarain neoplasm ,. Patient postoperative  Was remarkable for control of underlying medical issues, Oxygen per nasal canula ., and CKD, were well monitored . And patient able to void well , clear urine , and maintain adequate Sao2 levels on nasal canula ., with progressive advancement in diet , ambulation and toleration of oral analgesia. Patient without complaints today and desires discharge.      Vitals:    19 0200 19 0409 19 0822 19 1229   BP:  127/78 141/74 138/70   Pulse: 89 92 98 89   Resp: 16 16 17 17   Temp:  36 °C (96.8 °F) 36.2 °C (97.2 °F) 36.3 °C (97.4 °F)   TempSrc:  Temporal Temporal Temporal   SpO2: 93% 92% 92% 93%   Weight:       Height:           Current Facility-Administered Medications   Medication Dose   • ipratropium-albuterol (COMBIVENT RESPIMAT) inhaler 1 Puff  1 Puff   • albuterol inhaler 2 Puff  2 Puff   • felodipine (PLENDIL) tablet 5 mg  5 mg   • allopurinol (ZYLOPRIM) tablet 100 mg  100 mg   • albuterol (PROVENTIL) 2.5mg/0.5ml nebulizer solution 2.5 mg  2.5 mg   • buPROPion SR (WELLBUTRIN-SR) tablet 150 mg  150 mg   • calcitRIOL (ROCALTROL) capsule 0.25 mcg  0.25 mcg   • rosuvastatin (CRESTOR) tablet 10 mg  10 mg   • acetaminophen (TYLENOL) tablet 1,000 mg  1,000 mg   • bisacodyl (DULCOLAX) suppository 10 mg  10 mg   • dexamethasone (DECADRON) injection 4 mg  4 mg   • diphenhydrAMINE (BENADRYL) injection 25 mg  25 mg   • docusate sodium (COLACE) capsule 100 mg  100 mg   • enoxaparin (LOVENOX) inj 30  mg  30 mg   • magnesium hydroxide (MILK OF MAGNESIA) suspension 30 mL  30 mL   • morphine (pf) 4 mg/ml injection 2 mg  2 mg   • ondansetron (ZOFRAN) syringe/vial injection 4 mg  4 mg   • oxyCODONE immediate-release (ROXICODONE) tablet 2.5 mg  2.5 mg   • oxyCODONE immediate-release (ROXICODONE) tablet 5 mg  5 mg   • scopolamine (TRANSDERM-SCOP) patch 1 Patch  1 Patch   • senna-docusate (PERICOLACE or SENOKOT S) 8.6-50 MG per tablet 1 Tab  1 Tab   • senna-docusate (PERICOLACE or SENOKOT S) 8.6-50 MG per tablet 1 Tab  1 Tab     Facility-Administered Medications Ordered in Other Encounters   Medication Dose   • bisacodyl (DULCOLAX) suppository 10 mg  10 mg   • dexamethasone (DECADRON) injection 4 mg  4 mg   • diphenhydrAMINE (BENADRYL) injection 25 mg  25 mg   • docusate sodium (COLACE) capsule 100 mg  100 mg   • fleet enema 133 mL  1 Each   • haloperidol lactate (HALDOL) injection 1 mg  1 mg   • ondansetron (ZOFRAN) syringe/vial injection 4 mg  4 mg   • polyethylene glycol/lytes (MIRALAX) PACKET 1 Packet  1 Packet   • scopolamine (TRANSDERM-SCOP) patch 1 Patch  1 Patch   • senna-docusate (PERICOLACE or SENOKOT S) 8.6-50 MG per tablet 1 Tab  1 Tab   • senna-docusate (PERICOLACE or SENOKOT S) 8.6-50 MG per tablet 1 Tab  1 Tab   • acetaminophen (TYLENOL) tablet 1,000 mg  1,000 mg   • morphine (pf) 4 mg/ml injection 2 mg  2 mg   • oxyCODONE immediate-release (ROXICODONE) tablet 2.5 mg  2.5 mg   • oxyCODONE immediate-release (ROXICODONE) tablet 5 mg  5 mg   • enoxaparin (LOVENOX) inj 30 mg  30 mg       Exam:  Breast Exam: Inspection negative. No nipple discharge or bleeding. No masses or nodularity palpable  Abdomen: Abdomen soft, non-tender. BS normal. No masses,  No organomegaly, positive findings:  Hypoactive , BS ., soft , no guarding , ridigity , no rebound , bandage dry ,   Fundus Non Tender: N\A  Incision: dry and intact, healing well with good reapproximation  Perineum: perineum intact  Extremity: extremities,  peripheral pulses and reflexes normal     Labs:         Activity:   Discharge to home  Resume At home meds , and Oxygen per nasal canula     Assessment:  {s/P Extensive PRISCILLA, WEST/ BSO   Follow up:  [facility] in ; 1 week for incision check.      Discharge Meds:   Current Outpatient Prescriptions   Medication Sig Dispense Refill   • oxyCODONE-acetaminophen (PERCOCET) 5-325 MG Tab Take 1-2 Tabs by mouth every four hours as needed for up to 4 days. 24 Tab 0   • metoclopramide (REGLAN) 10 MG Tab Take 1 Tab by mouth 4 times a day. 120 Tab 1    See At home med list as well         Henrry Centeno M.D.

## 2019-02-09 NOTE — DISCHARGE INSTRUCTIONS
Discharge Instructions    Discharged to home by car with relative. Discharged via wheelchair, hospital escort: Yes.  Special equipment needed: Not Applicable    Be sure to schedule a follow-up appointment with your primary care doctor or any specialists as instructed.     Discharge Plan:   Diet Plan: Discussed  Activity Level: Discussed  Confirmed Follow up Appointment: Appointment Scheduled  Confirmed Symptoms Management: Discussed  Medication Reconciliation Updated: No (Comments)  Influenza Vaccine Indication: Not indicated: Previously immunized this influenza season and > 8 years of age    I understand that a diet low in cholesterol, fat, and sodium is recommended for good health. Unless I have been given specific instructions below for another diet, I accept this instruction as my diet prescription.   Other diet: Regular     Special Instructions  may shower with bandage on , Needs F/U 7 days    · Is patient discharged on Warfarin / Coumadin?   No     Depression / Suicide Risk    As you are discharged from this [facility] facility, it is important to learn how to keep safe from harming yourself.    Recognize the warning signs:  · Abrupt changes in personality, positive or negative- including increase in energy   · Giving away possessions  · Change in eating patterns- significant weight changes-  positive or negative  · Change in sleeping patterns- unable to sleep or sleeping all the time   · Unwillingness or inability to communicate  · Depression  · Unusual sadness, discouragement and loneliness  · Talk of wanting to die  · Neglect of personal appearance   · Rebelliousness- reckless behavior  · Withdrawal from people/activities they love  · Confusion- inability to concentrate     If you or a loved one observes any of these behaviors or has concerns about self-harm, here's what you can do:  · Talk about it- your feelings and reasons for harming yourself  · Remove any means that you might use to hurt yourself  (examples: pills, rope, extension cords, firearm)  · Get professional help from the community (Mental Health, Substance Abuse, psychological counseling)  · Do not be alone:Call your Safe Contact- someone whom you trust who will be there for you.  · Call your local CRISIS HOTLINE 815-4774 or 767-140-1283  · Call your local Children's Mobile Crisis Response Team [place] (117) 586-6478 or www.[employer]  · Call the toll free National Suicide Prevention Hotlines   · National Suicide Prevention Lifeline 976-774-PDEO (1590)  · [employer] Line Network 800-SUICIDE (776-0143)    Hysterectomy Information  A hysterectomy is a surgery to remove your uterus. After surgery, you will no longer have periods. Also, you will not be able to get pregnant.  Reasons for this surgery  · You have bleeding that is not normal and keeps coming back.  · You have lasting (chronic) lower belly (pelvic) pain.  · You have a lasting infection.  · The lining of your uterus grows outside your uterus.  · The lining of your uterus grows in the muscle of your uterus.  · Your uterus falls down into your vagina.  · You have a growth in your uterus that causes problems.  · You have cells that could turn into cancer (precancerous cells).  · You have cancer of the uterus or cervix.  Types  There are 3 types of hysterectomies. Depending on the type, the surgery will:  · Remove the top part of the uterus only.  · Remove the uterus and the cervix.  · Remove the uterus, cervix, and tissue that holds the uterus in place in the lower belly.  Ways a hysterectomy can be performed  There are 5 ways this surgery can be performed.  · A cut (incision) is made in the belly (abdomen). The uterus is taken out through the cut.  · A cut is made in the vagina. The uterus is taken out through the cut.  · Three or four cuts are made in the belly. A surgical device with a camera is put through one of the cuts. The uterus is cut into small pieces. The uterus is  taken out through the cuts or the vagina.  · Three or four cuts are made in the belly. A surgical device with a camera is put through one of the cuts. The uterus is taken out through the vagina.  · Three or four cuts are made in the belly. A surgical device that is controlled by a computer makes a visual image. The device helps the surgeon control the surgical tools. The uterus is cut into small pieces. The pieces are taken out through the cuts or through the vagina.  What can I expect after the surgery?  · You will be given pain medicine.  · You will need help at home for 3-5 days after surgery.  · You will need to see your doctor in 2-4 weeks after surgery.  · You may get hot flashes, have night sweats, and have trouble sleeping.  · You may need to have Pap tests in the future if your surgery was related to cancer. Talk to your doctor. It is still good to have regular exams.  This information is not intended to replace advice given to you by your health care provider. Make sure you discuss any questions you have with your health care provider.  Document Released: 03/11/2013 Document Revised: 05/25/2017 Document Reviewed: 08/25/2014  [employer] Interactive Patient Education © 2017 [employer]      Unilateral Salpingo-Oophorectomy  Unilateral salpingo-oophorectomy is the surgical removal of one fallopian tube and ovary. The ovaries are small organs that produce eggs in women. The fallopian tubes transport the egg from the ovary to the womb (uterus). A unilateral salpingo-oophorectomy may be done for various reasons, including:  · Infection in the fallopian tube and ovary.  · Scar tissue in the fallopian tube and ovary (adhesions).  · A cyst or tumor on the ovary.  · A need to remove the fallopian tube and ovary when removing the uterus.  · Cancer of the fallopian tube or ovary.  The removal of one fallopian tube and ovary will not prevent you from becoming pregnant, put you into menopause, or cause problems with  your menstrual periods or sex drive.  LET YOUR HEALTH CARE PROVIDER KNOW ABOUT:  · Any allergies you have.  · All medicines you are taking, including vitamins, herbs, eye drops, creams, and over-the-counter medicines.  · Previous problems you or members of your family have had with the use of anesthetics.  · Any blood disorders you have.  · Previous surgeries you have had.  · Medical conditions you have.  RISKS AND COMPLICATIONS   Generally, this is a safe procedure. However, as with any procedure, complications can occur. Possible complications include:  · Injury to surrounding organs.  · Bleeding.  · Infection.  · Blood clots in the legs or lungs.  · Problems related to anesthesia.  BEFORE THE PROCEDURE  · Ask your health care provider about changing or stopping your regular medicines. You may need to stop taking certain medicines, such as aspirin or blood thinners, at least 1 week before the surgery.  · Do not eat or drink anything for at least 8 hours before the surgery.  · If you smoke, do not smoke for at least 2 weeks before the surgery.  · Make plans to have someone drive you home after the procedure or after your hospital stay. Also arrange for someone to help you with activities during recovery.  PROCEDURE  · You will be given medicine to help you relax before the procedure (sedative). You will then be given medicine to make you sleep through the procedure (general anesthetic). These medicines will be given through an IV access tube that is put into one of your veins.  · Once you are asleep, your lower abdomen will be shaved and cleaned. A thin, flexible tube (catheter) will be placed in your bladder.  · The surgeon may use a laparoscopic, robotic, or open technique for this surgery:  ¨ In the laparoscopic technique, the surgery is done through two small cuts (incisions) in the abdomen. A thin, lighted tube with a tiny camera on the end (laparoscope) is inserted into one of the incisions. The tools needed  for the procedure are put through the other incision.  ¨ A robotic technique may be chosen to perform complex surgery in a small space. In the robotic technique, small incisions are made. A camera and surgical instruments are passed through the incisions. Surgical instruments are controlled with the help of a robotic arm.  ¨ In the open technique, the surgery is done through one large incision in the abdomen.  · Using any of these techniques, the surgeon will remove the fallopian tube and ovary. The blood vessels will be clamped and tied.  · The surgeon will then use staples or stitches to close the incision or incisions.  AFTER THE PROCEDURE  · You will be taken to a recovery area where your progress will be monitored for 1-3 hours. Your blood pressure, pulse, and temperature will be checked often. You will remain in the recovery area until you are stable and waking up.  · If the laparoscopic technique was used, you may be allowed to go home after several hours. You may have some shoulder pain. This is normal and usually goes away in a day or two.  · If the open technique was used, you will be admitted to the hospital for a couple of days.  · You will be given pain medicine as necessary.  · The IV tube and catheter will be removed before you are discharged.  This information is not intended to replace advice given to you by your health care provider. Make sure you discuss any questions you have with your health care provider.  Document Released: 10/15/2010 Document Revised: 12/23/2014 Document Reviewed: 06/11/2014  [employer] Interactive Patient Education © 2017 [employer]  Exploratory Laparotomy, Adult  Introduction  Exploratory laparotomy is a surgical procedure to examine the organs inside your belly (abdomen). Another name for this is abdominal exploration. You may have this procedure if you have abdominal pain, trauma, bleeding, infection, or obstruction. The procedure may be done if your health care provider  cannot make a diagnosis from only an exam and testing.  Exploratory laparotomy may be a planned procedure or an emergency procedure. You may have surgical treatment as part of the laparotomy, or you may have additional treatment after your laparotomy. This will depend on what your surgeon finds during the procedure.  Tell a health care provider about:  · Any allergies you have.  · All medicines you are taking, including vitamins, herbs, eye drops, creams, and over-the-counter medicines.  · Any problems you or family members have had with anesthetic medicines.  · Any blood disorders you have.  · Any surgeries you have had.  · Any medical conditions you have.  What are the risks?  Generally, this is a safe procedure. However, problems can occur and include:  · Bleeding.  · Infection.  · A blood clot that forms in your leg and travels to your lungs.  · Damage to organs inside your abdomen.  · Scar tissue that blocks your digestive tract.  What happens before the procedure?  · Ask your health care provider about:  ¨ Changing or stopping your regular medicines. This is especially important if you are taking diabetes medicines or blood thinners.  ¨ Taking medicines such as aspirin and ibuprofen. These medicines can thin your blood. Do not take these medicines before your procedure if your health care provider instructs you not to.  · Do not eat or drink anything after midnight on the night before the procedure or as directed by your health care provider.  · You may be given instructions for clearing out your bowel before surgery (bowel prep). If you are already in the hospital, the bowel prep may be done there.  What happens during the procedure?  · An IV tube may be inserted into a vein. You may receive fluids and medicine through the IV tube. This may include antibiotic medicine to treat or prevent infection.  · You will be given a medicine that makes you go to sleep (general anesthetic).  · You may have a tube placed  through your nose and into your stomach (nasogastric tube) to drain your stomach fluids.  · You may have a tube placed into your bladder (urinary catheter) to drain urine.  · Your abdomen will be cleaned with a germ-killing solution (antiseptic).  · The surgeon will make a surgical cut (incision) in your abdomen. This is usually an up-and-down incision in the midsection of your abdomen. The incision will go through the inside lining of your abdomen (peritoneum).  · Your surgeon will spread the incision wide enough to examine the inside of your abdomen.  · The rest of the procedure will depend on what the surgeon finds:  ¨ The surgeon will check all organs in your abdomen for damage or obstruction. Repairs will be made when possible.  ¨ If there is blood in the abdomen, the surgeon will look for the source of the bleeding in order to stop it.  ¨ If there is yellowish-white fluid (pus) or gastric fluids in your abdomen, the surgeon will check for an infection or a hole (perforation) in your digestive tract.  ¨ If the surgeon finds infection, a drain may be placed to empty fluid that can build up in your abdomen after surgery.  ¨ If there is a growth (tumor) inside your abdomen, the surgeon may remove a piece of the growth (biopsy) to examine it under a microscope.  · When all procedures are complete, the surgeon will close your abdomen with layers of stitches (sutures).  · The incision through the skin of your abdomen will be closed with sutures or staples.  What happens after the procedure?  · Your blood pressure, heart rate, breathing rate, and blood oxygen level will be monitored often until the medicines you were given have worn off.  · You will continue to receive fluids and nutrition through your IV tube. This will stop when you can eat and drink on your own.  · You may also get antibiotic medicine and pain medicine through your IV tube.  · Your nasogastric tube may be removed when you start to pass gas.  · Your  urinary catheter may be removed when the anesthetic wears off.  This information is not intended to replace advice given to you by your health care provider. Make sure you discuss any questions you have with your health care provider.  Document Released: 09/12/2002 Document Revised: 05/25/2017 Document Reviewed: 08/05/2015  © 2017 [employer]

## 2019-02-11 ENCOUNTER — PATIENT OUTREACH (OUTPATIENT)
Dept: HEALTH INFORMATION MANAGEMENT | Facility: OTHER | Age: 69
End: 2019-02-11

## 2019-02-12 ENCOUNTER — PATIENT OUTREACH (OUTPATIENT)
Dept: HEALTH INFORMATION MANAGEMENT | Facility: OTHER | Age: 69
End: 2019-02-12

## 2019-02-15 ENCOUNTER — APPOINTMENT (OUTPATIENT)
Dept: OBGYN | Facility: CLINIC | Age: 69
End: 2019-02-15
Payer: MEDICARE

## 2019-02-18 DIAGNOSIS — M81.0 OSTEOPOROSIS, UNSPECIFIED OSTEOPOROSIS TYPE, UNSPECIFIED PATHOLOGICAL FRACTURE PRESENCE: ICD-10-CM

## 2019-02-18 DIAGNOSIS — I10 ESSENTIAL HYPERTENSION: ICD-10-CM

## 2019-02-21 ENCOUNTER — GYNECOLOGY VISIT (OUTPATIENT)
Dept: OBGYN | Facility: CLINIC | Age: 69
End: 2019-02-21
Payer: MEDICARE

## 2019-02-21 ENCOUNTER — TELEPHONE (OUTPATIENT)
Dept: MEDICAL GROUP | Facility: PHYSICIAN GROUP | Age: 69
End: 2019-02-21

## 2019-02-21 VITALS
SYSTOLIC BLOOD PRESSURE: 124 MMHG | DIASTOLIC BLOOD PRESSURE: 82 MMHG | HEIGHT: 64 IN | WEIGHT: 173 LBS | BODY MASS INDEX: 29.53 KG/M2

## 2019-02-21 DIAGNOSIS — Z90.710 STATUS POST TOTAL ABDOMINAL HYSTERECTOMY AND BILATERAL SALPINGO-OOPHORECTOMY (TAH-BSO): ICD-10-CM

## 2019-02-21 DIAGNOSIS — Z90.79 STATUS POST TOTAL ABDOMINAL HYSTERECTOMY AND BILATERAL SALPINGO-OOPHORECTOMY (TAH-BSO): ICD-10-CM

## 2019-02-21 DIAGNOSIS — Z90.722 STATUS POST TOTAL ABDOMINAL HYSTERECTOMY AND BILATERAL SALPINGO-OOPHORECTOMY (TAH-BSO): ICD-10-CM

## 2019-02-21 PROCEDURE — 99024 POSTOP FOLLOW-UP VISIT: CPT | Performed by: OBSTETRICS & GYNECOLOGY

## 2019-02-21 RX ORDER — RALOXIFENE HYDROCHLORIDE 60 MG/1
TABLET, FILM COATED ORAL
Qty: 90 TAB | Refills: 1 | Status: SHIPPED | OUTPATIENT
Start: 2019-02-21 | End: 2019-05-07 | Stop reason: SDUPTHER

## 2019-02-21 RX ORDER — ROSUVASTATIN CALCIUM 10 MG/1
TABLET, COATED ORAL
Qty: 90 TAB | Refills: 1 | Status: SHIPPED | OUTPATIENT
Start: 2019-02-21 | End: 2019-05-07 | Stop reason: SDUPTHER

## 2019-02-21 RX ORDER — FUROSEMIDE 20 MG/1
TABLET ORAL
Qty: 180 TAB | Refills: 0 | Status: SHIPPED | OUTPATIENT
Start: 2019-02-21 | End: 2019-05-07 | Stop reason: SDUPTHER

## 2019-02-21 RX ORDER — FELODIPINE 5 MG/1
TABLET, EXTENDED RELEASE ORAL
Qty: 180 TAB | Refills: 1 | Status: SHIPPED | OUTPATIENT
Start: 2019-02-21 | End: 2020-02-10

## 2019-02-21 NOTE — TELEPHONE ENCOUNTER
Pt has had OV within the 12 month protocol and lipid panel is current. 6 month supply sent to pharmacy.   Lab Results   Component Value Date/Time    CHOLSTRLTOT 199 10/30/2018 08:22 AM     (H) 10/30/2018 08:22 AM    HDL 60 10/30/2018 08:22 AM    TRIGLYCERIDE 165 (H) 10/30/2018 08:22 AM       Lab Results   Component Value Date/Time    SODIUM 139 01/14/2019 10:31 AM    SODIUM 139 01/14/2019 10:31 AM    POTASSIUM 4.3 01/14/2019 10:31 AM    POTASSIUM 4.2 01/14/2019 10:31 AM    CHLORIDE 102 01/14/2019 10:31 AM    CHLORIDE 102 01/14/2019 10:31 AM    CO2 27 01/14/2019 10:31 AM    CO2 27 01/14/2019 10:31 AM    GLUCOSE 96 01/14/2019 10:31 AM    GLUCOSE 96 01/14/2019 10:31 AM    BUN 16 01/14/2019 10:31 AM    BUN 16 01/14/2019 10:31 AM    CREATININE 1.54 (H) 01/14/2019 10:31 AM    CREATININE 1.60 (H) 01/14/2019 10:31 AM    CREATININE 1.50 (H) 03/27/2013 12:00 AM    BUNCREATRAT 16 08/22/2013 10:28 AM    BUNCREATRAT 21 03/27/2013 12:00 AM     Lab Results   Component Value Date/Time    ALKPHOSPHAT 97 08/11/2017 03:16 AM    ASTSGOT 24 08/11/2017 03:16 AM    ALTSGPT 36 08/11/2017 03:16 AM    TBILIRUBIN 0.3 08/11/2017 03:16 AM     Lab Results   Component Value Date/Time    CHOLSTRLTOT 199 10/30/2018 08:22 AM     (H) 10/30/2018 08:22 AM    HDL 60 10/30/2018 08:22 AM    TRIGLYCERIDE 165 (H) 10/30/2018 08:22 AM

## 2019-02-21 NOTE — PROGRESS NOTES
"Lauren Bowden  69 y.o.  returns to clinic today for postoperative visit following WEST/BSO, PRISCILLA for persistent right adnexal mass on 2019.  She reports that since being seen last she has been doing well from a post op standpoint but she was in the ER yesterday due to gallstones.  She was told by providers that she will need to have her gallbladder removed.  Has not yet made an appointment with that surgeon.  She is not longer taking any pain medications for her pelvic or incisional pain.  Reports it has been feeling fine.  She is participating in her usual activities but has followed the lifting precautions and has not yet resumed intercourse.  Denies vaginal bleeding, abnormal vaginal discharge, urinary symptoms, bowel complaints, or other complaints related to her surgery.    All other systems are reviewed and are negative.    PMH, PSH, SH, and FH reviewed with patient today - changes made in chart.    Vitals:    19 0909   BP: 124/82   Wt 173lbs, Ht 5'4\"  General appearance - healthy, alert, no distress  Skin - Skin color, texture, turgor normal. No rashes or lesions.  HEENT: Normocephalic. No masses, lesions, tenderness or abnormalities  Neck - Neck supple. No thyromegaly  Lungs - Non labored on RA  Heart -  Distal pulses intact  Abdomen - Abdomen soft, non-tender. BS normal. No masses,  Incision well approximated without any erythema, induration or drainage   Extremities - Extremities normal. No deformities, edema, or skin discoloration    Surgical pathology:  FINAL DIAGNOSIS:  A. Pelvic washing:         No malignancy identified.  B. Uterus, cervix, right fallopian tube and right ovary:         Cervix demonstrating no significant lesion.         Endometrium demonstrates atrophic cystic endometrium with no          evidence of a polyp, hyperplasia or malignancy identified.         Myometrium demonstrating leiomyomata without atypia.         Serosa demonstrating hemorrhagic adhesions.         " Right fallopian tube demonstrates no significant lesions.         Right ovary demonstrates no significant lesion.  C. Left broad ligament cyst:         Fallopian tube with paratubal cysts.    A/P:  Lauren Bowden  69 y.o.  here for post op appt following WEST/BSO/PRISCILLA for benign right ovarian mass who is doing well from a post op standpoint.  #Post op.  Meeting all milestones.  Pathology reviewed.  Discussed she needs to continue to follow lifting lifting precautions and pelvic rest.  Healthy diet, continued mild activity and adequate hydration discussed.   #Precautions reviewed.  #Recommend follow up in a month for 6 wk post op appt with pelvic exam.  MARIOLA

## 2019-02-22 ENCOUNTER — TELEPHONE (OUTPATIENT)
Dept: MEDICAL GROUP | Facility: PHYSICIAN GROUP | Age: 69
End: 2019-02-22

## 2019-02-22 NOTE — TELEPHONE ENCOUNTER
Patient went to [facility] and they found gallstones and wants the patient to have her gallbladder removed. Patient stated that [facility] already. I advised pt to call them regarding her gallbladder

## 2019-02-22 NOTE — TELEPHONE ENCOUNTER
Please find out from [facility] if we can order a portable concentrator based on Dr. Eastman evaluation at clinic visit on 10/11/18.  If that is acceptable, then patient does not need to come in for an appointment.  She does have an upcoming appointment with Dr. Bay on 5/13/19.

## 2019-02-22 NOTE — TELEPHONE ENCOUNTER
Patient called upset stating that she has been waiting for an order for a portable concentrator sent to [facility]. I advised patient that may have to come in so we can get current qualifing air sats. Patient got upset about coming in for appointment. I told her I would ask the doctor to see if its ok with no appointment

## 2019-02-26 NOTE — TELEPHONE ENCOUNTER
Enedina needs face to face appt within last 30 days.  Patient refuses to make an appt and states that insurance has already approved her for 2 years with [employer], she just wants to switch companies.  I let her know that in order for her to get started with [facility], they still need a face to face within 30 days.  Patient was upset and told me that I needed to send it to them anyways, that she was not going to make an appt.  I let her know that I would send it, but it is not guaranteed they will switch them without an appt. Orders filled out and placed in your inbasket.

## 2019-03-01 ENCOUNTER — TELEPHONE (OUTPATIENT)
Dept: MEDICAL GROUP | Facility: PHYSICIAN GROUP | Age: 69
End: 2019-03-01

## 2019-03-01 NOTE — TELEPHONE ENCOUNTER
Phone Number Called: [phone] (home)       Message: Spoke with patient and advised her that Jaleel from [employer] states that her O2 order is too old. Patient's Medicare insurance is requiring her to have a new order for her portable O2 tanks. Patient very upset stated that she has appointment after appointments this month of March. Patient is requesting a back to back appointment for her and her spouse to be seen. Patient states that she believes he need portable O2 tanks and that [facility] is handling his order. Advised patient that her PCP is out on maternity leave and the two other provider who are covering Dr. Bay's patient's are almost 100% booked. There is a chance that they may not be seen together. Patient will discuss this with spouse-unable to book appointment today 3/1/19.    Left Message for patient to call back: no

## 2019-03-12 ENCOUNTER — TELEPHONE (OUTPATIENT)
Dept: MEDICAL GROUP | Facility: PHYSICIAN GROUP | Age: 69
End: 2019-03-12

## 2019-03-13 NOTE — TELEPHONE ENCOUNTER
Phone Number Called: [phone]     Message: patient is upset that she hasn't heard about her O2. Emelia last spoke with the patient and told her that she needs an appt for a new order and the patient will call back for an appointment. Patient upset that she continues to get the run around. I scheduled an appt 3/26/18 at 4:20p    Left Message for patient to call back: N\A

## 2019-03-20 DIAGNOSIS — M54.9 OTHER CHRONIC BACK PAIN: ICD-10-CM

## 2019-03-20 DIAGNOSIS — G89.29 OTHER CHRONIC BACK PAIN: ICD-10-CM

## 2019-03-20 DIAGNOSIS — I25.10 CORONARY ARTERY DISEASE INVOLVING NATIVE CORONARY ARTERY OF NATIVE HEART WITHOUT ANGINA PECTORIS: ICD-10-CM

## 2019-03-21 RX ORDER — CYCLOBENZAPRINE HCL 10 MG
10 TABLET ORAL 3 TIMES DAILY PRN
Qty: 90 TAB | Refills: 0 | Status: SHIPPED | OUTPATIENT
Start: 2019-03-21 | End: 2019-07-31 | Stop reason: SDUPTHER

## 2019-03-21 RX ORDER — BUPROPION HYDROCHLORIDE 150 MG/1
TABLET, EXTENDED RELEASE ORAL
Qty: 90 TAB | Refills: 0 | Status: SHIPPED | OUTPATIENT
Start: 2019-03-21 | End: 2019-09-26 | Stop reason: SDUPTHER

## 2019-03-26 ENCOUNTER — OFFICE VISIT (OUTPATIENT)
Dept: MEDICAL GROUP | Facility: PHYSICIAN GROUP | Age: 69
End: 2019-03-26
Payer: MEDICARE

## 2019-03-26 VITALS
DIASTOLIC BLOOD PRESSURE: 78 MMHG | HEART RATE: 70 BPM | HEIGHT: 64 IN | OXYGEN SATURATION: 98 % | SYSTOLIC BLOOD PRESSURE: 120 MMHG | TEMPERATURE: 97.9 F | BODY MASS INDEX: 28.17 KG/M2 | RESPIRATION RATE: 20 BRPM | WEIGHT: 165 LBS

## 2019-03-26 DIAGNOSIS — R10.84 GENERALIZED ABDOMINAL PAIN: ICD-10-CM

## 2019-03-26 PROCEDURE — 99214 OFFICE O/P EST MOD 30 MIN: CPT | Performed by: NURSE PRACTITIONER

## 2019-03-26 ASSESSMENT — PAIN SCALES - GENERAL: PAINLEVEL: 2=MINIMAL-SLIGHT

## 2019-03-26 NOTE — ASSESSMENT & PLAN NOTE
I spoke with Mr. Alamo's wife, they are aware of stopping the ASA 325mg 5 days prior to procedure.  Pt is no longer on Coumadin.  No other questions at this time.  Call ended   Patient reports severe abdominal pain for last 3 days.  She had a laparoscopic cholecystectomy at [facility] on 3/7/19.  Patient reports she has had one bowel movement since then, approximately a week ago.  She has been passing a little bit of gas.  Denies fever, nausea, emesis.  Appetite has been decreased, though eating small bits.

## 2019-03-26 NOTE — PROGRESS NOTES
CC:  Abdominal pain    HISTORY OF THE PRESENT ILLNESS: Patient is a 69 y.o. female. This pleasant patient is here today for evaluation and management of the following health problems.  Patient is new to me.  Her primary care provider is Dr. Bay.      Generalized abdominal pain  Patient reports severe abdominal pain for last 3 days.  She had a laparoscopic cholecystectomy at [facility] on 3/7/19.  Patient reports she has had one bowel movement since then, approximately a week ago.  She has been passing a little bit of gas.  Denies fever, nausea, emesis.  Appetite has been decreased, though eating small bits.      Allergies: Patient has no known allergies.    Current Outpatient Prescriptions Ordered in [facility]   Medication Sig Dispense Refill   • cyclobenzaprine (FLEXERIL) 10 MG Tab TAKE 1 TAB BY MOUTH 3 TIMES A DAY AS NEEDED FOR MILD  PAIN. 90 Tab 0   • buPROPion SR (WELLBUTRIN-SR) 150 MG TABLET SR 12 HR sustained-release tablet TAKE 1 TABLET BY MOUTH  EVERY DAY 90 Tab 0   • felodipine (PLENDIL) 5 MG TABLET SR 24 HR TAKE 2 TABLETS BY MOUTH  EVERY  Tab 1   • rosuvastatin (CRESTOR) 10 MG Tab TAKE 1 TABLET BY MOUTH  EVERY EVENING 90 Tab 1   • raloxifene (EVISTA) 60 MG Tab TAKE 1 TABLET BY MOUTH  EVERY DAY 90 Tab 1   • furosemide (LASIX) 20 MG Tab TAKE 1 TABLET BY MOUTH TWO  TIMES DAILY 180 Tab 0   • metoclopramide (REGLAN) 10 MG Tab Take 1 Tab by mouth 4 times a day. 120 Tab 1   • calcitRIOL (ROCALTROL) 0.25 MCG Cap Take 1 Cap by mouth every 48 hours. 45 Cap 3   • ipratropium-albuterol (COMBIVENT RESPIMAT)  MCG/ACT Aero Soln Inhale 1 Puff by mouth 4 times a day. 3 Inhaler 3   • clopidogrel (PLAVIX) 75 MG Tab Take 1 Tab by mouth every day. 90 Tab 3   • omeprazole (PRILOSEC) 20 MG delayed-release capsule Take 1 Cap by mouth every day. 90 Cap 3   • allopurinol (ZYLOPRIM) 100 MG Tab Take 1 Tab by mouth every day. 90 Tab 3   • prednisoLONE acetate (PRED FORTE) 1 % Suspension Place 1 Drop in both  eyes 2 Times a Day.     • ketorolac (ACULAR) 0.5 % Solution Place 1 Drop in both eyes every day.     • albuterol (PROVENTIL) 2.5mg/3ml Nebu Soln solution for nebulization 3 mL by Nebulization route every four hours as needed for Shortness of Breath. 75 mL 11     Current Facility-Administered Medications Ordered in Epic   Medication Dose Route Frequency Provider Last Rate Last Dose   • bisacodyl (DULCOLAX) suppository 10 mg  10 mg Rectal Q24HRS PRN Henrry Centeno M.D.       • dexamethasone (DECADRON) injection 4 mg  4 mg Intravenous Once PRN Henrry Centeno M.D.       • diphenhydrAMINE (BENADRYL) injection 25 mg  25 mg Intravenous Q6HRS PRN Henrry Centeno M.D.       • docusate sodium (COLACE) capsule 100 mg  100 mg Oral BID Henrry Centeno M.D.       • fleet enema 133 mL  1 Each Rectal Once PRN Henrry Centeno M.D.       • haloperidol lactate (HALDOL) injection 1 mg  1 mg Intravenous Q6HRS PRN Henrry Centeno M.D.       • ondansetron (ZOFRAN) syringe/vial injection 4 mg  4 mg Intravenous Q4HRS PRN Henrry Centeno M.D.       • polyethylene glycol/lytes (MIRALAX) PACKET 1 Packet  1 Packet Oral BID PRN Henrry Centeno M.D.       • scopolamine (TRANSDERM-SCOP) patch 1 Patch  1 Patch Transdermal Q72HRS PRN Henrry Cenetno M.D.       • senna-docusate (PERICOLACE or SENOKOT S) 8.6-50 MG per tablet 1 Tab  1 Tab Oral Nightly Henrry Centeno M.D.       • senna-docusate (PERICOLACE or SENOKOT S) 8.6-50 MG per tablet 1 Tab  1 Tab Oral Q24HRS PRN Henrry Centeno M.D.       • morphine (pf) 4 mg/ml injection 2 mg  2 mg Intravenous Q3HRS PRN Henrry Centeno M.D.       • oxyCODONE immediate-release (ROXICODONE) tablet 2.5 mg  2.5 mg Oral Q3HRS PRN Henrry Centeno M.D.       • oxyCODONE immediate-release (ROXICODONE) tablet 5 mg  5 mg Oral Q3HRS PRN Henrry Centeno M.D.       • enoxaparin (LOVENOX) inj 30 mg  30 mg Subcutaneous Q12HRS Henrry Centeno M.D.           Past Medical History:   Diagnosis Date   • Arthritis      all over, history of gout both feet, great toes   • Asthma     daily inhalers   • Cataract    • CKD (chronic kidney disease) stage 4, GFR 15-29 ml/min ([facility]) 7/1/2013    Did see Dr. Perkins; current GFR 27 6/26/13; Has both kidneys, only one is functioning.   • COPD    • Dental disorder     lower partial   • Heart burn    • History of anemia    • Hypercholesteremia    • Hypertension    • Indigestion    • Ovarian neoplasm 8/10/2017    Incidental finding of the CT Renal  - 6.8 x 6.8 x 6 cm cystic left adnexal lesion may represent an ovarian cystadenoma or cystadenocarcinoma.     • Pneumonia ? 2012   • Pulmonary emphysema ([facility])     Oxygen as needed, through [employer], will be switching to [employer]   • Stroke ([facility]) 2016    pt states it was a TIA no residual on plavix   • Vitamin d deficiency 7/1/2013    Was taking 2000 - still 26; incr to 4000       Past Surgical History:   Procedure Laterality Date   • MINI LAPAROTOMY N/A 2/7/2019    Procedure: MINI LAPAROTOMY- EXPLORATORY;  Surgeon: Henrry Centeno M.D.;  Location: SURGERY SAME DAY [facility];  Service: Gynecology   • ABDOMINAL HYSTERECTOMY TOTAL N/A 2/7/2019    Procedure: ABDOMINAL HYSTERECTOMY TOTAL;  Surgeon: Henrry Centeno M.D.;  Location: SURGERY SAME DAY [facility];  Service: Gynecology   • SALPINGO OOPHORECTOMY Bilateral 2/7/2019    Procedure: SALPINGO OOPHORECTOMY;  Surgeon: Henrry Centeno M.D.;  Location: SURGERY SAME DAY [facility];  Service: Gynecology   • CATARACT PHACO WITH IOL Right 1/29/2019    Procedure: CATARACT PHACO WITH IOL;  Surgeon: Xander Hemphill M.D.;  Location: SURGERY SAME DAY [facility];  Service: Ophthalmology   • CATARACT PHACO WITH IOL Left 1/15/2019    Procedure: CATARACT PHACO WITH IOL;  Surgeon: Xander Hemphill M.D.;  Location: SURGERY SAME DAY [facility];  Service: Ophthalmology   • APPENDECTOMY      years ago, ?2000       Social History   Substance Use Topics   • Smoking status: Former Smoker     Packs/day: 1.00      "Years: 40.00     Types: Cigarettes     Quit date: 10/1/2010   • Smokeless tobacco: Never Used   • Alcohol use Yes      Comment: 2 a day       Family History   Problem Relation Age of Onset   • Diabetes Sister    • Cancer Sister        ROS:   As in HPI, otherwise negative for chest pain, dyspnea, abdominal pain, dysuria, blood in stool, fever           Exam: Blood pressure 120/78, pulse 70, temperature 36.6 °C (97.9 °F), temperature source Temporal, resp. rate 20, height 1.626 m (5' 4\"), weight 74.8 kg (165 lb), SpO2 98 %, not currently breastfeeding. Body mass index is 28.32 kg/m².    General: Alert, well nourished, well developed, distressed, visibly uncomfortable, female   Pulmonary: Clear to ausculation.  Normal effort. No rales, ronchi, or wheezing.  Cardiovascular: Normal rate and rhythm without murmur. No lower extremity edema.  Abdomen: Firm, severe tenderness to palpation throughout, distended.  Hypoactive bowel sounds.  Guarding with palpation.  Patient moaning throughout appointment.  Skin: Warm and dry, pale.  No obvious lesions.    Please note that this dictation was created using voice recognition software. I have made every reasonable attempt to correct obvious errors, but I expect that there are errors of grammar and possibly content that I did not discover before finalizing the note.      Assessment/Plan  1. Generalized abdominal pain  Explained to patient that because her pain is so severe, it would be best to be evaluated in ER.  Differentials include ileus, small bowel obstruction, constipation, perforation.  Patient is being transported to ER via EMS.  Report given to EMS.      "

## 2019-04-10 ENCOUNTER — OFFICE VISIT (OUTPATIENT)
Dept: MEDICAL GROUP | Facility: PHYSICIAN GROUP | Age: 69
End: 2019-04-10
Payer: MEDICARE

## 2019-04-10 VITALS
TEMPERATURE: 97.8 F | RESPIRATION RATE: 12 BRPM | SYSTOLIC BLOOD PRESSURE: 142 MMHG | BODY MASS INDEX: 27.49 KG/M2 | WEIGHT: 161 LBS | HEART RATE: 92 BPM | HEIGHT: 64 IN | DIASTOLIC BLOOD PRESSURE: 90 MMHG | OXYGEN SATURATION: 96 %

## 2019-04-10 DIAGNOSIS — J96.11 CHRONIC RESPIRATORY FAILURE WITH HYPOXIA (HCC): ICD-10-CM

## 2019-04-10 PROCEDURE — 99213 OFFICE O/P EST LOW 20 MIN: CPT | Performed by: NURSE PRACTITIONER

## 2019-04-10 ASSESSMENT — PAIN SCALES - GENERAL: PAINLEVEL: NO PAIN

## 2019-04-10 NOTE — NON-PROVIDER
Patient came in for walking O2 test. Patient was very irritated when I was going over the walking test with her. Patient was making very rude or shall I say inappropriate comments about having to do a re certification. Patient states that she just had one done in October. I just smiled and try to proceed with the test. As I was placing the O2 sensor on her finger she yanked her hand out of it. I held it to her finger again just in case she accidentally moved it. Patient began to curse and aggressively moved the sensor away from her. Patient cursing and mumbled something while walking down the cain to the exit. I was unable to proceed with the test. Advised PCP.

## 2019-04-12 NOTE — PROGRESS NOTES
CC: Chronic respiratory failure    HISTORY OF THE PRESENT ILLNESS: Patient is a 69 y.o. female. This pleasant patient is here today for evaluation management following health problems.  Patient's primary care provider is Dr. Bay.      Chronic respiratory failure with hypoxia (HCC)  Patient presents today for oxygen saturation testing in order to get portable oxygen.  Patient uses supplemental oxygen at home as needed.  She is changing oxygen companies.  Currently at [facility] and wanting to transfer to [facility].  Patient is frustrated because she has to have reevaluation for oxygen.  I did explain that we tried to order the oxygen tanks without having her come in for an appointment, but the oxygen company requires face-to-face assessment within 30 days of order for supplemental oxygen.  Unfortunately when medical assistant went to complete walking oxygen test, patient became very upset and walked out.  She walked out before I was able to complete physical assessment.      Allergies: Patient has no known allergies.    Current Outpatient Prescriptions Ordered in [facility]   Medication Sig Dispense Refill   • cyclobenzaprine (FLEXERIL) 10 MG Tab TAKE 1 TAB BY MOUTH 3 TIMES A DAY AS NEEDED FOR MILD  PAIN. 90 Tab 0   • buPROPion SR (WELLBUTRIN-SR) 150 MG TABLET SR 12 HR sustained-release tablet TAKE 1 TABLET BY MOUTH  EVERY DAY 90 Tab 0   • rosuvastatin (CRESTOR) 10 MG Tab TAKE 1 TABLET BY MOUTH  EVERY EVENING 90 Tab 1   • raloxifene (EVISTA) 60 MG Tab TAKE 1 TABLET BY MOUTH  EVERY DAY 90 Tab 1   • furosemide (LASIX) 20 MG Tab TAKE 1 TABLET BY MOUTH TWO  TIMES DAILY 180 Tab 0   • ketorolac (ACULAR) 0.5 % Solution Place 1 Drop in both eyes every day.     • ipratropium-albuterol (COMBIVENT RESPIMAT)  MCG/ACT Aero Soln Inhale 1 Puff by mouth 4 times a day. 3 Inhaler 3   • clopidogrel (PLAVIX) 75 MG Tab Take 1 Tab by mouth every day. 90 Tab 3   • omeprazole (PRILOSEC) 20 MG delayed-release capsule Take 1 Cap by mouth  every day. 90 Cap 3   • allopurinol (ZYLOPRIM) 100 MG Tab Take 1 Tab by mouth every day. 90 Tab 3   • felodipine (PLENDIL) 5 MG TABLET SR 24 HR TAKE 2 TABLETS BY MOUTH  EVERY  Tab 1   • prednisoLONE acetate (PRED FORTE) 1 % Suspension Place 1 Drop in both eyes 2 Times a Day.     • metoclopramide (REGLAN) 10 MG Tab Take 1 Tab by mouth 4 times a day. 120 Tab 1   • calcitRIOL (ROCALTROL) 0.25 MCG Cap Take 1 Cap by mouth every 48 hours. 45 Cap 3   • albuterol (PROVENTIL) 2.5mg/3ml Nebu Soln solution for nebulization 3 mL by Nebulization route every four hours as needed for Shortness of Breath. 75 mL 11     Current Facility-Administered Medications Ordered in Epic   Medication Dose Route Frequency Provider Last Rate Last Dose   • bisacodyl (DULCOLAX) suppository 10 mg  10 mg Rectal Q24HRS PRN Henrry Centeno M.D.       • dexamethasone (DECADRON) injection 4 mg  4 mg Intravenous Once PRN Henrry Centeno M.D.       • diphenhydrAMINE (BENADRYL) injection 25 mg  25 mg Intravenous Q6HRS PRN Henrry Centeno M.D.       • docusate sodium (COLACE) capsule 100 mg  100 mg Oral BID Henrry Centeno M.D.       • fleet enema 133 mL  1 Each Rectal Once PRN Henrry Centeno M.D.       • haloperidol lactate (HALDOL) injection 1 mg  1 mg Intravenous Q6HRS PRN Henrry Centeno M.D.       • ondansetron (ZOFRAN) syringe/vial injection 4 mg  4 mg Intravenous Q4HRS PRN Henrry Centeno M.D.       • polyethylene glycol/lytes (MIRALAX) PACKET 1 Packet  1 Packet Oral BID PRN Henrry Centeno M.D.       • scopolamine (TRANSDERM-SCOP) patch 1 Patch  1 Patch Transdermal Q72HRS PRN Henrry Centeno M.D.       • senna-docusate (PERICOLACE or SENOKOT S) 8.6-50 MG per tablet 1 Tab  1 Tab Oral Nightly Henrry Centeno M.D.       • senna-docusate (PERICOLACE or SENOKOT S) 8.6-50 MG per tablet 1 Tab  1 Tab Oral Q24HRS PRN Henrry Centeno M.D.       • morphine (pf) 4 mg/ml injection 2 mg  2 mg Intravenous Q3HRS PRN Henrry Centeno M.D.       •  oxyCODONE immediate-release (ROXICODONE) tablet 2.5 mg  2.5 mg Oral Q3HRS PRN Henrry Centeno M.D.       • oxyCODONE immediate-release (ROXICODONE) tablet 5 mg  5 mg Oral Q3HRS PRN Henrry Centeno M.D.       • enoxaparin (LOVENOX) inj 30 mg  30 mg Subcutaneous Q12HRS Henrry Centeno M.D.           Past Medical History:   Diagnosis Date   • Arthritis     all over, history of gout both feet, great toes   • Asthma     daily inhalers   • Cataract    • CKD (chronic kidney disease) stage 4, GFR 15-29 ml/min ([facility]) 7/1/2013    Did see Dr. Perkins; current GFR 27 6/26/13; Has both kidneys, only one is functioning.   • COPD    • Dental disorder     lower partial   • Heart burn    • History of anemia    • Hypercholesteremia    • Hypertension    • Indigestion    • Ovarian neoplasm 8/10/2017    Incidental finding of the CT Renal  - 6.8 x 6.8 x 6 cm cystic left adnexal lesion may represent an ovarian cystadenoma or cystadenocarcinoma.     • Pneumonia ? 2012   • Pulmonary emphysema (HCC)     Oxygen as needed, through [employer], will be switching to [employer]   • Stroke (HCC) 2016    pt states it was a TIA no residual on plavix   • Vitamin d deficiency 7/1/2013    Was taking 2000 - still 26; incr to 4000       Past Surgical History:   Procedure Laterality Date   • MINI LAPAROTOMY N/A 2/7/2019    Procedure: MINI LAPAROTOMY- EXPLORATORY;  Surgeon: Henrry Centeno M.D.;  Location: SURGERY SAME DAY [facility];  Service: Gynecology   • ABDOMINAL HYSTERECTOMY TOTAL N/A 2/7/2019    Procedure: ABDOMINAL HYSTERECTOMY TOTAL;  Surgeon: Henrry Centeno M.D.;  Location: SURGERY SAME DAY [facility];  Service: Gynecology   • SALPINGO OOPHORECTOMY Bilateral 2/7/2019    Procedure: SALPINGO OOPHORECTOMY;  Surgeon: Henrry Centeno M.D.;  Location: SURGERY SAME DAY [facility];  Service: Gynecology   • CATARACT PHACO WITH IOL Right 1/29/2019    Procedure: CATARACT PHACO WITH IOL;  Surgeon: Xander Hemphill M.D.;  Location: SURGERY SAME DAY  "[facility];  Service: Ophthalmology   • CATARACT PHACO WITH IOL Left 1/15/2019    Procedure: CATARACT PHACO WITH IOL;  Surgeon: Xander Hemphill M.D.;  Location: SURGERY SAME DAY [facility];  Service: Ophthalmology   • APPENDECTOMY      years ago, ?2000       Social History   Substance Use Topics   • Smoking status: Former Smoker     Packs/day: 1.00     Years: 40.00     Types: Cigarettes     Quit date: 10/1/2010   • Smokeless tobacco: Never Used   • Alcohol use 2.4 oz/week     2 Shots of liquor, 2 Cans of beer per week      Comment: 2 a day       Family History   Problem Relation Age of Onset   • Diabetes Sister    • Cancer Sister        ROS:   As in HPI, otherwise negative for chest pain, dyspnea, abdominal pain, dysuria, blood in stool, fever           Exam: /90 (BP Location: Left arm, Patient Position: Sitting, BP Cuff Size: Adult)   Pulse 92   Temp 36.6 °C (97.8 °F) (Temporal)   Resp 12   Ht 1.626 m (5' 4\")   Wt 73 kg (161 lb)   SpO2 96%  Body mass index is 27.64 kg/m².    General: Well nourished, well developed female in NAD  Pulmonary:   Normal effort.   Psych: Frustrated and irritable, alert and oriented  Physical exam not completed as patient left clinic suddenly.    Please note that this dictation was created using voice recognition software. I have made every reasonable attempt to correct obvious errors, but I expect that there are errors of grammar and possibly content that I did not discover before finalizing the note.      Assessment/Plan  1. Chronic respiratory failure with hypoxia (HCC)          "

## 2019-04-12 NOTE — ASSESSMENT & PLAN NOTE
Patient presents today for oxygen saturation testing in order to get portable oxygen.  Patient uses supplemental oxygen at home as needed.  She is changing oxygen companies.  Currently at [facility] and wanting to transfer to [facility].  Patient is frustrated because she has to have reevaluation for oxygen.  I did explain that we tried to order the oxygen tanks without having her come in for an appointment, but the oxygen company requires face-to-face assessment within 30 days of order for supplemental oxygen.  Unfortunately when medical assistant went to complete walking oxygen test, patient became very upset and walked out.  She walked out before I was able to complete physical assessment.

## 2019-05-03 DIAGNOSIS — I15.9 SECONDARY HYPERTENSION: ICD-10-CM

## 2019-05-03 DIAGNOSIS — K21.9 GERD WITHOUT ESOPHAGITIS: ICD-10-CM

## 2019-05-06 RX ORDER — CLOPIDOGREL BISULFATE 75 MG/1
TABLET ORAL
Qty: 90 TAB | Refills: 0 | Status: SHIPPED | OUTPATIENT
Start: 2019-05-06 | End: 2019-08-06 | Stop reason: SDUPTHER

## 2019-05-06 RX ORDER — ALLOPURINOL 100 MG/1
TABLET ORAL
Qty: 90 TAB | Refills: 0 | Status: SHIPPED | OUTPATIENT
Start: 2019-05-06 | End: 2019-09-26 | Stop reason: SDUPTHER

## 2019-05-06 RX ORDER — OMEPRAZOLE 20 MG/1
CAPSULE, DELAYED RELEASE ORAL
Qty: 90 CAP | Refills: 0 | Status: SHIPPED | OUTPATIENT
Start: 2019-05-06 | End: 2019-08-06 | Stop reason: SDUPTHER

## 2019-05-06 NOTE — TELEPHONE ENCOUNTER
Was the patient seen in the last year in this department? Yes    Does patient have an active prescription for medications requested? No     Received Request Via: Pharmacy      Pt met protocol?: Yes OV last month   Lab Results   Component Value Date/Time    CHOLSTRLTOT 199 10/30/2018 08:22 AM     (H) 10/30/2018 08:22 AM    HDL 60 10/30/2018 08:22 AM    TRIGLYCERIDE 165 (H) 10/30/2018 08:22 AM       Lab Results   Component Value Date/Time    SODIUM 139 01/14/2019 10:31 AM    SODIUM 139 01/14/2019 10:31 AM    POTASSIUM 4.3 01/14/2019 10:31 AM    POTASSIUM 4.2 01/14/2019 10:31 AM    CHLORIDE 102 01/14/2019 10:31 AM    CHLORIDE 102 01/14/2019 10:31 AM    CO2 27 01/14/2019 10:31 AM    CO2 27 01/14/2019 10:31 AM    GLUCOSE 96 01/14/2019 10:31 AM    GLUCOSE 96 01/14/2019 10:31 AM    BUN 16 01/14/2019 10:31 AM    BUN 16 01/14/2019 10:31 AM    CREATININE 1.54 (H) 01/14/2019 10:31 AM    CREATININE 1.60 (H) 01/14/2019 10:31 AM    CREATININE 1.50 (H) 03/27/2013 12:00 AM    BUNCREATRAT 16 08/22/2013 10:28 AM    BUNCREATRAT 21 03/27/2013 12:00 AM     Lab Results   Component Value Date/Time    ALKPHOSPHAT 97 08/11/2017 03:16 AM    ASTSGOT 24 08/11/2017 03:16 AM    ALTSGPT 36 08/11/2017 03:16 AM    TBILIRUBIN 0.3 08/11/2017 03:16 AM

## 2019-05-07 DIAGNOSIS — M81.0 OSTEOPOROSIS, UNSPECIFIED OSTEOPOROSIS TYPE, UNSPECIFIED PATHOLOGICAL FRACTURE PRESENCE: ICD-10-CM

## 2019-05-07 DIAGNOSIS — I10 ESSENTIAL HYPERTENSION: ICD-10-CM

## 2019-05-07 RX ORDER — ROSUVASTATIN CALCIUM 10 MG/1
TABLET, COATED ORAL
Qty: 90 TAB | Refills: 0 | Status: SHIPPED | OUTPATIENT
Start: 2019-05-07 | End: 2019-09-26 | Stop reason: SDUPTHER

## 2019-05-07 RX ORDER — FUROSEMIDE 20 MG/1
20 TABLET ORAL 2 TIMES DAILY
Qty: 180 TAB | Refills: 0 | Status: SHIPPED | OUTPATIENT
Start: 2019-05-07 | End: 2019-09-26 | Stop reason: SDUPTHER

## 2019-05-07 RX ORDER — RALOXIFENE HYDROCHLORIDE 60 MG/1
60 TABLET, FILM COATED ORAL
Qty: 90 TAB | Refills: 0 | Status: SHIPPED | OUTPATIENT
Start: 2019-05-07 | End: 2019-09-26 | Stop reason: SDUPTHER

## 2019-05-07 NOTE — TELEPHONE ENCOUNTER
Was the patient seen in the last year in this department? Yes    Does patient have an active prescription for medications requested? Yes    Received Request Via: Pharmacy      Pt met protocol?: Yes last ov 4/19  Last labs 1/19    Lab Results  Component Value Date/Time   CHOLSTRLTOT 199 10/30/2018 0822   TRIGLYCERIDE 165 (H) 10/30/2018 0822   HDL 60 10/30/2018 0822    (H) 10/30/2018 0822       BP Readings from Last 1 Encounters:   04/10/19 142/90

## 2019-05-10 RX ORDER — CALCITRIOL 0.25 UG/1
CAPSULE, LIQUID FILLED ORAL
Qty: 45 CAP | Refills: 3 | Status: SHIPPED | OUTPATIENT
Start: 2019-05-10 | End: 2021-04-15

## 2019-05-13 ENCOUNTER — OFFICE VISIT (OUTPATIENT)
Dept: MEDICAL GROUP | Facility: PHYSICIAN GROUP | Age: 69
End: 2019-05-13
Payer: MEDICARE

## 2019-05-13 VITALS
SYSTOLIC BLOOD PRESSURE: 124 MMHG | OXYGEN SATURATION: 93 % | WEIGHT: 164 LBS | BODY MASS INDEX: 28 KG/M2 | HEART RATE: 89 BPM | HEIGHT: 64 IN | DIASTOLIC BLOOD PRESSURE: 70 MMHG | TEMPERATURE: 97.9 F

## 2019-05-13 DIAGNOSIS — N83.8 OVARIAN MASS, LEFT: ICD-10-CM

## 2019-05-13 DIAGNOSIS — J43.8 OTHER EMPHYSEMA (HCC): ICD-10-CM

## 2019-05-13 DIAGNOSIS — Z23 NEED FOR VACCINATION: ICD-10-CM

## 2019-05-13 DIAGNOSIS — N18.30 STAGE 3 CHRONIC KIDNEY DISEASE (HCC): ICD-10-CM

## 2019-05-13 DIAGNOSIS — M25.512 ACUTE PAIN OF LEFT SHOULDER: ICD-10-CM

## 2019-05-13 PROCEDURE — 99214 OFFICE O/P EST MOD 30 MIN: CPT | Mod: 25 | Performed by: FAMILY MEDICINE

## 2019-05-13 PROCEDURE — G0009 ADMIN PNEUMOCOCCAL VACCINE: HCPCS | Performed by: FAMILY MEDICINE

## 2019-05-13 PROCEDURE — 90732 PPSV23 VACC 2 YRS+ SUBQ/IM: CPT | Performed by: FAMILY MEDICINE

## 2019-05-13 NOTE — ASSESSMENT & PLAN NOTE
Chronic condition stable.   Results for BEVERLY CAMPOS (MRN [id number]) as of 5/14/2019 13:49   Ref. Range 1/14/2019 10:31 1/14/2019 10:31   Creatinine Latest Ref Range: 0.50 - 1.40 mg/dL 1.54 (H) 1.60 (H)   GFR If  Latest Ref Range: >60 mL/min/1.73 m 2 40 (A) 39 (A)   GFR If Non  Latest Ref Range: >60 mL/min/1.73 m 2 33 (A) 32 (A)   Advised hydration, low salt diet, avoid NSAIDS.

## 2019-05-13 NOTE — ASSESSMENT & PLAN NOTE
Over back of left shoulder. She has no shortness of breath or chest pain. She has full ROM of shoulder.   More yard work lately has likely caused this.

## 2019-06-05 ENCOUNTER — OFFICE VISIT (OUTPATIENT)
Dept: MEDICAL GROUP | Facility: PHYSICIAN GROUP | Age: 69
End: 2019-06-05
Payer: MEDICARE

## 2019-06-05 VITALS
SYSTOLIC BLOOD PRESSURE: 124 MMHG | TEMPERATURE: 97 F | DIASTOLIC BLOOD PRESSURE: 78 MMHG | WEIGHT: 168 LBS | HEART RATE: 102 BPM | OXYGEN SATURATION: 94 % | HEIGHT: 64 IN | BODY MASS INDEX: 28.68 KG/M2 | RESPIRATION RATE: 16 BRPM

## 2019-06-05 DIAGNOSIS — J96.11 CHRONIC RESPIRATORY FAILURE WITH HYPOXIA (HCC): ICD-10-CM

## 2019-06-05 PROCEDURE — 99214 OFFICE O/P EST MOD 30 MIN: CPT | Performed by: FAMILY MEDICINE

## 2019-06-05 ASSESSMENT — PATIENT HEALTH QUESTIONNAIRE - PHQ9: CLINICAL INTERPRETATION OF PHQ2 SCORE: 0

## 2019-06-05 NOTE — ASSESSMENT & PLAN NOTE
Patient has chronic respiratory failure and is on oxygen intermittently for COPD.  She uses 2 L with exertion and at night with sleep.  Patient's oxygen testing today qualifies her for supplemental oxygen.  [facility]  Oxygen level dropped to 87% on room air with walking oxygen improved to 94% on 2 L

## 2019-06-05 NOTE — PROGRESS NOTES
Subjective:   Lauren Bowden is a 69 y.o. female here today for evaluation and management of:     Chronic respiratory failure with hypoxia (HCC)  Patient has chronic respiratory failure and is on oxygen intermittently for COPD.  She uses 2 L with exertion and at night with sleep.  Patient's oxygen testing today qualifies her for supplemental oxygen.  [facility].  Oxygen level dropped to 87% on room air with walking oxygen improved to 94% on 2 L     Addendum:   Oxygen level dropped to 87% on room air with walking. Patient was placed on 2L, walked again and oxygen improved to 94%    Current medicines (including changes today)  Current Outpatient Prescriptions   Medication Sig Dispense Refill   • calcitRIOL (ROCALTROL) 0.25 MCG Cap TAKE 1 CAPSULE BY MOUTH  EVERY 48 HOURS 45 Cap 3   • raloxifene (EVISTA) 60 MG Tab Take 1 Tab by mouth every day. 90 Tab 0   • furosemide (LASIX) 20 MG Tab Take 1 Tab by mouth 2 Times a Day. 180 Tab 0   • rosuvastatin (CRESTOR) 10 MG Tab TAKE 1 TABLET BY MOUTH  EVERY EVENING 90 Tab 0   • clopidogrel (PLAVIX) 75 MG Tab TAKE 1 TABLET BY MOUTH  EVERY DAY 90 Tab 0   • allopurinol (ZYLOPRIM) 100 MG Tab TAKE 1 TABLET BY MOUTH  EVERY DAY 90 Tab 0   • omeprazole (PRILOSEC) 20 MG delayed-release capsule TAKE 1 CAPSULE BY MOUTH  EVERY DAY 90 Cap 0   • cyclobenzaprine (FLEXERIL) 10 MG Tab TAKE 1 TAB BY MOUTH 3 TIMES A DAY AS NEEDED FOR MILD  PAIN. 90 Tab 0   • buPROPion SR (WELLBUTRIN-SR) 150 MG TABLET SR 12 HR sustained-release tablet TAKE 1 TABLET BY MOUTH  EVERY DAY 90 Tab 0   • felodipine (PLENDIL) 5 MG TABLET SR 24 HR TAKE 2 TABLETS BY MOUTH  EVERY  Tab 1   • ipratropium-albuterol (COMBIVENT RESPIMAT)  MCG/ACT Aero Soln Inhale 1 Puff by mouth 4 times a day. 3 Inhaler 3   • albuterol (PROVENTIL) 2.5mg/3ml Nebu Soln solution for nebulization 3 mL by Nebulization route every four hours as needed for Shortness of Breath. 75 mL 11     No current  facility-administered medications for this visit.      Facility-Administered Medications Ordered in Other Visits   Medication Dose Route Frequency Provider Last Rate Last Dose   • bisacodyl (DULCOLAX) suppository 10 mg  10 mg Rectal Q24HRS PRN Henrry Centeno M.D.       • dexamethasone (DECADRON) injection 4 mg  4 mg Intravenous Once PRN Henrry Centeno M.D.       • diphenhydrAMINE (BENADRYL) injection 25 mg  25 mg Intravenous Q6HRS PRN Henrry Centeno M.D.       • docusate sodium (COLACE) capsule 100 mg  100 mg Oral BID Henrry Centeno M.D.       • fleet enema 133 mL  1 Each Rectal Once PRN Henrry Centeno M.D.       • haloperidol lactate (HALDOL) injection 1 mg  1 mg Intravenous Q6HRS PRN Henrry Centeno M.D.       • ondansetron (ZOFRAN) syringe/vial injection 4 mg  4 mg Intravenous Q4HRS PRN Henrry Centeno M.D.       • polyethylene glycol/lytes (MIRALAX) PACKET 1 Packet  1 Packet Oral BID PRN Henrry Centeno M.D.       • scopolamine (TRANSDERM-SCOP) patch 1 Patch  1 Patch Transdermal Q72HRS PRN Henrry Centeno M.D.       • senna-docusate (PERICOLACE or SENOKOT S) 8.6-50 MG per tablet 1 Tab  1 Tab Oral Nightly Henrry Centeno M.D.       • senna-docusate (PERICOLACE or SENOKOT S) 8.6-50 MG per tablet 1 Tab  1 Tab Oral Q24HRS PRN Henrry Centeno M.D.       • morphine (pf) 4 mg/ml injection 2 mg  2 mg Intravenous Q3HRS PRN Henrry Centeno M.D.       • oxyCODONE immediate-release (ROXICODONE) tablet 2.5 mg  2.5 mg Oral Q3HRS PRN Henrry Centeno M.D.       • oxyCODONE immediate-release (ROXICODONE) tablet 5 mg  5 mg Oral Q3HRS PRN Henrry W. Humacao, M.D.       • enoxaparin (LOVENOX) inj 30 mg  30 mg Subcutaneous Q12HRS Henrry Centeno M.D.         She  has a past medical history of Arthritis; Asthma; Cataract; CKD (chronic kidney disease) stage 4, GFR 15-29 ml/min ([facility]) (7/1/2013); COPD; Dental disorder; Heart burn; History of anemia; Hypercholesteremia; Hypertension; Indigestion; Ovarian neoplasm  "(8/10/2017); Pneumonia (? 2012); Pulmonary emphysema (HCC); Stroke (HCC) (2016); and Vitamin d deficiency (7/1/2013).    ROS  No chest pain, no shortness of breath, no abdominal pain       Objective:     /78 (BP Location: Left arm, Patient Position: Sitting, BP Cuff Size: Adult)   Pulse (!) 102   Temp 36.1 °C (97 °F) (Temporal)   Resp 16   Ht 1.626 m (5' 4\")   Wt 76.2 kg (168 lb)   SpO2 94%  Body mass index is 28.84 kg/m².   Physical Exam:  Constitutional: Alert, no distress.  Skin: Warm, dry, good turgor, no rashes in visible areas.  Eye: Equal, round and reactive, conjunctiva clear, lids normal.  ENMT: Lips without lesions, good dentition, oropharynx clear.  Neck: Trachea midline, no masses, no thyromegaly. No cervical or supraclavicular lymphadenopathy  Respiratory: Unlabored respiratory effort, lungs clear to auscultation, no wheezes, no ronchi.  Cardiovascular: Normal S1, S2, no murmur, no edema.  Abdomen: Soft, non-tender, no masses, no hepatosplenomegaly.  Psych: Alert and oriented x3, normal affect and mood.        Assessment and Plan:   The following treatment plan was discussed    1. Chronic respiratory failure with hypoxia (HCC)  Order for oxygen done today.       Followup: Return for as scheduled in Nov.         "

## 2019-07-31 DIAGNOSIS — M54.9 OTHER CHRONIC BACK PAIN: ICD-10-CM

## 2019-07-31 DIAGNOSIS — G89.29 OTHER CHRONIC BACK PAIN: ICD-10-CM

## 2019-07-31 NOTE — TELEPHONE ENCOUNTER
Was the patient seen in the last year in this department? Yes    Does patient have an active prescription for medications requested? No     Received Request Via: Pharmacy    Pt met protocol?: Yes     Last OV 06/05/2019

## 2019-08-01 RX ORDER — CYCLOBENZAPRINE HCL 10 MG
TABLET ORAL
Qty: 90 TAB | Refills: 0 | Status: SHIPPED | OUTPATIENT
Start: 2019-08-01 | End: 2019-09-26 | Stop reason: SDUPTHER

## 2019-08-05 DIAGNOSIS — K21.9 GERD WITHOUT ESOPHAGITIS: ICD-10-CM

## 2019-08-05 DIAGNOSIS — I15.9 SECONDARY HYPERTENSION: ICD-10-CM

## 2019-08-05 NOTE — TELEPHONE ENCOUNTER
Was the patient seen in the last year in this department? Yes    Does patient have an active prescription for medications requested? No     Received Request Via: Pharmacy      Pt met protocol?: Yes    LAST OV 06/05/2019    BP Readings from Last 1 Encounters:   06/05/19 124/78        Lab Results   Component Value Date/Time    SODIUM 139 01/14/2019 10:31 AM    SODIUM 139 01/14/2019 10:31 AM    POTASSIUM 4.3 01/14/2019 10:31 AM    POTASSIUM 4.2 01/14/2019 10:31 AM    CHLORIDE 102 01/14/2019 10:31 AM    CHLORIDE 102 01/14/2019 10:31 AM    CO2 27 01/14/2019 10:31 AM    CO2 27 01/14/2019 10:31 AM    GLUCOSE 96 01/14/2019 10:31 AM    GLUCOSE 96 01/14/2019 10:31 AM    BUN 16 01/14/2019 10:31 AM    BUN 16 01/14/2019 10:31 AM    CREATININE 1.54 (H) 01/14/2019 10:31 AM    CREATININE 1.60 (H) 01/14/2019 10:31 AM    CREATININE 1.50 (H) 03/27/2013 12:00 AM    BUNCREATRAT 16 08/22/2013 10:28 AM    BUNCREATRAT 21 03/27/2013 12:00 AM

## 2019-08-06 RX ORDER — CLOPIDOGREL BISULFATE 75 MG/1
75 TABLET ORAL
Qty: 90 TAB | Refills: 1 | Status: SHIPPED | OUTPATIENT
Start: 2019-08-06 | End: 2021-06-02 | Stop reason: SDUPTHER

## 2019-08-06 RX ORDER — OMEPRAZOLE 20 MG/1
20 CAPSULE, DELAYED RELEASE ORAL
Qty: 90 CAP | Refills: 1 | Status: SHIPPED | OUTPATIENT
Start: 2019-08-06 | End: 2020-04-22

## 2019-08-21 ENCOUNTER — TELEPHONE (OUTPATIENT)
Dept: MEDICAL GROUP | Facility: PHYSICIAN GROUP | Age: 69
End: 2019-08-21

## 2019-08-21 NOTE — TELEPHONE ENCOUNTER
Lynne with [facility] called needing clarification.     I called [phone] and LVM for a call back

## 2019-08-22 ENCOUNTER — TELEPHONE (OUTPATIENT)
Dept: MEDICAL GROUP | Facility: PHYSICIAN GROUP | Age: 69
End: 2019-08-22

## 2019-08-22 NOTE — TELEPHONE ENCOUNTER
Received a call from [facility] regarding patient's O2. They need office visit 6/5/19 amended. The note must state: Oxygen level dropped to 87% on room air with walking. Patient was placed on 2L, walked again and oxygen improved to 94%.     Thanks!

## 2019-08-29 ENCOUNTER — TELEPHONE (OUTPATIENT)
Dept: MEDICAL GROUP | Facility: PHYSICIAN GROUP | Age: 69
End: 2019-08-29

## 2019-08-29 DIAGNOSIS — J96.11 CHRONIC RESPIRATORY FAILURE WITH HYPOXIA (HCC): ICD-10-CM

## 2019-09-19 ENCOUNTER — NON-PROVIDER VISIT (OUTPATIENT)
Dept: MEDICAL GROUP | Facility: PHYSICIAN GROUP | Age: 69
End: 2019-09-19
Payer: MEDICARE

## 2019-09-19 DIAGNOSIS — Z23 NEED FOR VACCINATION: ICD-10-CM

## 2019-09-19 DIAGNOSIS — Z23 NEED FOR INFLUENZA VACCINATION: ICD-10-CM

## 2019-09-19 PROCEDURE — G0008 ADMIN INFLUENZA VIRUS VAC: HCPCS | Performed by: FAMILY MEDICINE

## 2019-09-19 PROCEDURE — 90662 IIV NO PRSV INCREASED AG IM: CPT | Performed by: FAMILY MEDICINE

## 2019-09-26 DIAGNOSIS — M54.9 OTHER CHRONIC BACK PAIN: ICD-10-CM

## 2019-09-26 DIAGNOSIS — I25.10 CORONARY ARTERY DISEASE INVOLVING NATIVE CORONARY ARTERY OF NATIVE HEART WITHOUT ANGINA PECTORIS: ICD-10-CM

## 2019-09-26 DIAGNOSIS — G89.29 OTHER CHRONIC BACK PAIN: ICD-10-CM

## 2019-09-26 DIAGNOSIS — I10 ESSENTIAL HYPERTENSION: ICD-10-CM

## 2019-09-26 DIAGNOSIS — M81.0 OSTEOPOROSIS, UNSPECIFIED OSTEOPOROSIS TYPE, UNSPECIFIED PATHOLOGICAL FRACTURE PRESENCE: ICD-10-CM

## 2019-09-27 RX ORDER — CYCLOBENZAPRINE HCL 10 MG
TABLET ORAL
Qty: 90 TAB | Refills: 0 | Status: SHIPPED | OUTPATIENT
Start: 2019-09-27 | End: 2020-02-11

## 2019-09-27 RX ORDER — BUPROPION HYDROCHLORIDE 150 MG/1
TABLET, EXTENDED RELEASE ORAL
Qty: 90 TAB | Refills: 0 | Status: SHIPPED | OUTPATIENT
Start: 2019-09-27 | End: 2020-02-11

## 2019-09-27 RX ORDER — FUROSEMIDE 20 MG/1
TABLET ORAL
Qty: 180 TAB | Refills: 0 | Status: SHIPPED | OUTPATIENT
Start: 2019-09-27 | End: 2020-02-11

## 2019-09-27 RX ORDER — ROSUVASTATIN CALCIUM 10 MG/1
TABLET, COATED ORAL
Qty: 90 TAB | Refills: 0 | Status: SHIPPED | OUTPATIENT
Start: 2019-09-27 | End: 2020-02-11

## 2019-09-27 RX ORDER — ALLOPURINOL 100 MG/1
TABLET ORAL
Qty: 90 TAB | Refills: 0 | Status: SHIPPED | OUTPATIENT
Start: 2019-09-27 | End: 2020-02-11

## 2019-09-27 RX ORDER — RALOXIFENE HYDROCHLORIDE 60 MG/1
TABLET, FILM COATED ORAL
Qty: 90 TAB | Refills: 0 | Status: SHIPPED | OUTPATIENT
Start: 2019-09-27 | End: 2020-02-11

## 2019-09-27 NOTE — TELEPHONE ENCOUNTER
Was the patient seen in the last year in this department? Yes    Does patient have an active prescription for medications requested? No     Received Request Via: Pharmacy      Pt met protocol?: Yes, lipid labs 10/18, ov 6/19 bp 124/78    Ref Range & Units 11mo ago   Cholesterol,Tot 100 - 199 mg/dL 199    Triglycerides 0 - 149 mg/dL 165High     HDL >=40 mg/dL 60    LDL <100 mg/dL 106High     Resulting Agency

## 2019-11-26 ENCOUNTER — APPOINTMENT (OUTPATIENT)
Dept: RADIOLOGY | Facility: IMAGING CENTER | Age: 69
End: 2019-11-26
Attending: FAMILY MEDICINE
Payer: MEDICARE

## 2019-11-26 ENCOUNTER — OFFICE VISIT (OUTPATIENT)
Dept: MEDICAL GROUP | Facility: PHYSICIAN GROUP | Age: 69
End: 2019-11-26
Payer: MEDICARE

## 2019-11-26 VITALS
OXYGEN SATURATION: 87 % | TEMPERATURE: 99 F | DIASTOLIC BLOOD PRESSURE: 74 MMHG | SYSTOLIC BLOOD PRESSURE: 128 MMHG | HEIGHT: 65 IN | WEIGHT: 171 LBS | HEART RATE: 96 BPM | BODY MASS INDEX: 28.49 KG/M2 | RESPIRATION RATE: 16 BRPM

## 2019-11-26 DIAGNOSIS — J44.1 COPD EXACERBATION (HCC): ICD-10-CM

## 2019-11-26 PROCEDURE — 71046 X-RAY EXAM CHEST 2 VIEWS: CPT | Mod: TC,FY | Performed by: FAMILY MEDICINE

## 2019-11-26 PROCEDURE — 99214 OFFICE O/P EST MOD 30 MIN: CPT | Performed by: FAMILY MEDICINE

## 2019-11-26 RX ORDER — AZITHROMYCIN 250 MG/1
TABLET, FILM COATED ORAL
Qty: 6 TAB | Refills: 0 | Status: SHIPPED
Start: 2019-11-26 | End: 2020-03-04

## 2019-11-26 RX ORDER — METHYLPREDNISOLONE 4 MG/1
TABLET ORAL
Qty: 21 TAB | Refills: 0 | Status: SHIPPED
Start: 2019-11-26 | End: 2020-03-04

## 2019-11-26 NOTE — ASSESSMENT & PLAN NOTE
Shortness of breath, oxygen down to 87% on RA, 96% on 2L  Has oxygen at home, has portable but did not know how to use it, has a person coming in to help with using it.   Has a nebulizer at home.   Been feeling more short of breath and headaches. No fevers, chills, vomiting or diarrhea, has some intermittent cough with some phlem.   Will check cxr, pt encouraged to use oxygen.   rx for steroid burst and azythromycin.   ER precautions discussed if symptoms worsen

## 2019-11-27 NOTE — PROGRESS NOTES
Subjective:   Lauren Bowden is a 69 y.o. female here today for evaluation and management of:     COPD exacerbation (HCC)  Shortness of breath, oxygen down to 87% on RA, 96% on 2L  Has oxygen at home, has portable but did not know how to use it, has a person coming in to help with using it.   Has a nebulizer at home.   Been feeling more short of breath and headaches. No fevers, chills, vomiting or diarrhea, has some intermittent cough with some phlem.   Will check cxr, pt encouraged to use oxygen.   rx for steroid burst and azythromycin.   ER precautions discussed if symptoms worsen         Current medicines (including changes today)  Current Outpatient Medications   Medication Sig Dispense Refill   • methylPREDNISolone (MEDROL DOSEPAK) 4 MG Tablet Therapy Pack As directed on the packaging label. 21 Tab 0   • azithromycin (ZITHROMAX) 250 MG Tab Two tablets day one then one tablet daily for days 2-5 6 Tab 0   • allopurinol (ZYLOPRIM) 100 MG Tab TAKE 1 TABLET BY MOUTH  EVERY DAY 90 Tab 0   • buPROPion SR (WELLBUTRIN-SR) 150 MG TABLET SR 12 HR sustained-release tablet TAKE 1 TABLET BY MOUTH  EVERY DAY 90 Tab 0   • furosemide (LASIX) 20 MG Tab TAKE 1 TABLET BY MOUTH TWO  TIMES DAILY 180 Tab 0   • rosuvastatin (CRESTOR) 10 MG Tab TAKE 1 TABLET BY MOUTH  EVERY EVENING 90 Tab 0   • cyclobenzaprine (FLEXERIL) 10 MG Tab TAKE 1 TABLET BY MOUTH 3  TIMES DAILY AS NEEDED FOR  MILD PAIN. 90 Tab 0   • raloxifene (EVISTA) 60 MG Tab TAKE 1 TABLET BY MOUTH  EVERY DAY 90 Tab 0   • Misc. Devices Misc Portable oxygen concentrator: use daily. 1 Each 0   • clopidogrel (PLAVIX) 75 MG Tab Take 1 Tab by mouth every day. 90 Tab 1   • omeprazole (PRILOSEC) 20 MG delayed-release capsule Take 1 Cap by mouth every day. 90 Cap 1   • calcitRIOL (ROCALTROL) 0.25 MCG Cap TAKE 1 CAPSULE BY MOUTH  EVERY 48 HOURS 45 Cap 3   • felodipine (PLENDIL) 5 MG TABLET SR 24 HR TAKE 2 TABLETS BY MOUTH  EVERY  Tab 1   • ipratropium-albuterol (COMBIVENT  RESPIMAT)  MCG/ACT Aero Soln Inhale 1 Puff by mouth 4 times a day. 3 Inhaler 3   • albuterol (PROVENTIL) 2.5mg/3ml Nebu Soln solution for nebulization 3 mL by Nebulization route every four hours as needed for Shortness of Breath. 75 mL 11     No current facility-administered medications for this visit.      Facility-Administered Medications Ordered in Other Visits   Medication Dose Route Frequency Provider Last Rate Last Dose   • bisacodyl (DULCOLAX) suppository 10 mg  10 mg Rectal Q24HRS PRN Henrry Centeno M.D.       • dexamethasone (DECADRON) injection 4 mg  4 mg Intravenous Once PRN Henrry Centeno M.D.       • diphenhydrAMINE (BENADRYL) injection 25 mg  25 mg Intravenous Q6HRS PRN Henrry Centeno M.D.       • docusate sodium (COLACE) capsule 100 mg  100 mg Oral BID Henrry Centeno M.D.       • fleet enema 133 mL  1 Each Rectal Once PRN Henrry Centeno M.D.       • haloperidol lactate (HALDOL) injection 1 mg  1 mg Intravenous Q6HRS PRN Henrry Centeno M.D.       • ondansetron (ZOFRAN) syringe/vial injection 4 mg  4 mg Intravenous Q4HRS PRN Henrry Centeno M.D.       • polyethylene glycol/lytes (MIRALAX) PACKET 1 Packet  1 Packet Oral BID PRN Henrry Centeno M.D.       • scopolamine (TRANSDERM-SCOP) patch 1 Patch  1 Patch Transdermal Q72HRS PRN Henrry Centeno M.D.       • senna-docusate (PERICOLACE or SENOKOT S) 8.6-50 MG per tablet 1 Tab  1 Tab Oral Nightly Henrry Centeno M.D.       • senna-docusate (PERICOLACE or SENOKOT S) 8.6-50 MG per tablet 1 Tab  1 Tab Oral Q24HRS PRN Henrry Centeno M.D.       • morphine (pf) 4 mg/ml injection 2 mg  2 mg Intravenous Q3HRS PRN Henrry W. Shackelford, M.D.       • oxyCODONE immediate-release (ROXICODONE) tablet 2.5 mg  2.5 mg Oral Q3HRS PRN Henrry Centeno M.D.       • oxyCODONE immediate-release (ROXICODONE) tablet 5 mg  5 mg Oral Q3HRS PRN Henrry Centeno M.D.       • enoxaparin (LOVENOX) inj 30 mg  30 mg Subcutaneous Q12HRS Henrry Centeno M.D.         She  " has a past medical history of Arthritis, Asthma, Cataract, CKD (chronic kidney disease) stage 4, GFR 15-29 ml/min ([facility]) (7/1/2013), COPD, Dental disorder, Heart burn, History of anemia, Hypercholesteremia, Hypertension, Indigestion, Ovarian neoplasm (8/10/2017), Pneumonia (? 2012), Pulmonary emphysema (HCC), Stroke ([facility]) (2016), and Vitamin d deficiency (7/1/2013).    ROS  No chest pain, no shortness of breath, no abdominal pain       Objective:     /74 (BP Location: Left arm, Patient Position: Sitting, BP Cuff Size: Adult)   Pulse 96   Temp 37.2 °C (99 °F) (Temporal)   Resp 16   Ht 1.651 m (5' 5\")   Wt 77.6 kg (171 lb)   SpO2 (!) 87%  Body mass index is 28.46 kg/m².   Physical Exam:  Constitutional: Alert, no distress.  Skin: Warm, dry, good turgor, no rashes in visible areas.  Eye: Equal, round and reactive, conjunctiva clear, lids normal.  ENMT: Lips without lesions, good dentition, oropharynx clear.  Neck: Trachea midline, no masses, no thyromegaly. No cervical or supraclavicular lymphadenopathy  Respiratory: Unlabored respiratory effort, lungs clear to auscultation, no wheezes, no ronchi.  Cardiovascular: Normal S1, S2, no murmur, no edema.  Abdomen: Soft, non-tender, no masses, no hepatosplenomegaly.  Psych: Alert and oriented x3, normal affect and mood.        Assessment and Plan:   The following treatment plan was discussed    1. COPD exacerbation (HCC)  Medrol dose pack  Azithromycin  ER precautions  Continue nebulizer and oxygen supplementation at home.  - DX-CHEST-2 VIEWS; Future      Followup: Return in about 3 months (around 2/26/2020) for copd.         "

## 2019-12-02 DIAGNOSIS — Z72.0 TOBACCO USE: ICD-10-CM

## 2020-02-08 DIAGNOSIS — I10 ESSENTIAL HYPERTENSION: ICD-10-CM

## 2020-02-08 DIAGNOSIS — G89.29 OTHER CHRONIC BACK PAIN: ICD-10-CM

## 2020-02-08 DIAGNOSIS — M81.0 OSTEOPOROSIS, UNSPECIFIED OSTEOPOROSIS TYPE, UNSPECIFIED PATHOLOGICAL FRACTURE PRESENCE: ICD-10-CM

## 2020-02-08 DIAGNOSIS — I25.10 CORONARY ARTERY DISEASE INVOLVING NATIVE CORONARY ARTERY OF NATIVE HEART WITHOUT ANGINA PECTORIS: ICD-10-CM

## 2020-02-08 DIAGNOSIS — M54.9 OTHER CHRONIC BACK PAIN: ICD-10-CM

## 2020-02-10 NOTE — TELEPHONE ENCOUNTER
Was the patient seen in the last year in this department? Yes    Does patient have an active prescription for medications requested? No     Received Request Via: Pharmacy      Pt met protocol?: Yes, OV 11/19    BP Readings from Last 1 Encounters:   11/26/19 128/74     Lab Results   Component Value Date/Time    CHOLSTRLTOT 199 10/30/2018 08:22 AM     (H) 10/30/2018 08:22 AM    HDL 60 10/30/2018 08:22 AM    TRIGLYCERIDE 165 (H) 10/30/2018 08:22 AM       Lab Results   Component Value Date/Time    SODIUM 139 01/14/2019 10:31 AM    SODIUM 139 01/14/2019 10:31 AM    POTASSIUM 4.3 01/14/2019 10:31 AM    POTASSIUM 4.2 01/14/2019 10:31 AM    CHLORIDE 102 01/14/2019 10:31 AM    CHLORIDE 102 01/14/2019 10:31 AM    CO2 27 01/14/2019 10:31 AM    CO2 27 01/14/2019 10:31 AM    GLUCOSE 96 01/14/2019 10:31 AM    GLUCOSE 96 01/14/2019 10:31 AM    BUN 16 01/14/2019 10:31 AM    BUN 16 01/14/2019 10:31 AM    CREATININE 1.54 (H) 01/14/2019 10:31 AM    CREATININE 1.60 (H) 01/14/2019 10:31 AM    CREATININE 1.50 (H) 03/27/2013 12:00 AM    BUNCREATRAT 16 08/22/2013 10:28 AM    BUNCREATRAT 21 03/27/2013 12:00 AM     Lab Results   Component Value Date/Time    ALKPHOSPHAT 97 08/11/2017 03:16 AM    ASTSGOT 24 08/11/2017 03:16 AM    ALTSGPT 36 08/11/2017 03:16 AM    TBILIRUBIN 0.3 08/11/2017 03:16 AM

## 2020-02-11 RX ORDER — ROSUVASTATIN CALCIUM 10 MG/1
TABLET, COATED ORAL
Qty: 90 TAB | Refills: 0 | Status: SHIPPED | OUTPATIENT
Start: 2020-02-11 | End: 2020-04-23

## 2020-02-11 RX ORDER — ALLOPURINOL 100 MG/1
TABLET ORAL
Qty: 90 TAB | Refills: 0 | Status: SHIPPED | OUTPATIENT
Start: 2020-02-11 | End: 2020-03-04

## 2020-02-11 RX ORDER — BUPROPION HYDROCHLORIDE 150 MG/1
TABLET, EXTENDED RELEASE ORAL
Qty: 90 TAB | Refills: 0 | Status: SHIPPED | OUTPATIENT
Start: 2020-02-11 | End: 2020-04-23

## 2020-02-11 RX ORDER — FELODIPINE 5 MG/1
TABLET, EXTENDED RELEASE ORAL
Qty: 180 TAB | Refills: 0 | Status: SHIPPED | OUTPATIENT
Start: 2020-02-11 | End: 2020-04-23

## 2020-02-11 RX ORDER — CYCLOBENZAPRINE HCL 10 MG
TABLET ORAL
Qty: 90 TAB | Refills: 0 | Status: SHIPPED | OUTPATIENT
Start: 2020-02-11 | End: 2020-08-12

## 2020-02-11 RX ORDER — FUROSEMIDE 20 MG/1
TABLET ORAL
Qty: 180 TAB | Refills: 0 | Status: SHIPPED | OUTPATIENT
Start: 2020-02-11 | End: 2020-04-23

## 2020-02-11 RX ORDER — RALOXIFENE HYDROCHLORIDE 60 MG/1
TABLET, FILM COATED ORAL
Qty: 90 TAB | Refills: 0 | Status: SHIPPED | OUTPATIENT
Start: 2020-02-11 | End: 2020-04-23

## 2020-03-04 ENCOUNTER — OFFICE VISIT (OUTPATIENT)
Dept: MEDICAL GROUP | Facility: PHYSICIAN GROUP | Age: 70
End: 2020-03-04
Payer: MEDICARE

## 2020-03-04 VITALS
WEIGHT: 184 LBS | RESPIRATION RATE: 16 BRPM | HEART RATE: 74 BPM | OXYGEN SATURATION: 91 % | HEIGHT: 65 IN | BODY MASS INDEX: 30.66 KG/M2 | SYSTOLIC BLOOD PRESSURE: 132 MMHG | DIASTOLIC BLOOD PRESSURE: 84 MMHG | TEMPERATURE: 97.3 F

## 2020-03-04 DIAGNOSIS — Z12.39 BREAST CANCER SCREENING: ICD-10-CM

## 2020-03-04 DIAGNOSIS — E78.5 DYSLIPIDEMIA: ICD-10-CM

## 2020-03-04 DIAGNOSIS — I10 ESSENTIAL HYPERTENSION: ICD-10-CM

## 2020-03-04 DIAGNOSIS — E66.9 OBESITY (BMI 30-39.9): ICD-10-CM

## 2020-03-04 DIAGNOSIS — J96.11 CHRONIC RESPIRATORY FAILURE WITH HYPOXIA (HCC): ICD-10-CM

## 2020-03-04 DIAGNOSIS — N18.30 STAGE 3 CHRONIC KIDNEY DISEASE: ICD-10-CM

## 2020-03-04 DIAGNOSIS — Z11.59 ENCOUNTER FOR HEPATITIS C SCREENING TEST FOR LOW RISK PATIENT: ICD-10-CM

## 2020-03-04 DIAGNOSIS — Z12.11 COLON CANCER SCREENING: ICD-10-CM

## 2020-03-04 PROBLEM — J43.8 OTHER EMPHYSEMA (HCC): Status: RESOLVED | Noted: 2018-07-11 | Resolved: 2020-03-04

## 2020-03-04 PROBLEM — J44.1 COPD EXACERBATION (HCC): Status: RESOLVED | Noted: 2019-11-26 | Resolved: 2020-03-04

## 2020-03-04 PROCEDURE — 99214 OFFICE O/P EST MOD 30 MIN: CPT | Performed by: FAMILY MEDICINE

## 2020-03-04 ASSESSMENT — PATIENT HEALTH QUESTIONNAIRE - PHQ9: CLINICAL INTERPRETATION OF PHQ2 SCORE: 0

## 2020-03-04 NOTE — ASSESSMENT & PLAN NOTE
Underlying copd  Continues on oxygen supplementation during the day and night 2L via nasal cannula.

## 2020-03-04 NOTE — ASSESSMENT & PLAN NOTE
Stable,   Repeat labs ordered  Encouraged to stay well hydrated 64oz daily  HTN controlled  Stopped smoking about 5 years ago  Encouraged to avoid NSAIDS and salty foods.

## 2020-03-04 NOTE — PATIENT INSTRUCTIONS
Fasting labs before next visit with nephrologist or Dr. Bay    Mediterranean Diet  A Mediterranean diet refers to food and lifestyle choices that are based on the traditions of countries located on the [place]. This way of eating has been shown to help prevent certain conditions and improve outcomes for people who have chronic diseases, like kidney disease and heart disease.  What are tips for following this plan?  Lifestyle  · Cook and eat meals together with your family, when possible.  · Drink enough fluid to keep your urine clear or pale yellow.  · Be physically active every day. This includes:  ¨ Aerobic exercise like running or swimming.  ¨ Leisure activities like gardening, walking, or housework.  · Get 7-8 hours of sleep each night.  · If recommended by your health care provider, drink red wine in moderation. This means 1 glass a day for nonpregnant women and 2 glasses a day for men. A glass of wine equals 5 oz (150 mL).  Reading food labels  · Check the serving size of packaged foods. For foods such as rice and pasta, the serving size refers to the amount of cooked product, not dry.  · Check the total fat in packaged foods. Avoid foods that have saturated fat or trans fats.  · Check the ingredients list for added sugars, such as corn syrup.  Shopping  · At the grocery store, buy most of your food from the areas near the walls of the store. This includes:  ¨ Fresh fruits and vegetables (produce).  ¨ Grains, beans, nuts, and seeds. Some of these may be available in unpackaged forms or large amounts (in bulk).  ¨ Fresh seafood.  ¨ Poultry and eggs.  ¨ Low-fat dairy products.  · Buy whole ingredients instead of prepackaged foods.  · Buy fresh fruits and vegetables in-season from local farmers markets.  · Buy frozen fruits and vegetables in resealable bags.  · If you do not have access to quality fresh seafood, buy precooked frozen shrimp or canned fish, such as tuna, salmon, or sardines.  · Buy  small amounts of raw or cooked vegetables, salads, or olives from the deli or salad bar at your store.  · Stock your pantry so you always have certain foods on hand, such as olive oil, canned tuna, canned tomatoes, rice, pasta, and beans.  Cooking  · Cook foods with extra-virgin olive oil instead of using butter or other vegetable oils.  · Have meat as a side dish, and have vegetables or grains as your main dish. This means having meat in small portions or adding small amounts of meat to foods like pasta or stew.  · Use beans or vegetables instead of meat in common dishes like chili or lasagna.  · [place] with different cooking methods. Try roasting or broiling vegetables instead of steaming or sautéeing them.  · Add frozen vegetables to soups, stews, pasta, or rice.  · Add nuts or seeds for added healthy fat at each meal. You can add these to yogurt, salads, or vegetable dishes.  · Marinate fish or vegetables using olive oil, lemon juice, garlic, and fresh herbs.  Meal planning  · Plan to eat 1 vegetarian meal one day each week. Try to work up to 2 vegetarian meals, if possible.  · Eat seafood 2 or more times a week.  · Have healthy snacks readily available, such as:  ¨ Vegetable sticks with hummus.  ¨ Greek yogurt.  ¨ Fruit and nut trail mix.  · Eat balanced meals throughout the week. This includes:  ¨ Fruit: 2-3 servings a day  ¨ Vegetables: 4-5 servings a day  ¨ Low-fat dairy: 2 servings a day  ¨ Fish, poultry, or lean meat: 1 serving a day  ¨ Beans and legumes: 2 or more servings a week  ¨ Nuts and seeds: 1-2 servings a day  ¨ Whole grains: 6-8 servings a day  ¨ Extra-virgin olive oil: 3-4 servings a day  · Limit red meat and sweets to only a few servings a month  What are my food choices?  · Mediterranean diet  ¨ Recommended  ¨ Grains: Whole-grain pasta. Brown rice. Bulgar wheat. Polenta. Couscous. Whole-wheat bread. Oatmeal. Quinoa.  ¨ Vegetables: Artichokes. Beets. Broccoli. Cabbage. Carrots. Eggplant.  Green beans. Chard. Kale. Spinach. Onions. Leeks. Peas. Squash. Tomatoes. Peppers. Radishes.  ¨ Fruits: Apples. Apricots. Avocado. Berries. Bananas. Cherries. Dates. Figs. Grapes. Luis. Melon. Oranges. Peaches. Plums. Pomegranate.  ¨ Meats and other protein foods: Beans. Almonds. Sunflower seeds. Pine nuts. Peanuts. Cod. [place]. Scallops. Shrimp. Tuna. Tilapia. Clams. Oysters. Eggs.  ¨ Dairy: Low-fat milk. Cheese. Greek yogurt.  ¨ Beverages: Water. Red wine. Herbal tea.  ¨ Fats and oils: Extra virgin olive oil. Avocado oil. Grape seed oil.  ¨ Sweets and desserts: Greek yogurt with honey. Baked apples. Poached pears. Trail mix.  ¨ Seasoning and other foods: Basil. Cilantro. Coriander. Cumin. Mint. Parsley. [place]. Rosemary. Tarragon. Garlic. Oregano. Thyme. Pepper. Balsalmic vinegar. Tahini. Hummus. Tomato sauce. Olives. Mushrooms.  ¨ Limit these  ¨ Grains: Prepackaged pasta or rice dishes. Prepackaged cereal with added sugar.  ¨ Vegetables: Deep fried potatoes (french fries).  ¨ Fruits: Fruit canned in syrup.  ¨ Meats and other protein foods: Beef. Pork. Lamb. Poultry with skin. Hot dogs. [place].  ¨ Dairy: Ice cream. Sour cream. Whole milk.  ¨ Beverages: Juice. Sugar-sweetened soft drinks. Beer. Liquor and spirits.  ¨ Fats and oils: Butter. Canola oil. Vegetable oil. Beef fat (tallow). Lard.  ¨ Sweets and desserts: Cookies. Cakes. Pies. Candy.  ¨ Seasoning and other foods: Mayonnaise. Premade sauces and marinades.  ¨ The items listed may not be a complete list. Talk with your dietitian about what dietary choices are right for you.  Summary  · The Mediterranean diet includes both food and lifestyle choices.  · Eat a variety of fresh fruits and vegetables, beans, nuts, seeds, and whole grains.  · Limit the amount of red meat and sweets that you eat.  · Talk with your health care provider about whether it is safe for you to drink red wine in moderation. This means 1 glass a day for nonpregnant women and 2 glasses a day  for men. A glass of wine equals 5 oz (150 mL).  This information is not intended to replace advice given to you by your health care provider. Make sure you discuss any questions you have with your health care provider.  Document Released: 08/10/2017 Document Revised: 09/12/2017 Document Reviewed: 08/10/2017  [employer] Interactive Patient Education © 2017 [employer]

## 2020-04-21 DIAGNOSIS — K21.9 GERD WITHOUT ESOPHAGITIS: ICD-10-CM

## 2020-04-21 NOTE — TELEPHONE ENCOUNTER
Was the patient seen in the last year in this department? Yes    Does patient have an active prescription for medications requested? No     Received Request Via: Pharmacy    Pt met protocol?: Yes     Last OV 03/04/2020

## 2020-04-22 DIAGNOSIS — M81.0 OSTEOPOROSIS, UNSPECIFIED OSTEOPOROSIS TYPE, UNSPECIFIED PATHOLOGICAL FRACTURE PRESENCE: ICD-10-CM

## 2020-04-22 DIAGNOSIS — I10 ESSENTIAL HYPERTENSION: ICD-10-CM

## 2020-04-22 DIAGNOSIS — I25.10 CORONARY ARTERY DISEASE INVOLVING NATIVE CORONARY ARTERY OF NATIVE HEART WITHOUT ANGINA PECTORIS: ICD-10-CM

## 2020-04-22 RX ORDER — OMEPRAZOLE 20 MG/1
CAPSULE, DELAYED RELEASE ORAL
Qty: 90 CAP | Refills: 1 | Status: SHIPPED | OUTPATIENT
Start: 2020-04-22 | End: 2020-12-29

## 2020-04-22 NOTE — TELEPHONE ENCOUNTER
Was the patient seen in the last year in this department? Yes    Does patient have an active prescription for medications requested? No     Received Request Via: Pharmacy      Pt met protocol?: Yes   Last ov 3/2020   BP Readings from Last 1 Encounters:   03/04/20 132/84     Lab Results   Component Value Date/Time    SODIUM 139 01/14/2019 10:31 AM    SODIUM 139 01/14/2019 10:31 AM    POTASSIUM 4.3 01/14/2019 10:31 AM    POTASSIUM 4.2 01/14/2019 10:31 AM    CHLORIDE 102 01/14/2019 10:31 AM    CHLORIDE 102 01/14/2019 10:31 AM    CO2 27 01/14/2019 10:31 AM    CO2 27 01/14/2019 10:31 AM    GLUCOSE 96 01/14/2019 10:31 AM    GLUCOSE 96 01/14/2019 10:31 AM    BUN 16 01/14/2019 10:31 AM    BUN 16 01/14/2019 10:31 AM    CREATININE 1.54 (H) 01/14/2019 10:31 AM    CREATININE 1.60 (H) 01/14/2019 10:31 AM    CREATININE 1.50 (H) 03/27/2013 12:00 AM    BUNCREATRAT 16 08/22/2013 10:28 AM    BUNCREATRAT 21 03/27/2013 12:00 AM      Lab Results   Component Value Date/Time    CHOLSTRLTOT 199 10/30/2018 0822    TRIGLYCERIDE 165 (H) 10/30/2018 0822    HDL 60 10/30/2018 0822     (H) 10/30/2018 0822

## 2020-04-23 RX ORDER — FELODIPINE 5 MG/1
TABLET, EXTENDED RELEASE ORAL
Qty: 180 TAB | Refills: 1 | Status: SHIPPED | OUTPATIENT
Start: 2020-04-23 | End: 2020-12-29

## 2020-04-23 RX ORDER — RALOXIFENE HYDROCHLORIDE 60 MG/1
TABLET, FILM COATED ORAL
Qty: 90 TAB | Refills: 1 | Status: SHIPPED | OUTPATIENT
Start: 2020-04-23 | End: 2020-12-29

## 2020-04-23 RX ORDER — BUPROPION HYDROCHLORIDE 150 MG/1
TABLET, EXTENDED RELEASE ORAL
Qty: 90 TAB | Refills: 1 | Status: SHIPPED | OUTPATIENT
Start: 2020-04-23 | End: 2020-12-29

## 2020-04-23 RX ORDER — ROSUVASTATIN CALCIUM 10 MG/1
TABLET, COATED ORAL
Qty: 90 TAB | Refills: 1 | Status: SHIPPED | OUTPATIENT
Start: 2020-04-23 | End: 2020-11-20

## 2020-04-23 RX ORDER — FUROSEMIDE 20 MG/1
TABLET ORAL
Qty: 180 TAB | Refills: 1 | Status: SHIPPED | OUTPATIENT
Start: 2020-04-23 | End: 2021-04-13

## 2020-04-24 DIAGNOSIS — Z72.0 TOBACCO USE: ICD-10-CM

## 2020-04-24 RX ORDER — IPRATROPIUM BROMIDE AND ALBUTEROL 20; 100 UG/1; UG/1
SPRAY, METERED RESPIRATORY (INHALATION)
Qty: 12 G | Refills: 2 | Status: SHIPPED | OUTPATIENT
Start: 2020-04-24 | End: 2021-04-13

## 2020-05-26 ENCOUNTER — HOSPITAL ENCOUNTER (OUTPATIENT)
Dept: LAB | Facility: MEDICAL CENTER | Age: 70
End: 2020-05-26
Attending: FAMILY MEDICINE
Payer: MEDICARE

## 2020-05-26 DIAGNOSIS — I10 ESSENTIAL HYPERTENSION: ICD-10-CM

## 2020-05-26 DIAGNOSIS — E78.5 DYSLIPIDEMIA: ICD-10-CM

## 2020-05-26 DIAGNOSIS — N18.30 STAGE 3 CHRONIC KIDNEY DISEASE: ICD-10-CM

## 2020-05-26 DIAGNOSIS — Z11.59 ENCOUNTER FOR HEPATITIS C SCREENING TEST FOR LOW RISK PATIENT: ICD-10-CM

## 2020-05-26 LAB
ALBUMIN SERPL BCP-MCNC: 4.3 G/DL (ref 3.2–4.9)
ALBUMIN/GLOB SERPL: 1.5 G/DL
ALP SERPL-CCNC: 89 U/L (ref 30–99)
ALT SERPL-CCNC: 21 U/L (ref 2–50)
ANION GAP SERPL CALC-SCNC: 11 MMOL/L (ref 7–16)
APPEARANCE UR: ABNORMAL
AST SERPL-CCNC: 24 U/L (ref 12–45)
BACTERIA #/AREA URNS HPF: ABNORMAL /HPF
BASOPHILS # BLD AUTO: 1.2 % (ref 0–1.8)
BASOPHILS # BLD: 0.07 K/UL (ref 0–0.12)
BILIRUB SERPL-MCNC: 0.5 MG/DL (ref 0.1–1.5)
BILIRUB UR QL STRIP.AUTO: NEGATIVE
BUN SERPL-MCNC: 22 MG/DL (ref 8–22)
CALCIUM SERPL-MCNC: 9.5 MG/DL (ref 8.5–10.5)
CHLORIDE SERPL-SCNC: 102 MMOL/L (ref 96–112)
CHOLEST SERPL-MCNC: 228 MG/DL (ref 100–199)
CO2 SERPL-SCNC: 25 MMOL/L (ref 20–33)
COLOR UR: YELLOW
CREAT SERPL-MCNC: 1.56 MG/DL (ref 0.5–1.4)
CREAT UR-MCNC: 97.19 MG/DL
EOSINOPHIL # BLD AUTO: 0.1 K/UL (ref 0–0.51)
EOSINOPHIL NFR BLD: 1.7 % (ref 0–6.9)
EPI CELLS #/AREA URNS HPF: ABNORMAL /HPF
ERYTHROCYTE [DISTWIDTH] IN BLOOD BY AUTOMATED COUNT: 45.1 FL (ref 35.9–50)
FASTING STATUS PATIENT QL REPORTED: NORMAL
GLOBULIN SER CALC-MCNC: 2.9 G/DL (ref 1.9–3.5)
GLUCOSE SERPL-MCNC: 98 MG/DL (ref 65–99)
GLUCOSE UR STRIP.AUTO-MCNC: NEGATIVE MG/DL
HCT VFR BLD AUTO: 45.7 % (ref 37–47)
HCV AB SER QL: NORMAL
HDLC SERPL-MCNC: 72 MG/DL
HGB BLD-MCNC: 14.8 G/DL (ref 12–16)
IMM GRANULOCYTES # BLD AUTO: 0.06 K/UL (ref 0–0.11)
IMM GRANULOCYTES NFR BLD AUTO: 1 % (ref 0–0.9)
KETONES UR STRIP.AUTO-MCNC: NEGATIVE MG/DL
LDLC SERPL CALC-MCNC: 119 MG/DL
LEUKOCYTE ESTERASE UR QL STRIP.AUTO: ABNORMAL
LYMPHOCYTES # BLD AUTO: 1.87 K/UL (ref 1–4.8)
LYMPHOCYTES NFR BLD: 32.1 % (ref 22–41)
MCH RBC QN AUTO: 32.1 PG (ref 27–33)
MCHC RBC AUTO-ENTMCNC: 32.4 G/DL (ref 33.6–35)
MCV RBC AUTO: 99.1 FL (ref 81.4–97.8)
MICRO URNS: ABNORMAL
MICROALBUMIN UR-MCNC: 25 MG/DL
MICROALBUMIN/CREAT UR: 257 MG/G (ref 0–30)
MONOCYTES # BLD AUTO: 0.42 K/UL (ref 0–0.85)
MONOCYTES NFR BLD AUTO: 7.2 % (ref 0–13.4)
NEUTROPHILS # BLD AUTO: 3.31 K/UL (ref 2–7.15)
NEUTROPHILS NFR BLD: 56.8 % (ref 44–72)
NITRITE UR QL STRIP.AUTO: POSITIVE
NRBC # BLD AUTO: 0 K/UL
NRBC BLD-RTO: 0 /100 WBC
PH UR STRIP.AUTO: 6 [PH] (ref 5–8)
PLATELET # BLD AUTO: 195 K/UL (ref 164–446)
PMV BLD AUTO: 10.1 FL (ref 9–12.9)
POTASSIUM SERPL-SCNC: 4.7 MMOL/L (ref 3.6–5.5)
PROT SERPL-MCNC: 7.2 G/DL (ref 6–8.2)
PROT UR QL STRIP: 100 MG/DL
PTH-INTACT SERPL-MCNC: 143 PG/ML (ref 14–72)
RBC # BLD AUTO: 4.61 M/UL (ref 4.2–5.4)
RBC # URNS HPF: ABNORMAL /HPF
RBC UR QL AUTO: ABNORMAL
SODIUM SERPL-SCNC: 138 MMOL/L (ref 135–145)
SP GR UR STRIP.AUTO: 1.01
TRIGL SERPL-MCNC: 185 MG/DL (ref 0–149)
UROBILINOGEN UR STRIP.AUTO-MCNC: 0.2 MG/DL
WBC # BLD AUTO: 5.8 K/UL (ref 4.8–10.8)
WBC #/AREA URNS HPF: ABNORMAL /HPF

## 2020-05-26 PROCEDURE — 86803 HEPATITIS C AB TEST: CPT

## 2020-05-26 PROCEDURE — 36415 COLL VENOUS BLD VENIPUNCTURE: CPT

## 2020-05-26 PROCEDURE — 85025 COMPLETE CBC W/AUTO DIFF WBC: CPT

## 2020-05-26 PROCEDURE — 83970 ASSAY OF PARATHORMONE: CPT

## 2020-05-26 PROCEDURE — 82306 VITAMIN D 25 HYDROXY: CPT

## 2020-05-26 PROCEDURE — 81001 URINALYSIS AUTO W/SCOPE: CPT

## 2020-05-26 PROCEDURE — 82043 UR ALBUMIN QUANTITATIVE: CPT

## 2020-05-26 PROCEDURE — 82570 ASSAY OF URINE CREATININE: CPT

## 2020-05-26 PROCEDURE — 80061 LIPID PANEL: CPT

## 2020-05-26 PROCEDURE — 80053 COMPREHEN METABOLIC PANEL: CPT

## 2020-05-27 LAB — 25(OH)D3 SERPL-MCNC: 11 NG/ML (ref 30–100)

## 2020-05-28 ENCOUNTER — APPOINTMENT (OUTPATIENT)
Dept: NEPHROLOGY | Facility: MEDICAL CENTER | Age: 70
End: 2020-05-28
Payer: MEDICARE

## 2020-05-29 DIAGNOSIS — N18.30 STAGE 3 CHRONIC KIDNEY DISEASE: ICD-10-CM

## 2020-05-29 DIAGNOSIS — E55.9 VITAMIN D DEFICIENCY: ICD-10-CM

## 2020-05-29 RX ORDER — ERGOCALCIFEROL 1.25 MG/1
50000 CAPSULE ORAL
Qty: 12 CAP | Refills: 0 | Status: SHIPPED
Start: 2020-05-29 | End: 2021-01-14

## 2020-07-01 ENCOUNTER — OFFICE VISIT (OUTPATIENT)
Dept: NEPHROLOGY | Facility: MEDICAL CENTER | Age: 70
End: 2020-07-01
Payer: MEDICARE

## 2020-07-01 DIAGNOSIS — R80.9 MICROALBUMINURIA: ICD-10-CM

## 2020-07-01 DIAGNOSIS — N18.30 STAGE 3 CHRONIC KIDNEY DISEASE: ICD-10-CM

## 2020-07-01 DIAGNOSIS — D64.9 ANEMIA, UNSPECIFIED TYPE: ICD-10-CM

## 2020-07-01 DIAGNOSIS — E55.9 VITAMIN D DEFICIENCY: ICD-10-CM

## 2020-07-01 DIAGNOSIS — E21.1 HYPERPARATHYROIDISM DUE TO VITAMIN D DEFICIENCY (HCC): ICD-10-CM

## 2020-07-01 DIAGNOSIS — I10 ESSENTIAL HYPERTENSION: ICD-10-CM

## 2020-07-01 PROCEDURE — 99442 PR PHYSICIAN TELEPHONE EVALUATION 11-20 MIN: CPT | Performed by: INTERNAL MEDICINE

## 2020-07-01 NOTE — PROGRESS NOTES
Subjective:      Lauren Bowden is a 68 y.o. female who presents with CKD Stage 3 No chief complaint on file.            HPI  68 year old with CKD III, history of renal obstruction s/p stent which is now removed, and US with bilateral atrophy. Has been doing well currently. Has HTN which has been controlled. No blood in the urine. Occasional use of ibuprofen for back pain, usually only about 3 x per month. No hematuria.      At this point seems to be doing well.  Serum creatinine is stable.  Microalbumin to creatinine ratio down to 67.    Review of Systems   All other systems reviewed and are negative.         Objective:     There were no vitals taken for this visit.     Physical Exam  Constitutional:       Appearance: She is well-developed.   HENT:      Head: Normocephalic and atraumatic.   Cardiovascular:      Rate and Rhythm: Normal rate and regular rhythm.   Pulmonary:      Effort: Pulmonary effort is normal.      Breath sounds: Normal breath sounds.   Musculoskeletal:         General: No deformity.   Skin:     General: Skin is warm and dry.   Neurological:      Mental Status: She is alert and oriented to person, place, and time.   Psychiatric:         Behavior: Behavior normal.                 Assessment/Plan:     1. Stage 3 chronic kidney disease (HCC)  Patient's chronic kidney disease stage III with a serum creatinine of 1.67 slightly up from last year.  However, this appears to be the expected rate of decline.  Of note, the proteinuria is much improved.  Blood pressure appears to be stable.  I expect stabilization of her renal function.  However, we will need to check labs.  Labs ordered for next visit.    - BASIC METABOLIC PANEL; Future  - PTH INTACT (PTH ONLY); Future  - VITAMIN D,25 HYDROXY; Future  - CBC WITHOUT DIFFERENTIAL; Future  - MICROALBUMIN CREAT RATIO URINE; Future    2. Essential hypertension  Blood pressure medications reviewed.  No side effects from these medications.  Continue.

## 2020-07-02 NOTE — PROGRESS NOTES
Telephone Appointment Visit   As a means of avoiding spread of COVID-19, this visit is being conducted by telephone. This telephone visit was initiated by the patient and they verbally consented.    Time at start of call: 14:58    Reason for Call:  CKD, f/u    HPI:      70 year old with CKD III, history of renal obstruction s/p stent which is now removed, and US with bilateral atrophy.  Last time seen in the clinic in Nov 2018  Doing well, no complaints  SOB at baseline on home O2  No edema  HTN: BP not monitored at home       Labs / Images Reviewed:     Lab Results   Component Value Date/Time    CREATININE 1.56 (H) 05/26/2020 08:53 AM    CREATININE 1.50 (H) 03/27/2013 12:00 AM    POTASSIUM 4.7 05/26/2020 08:53 AM         Assessment and Plan:       1.CKD III -creat stable at baseline  2.HTN: Pt advised to monitor BP at home  3.Electrolytes: well controlled.  4.Anemia: Hb stable WNL  5.Secondary hyperparathyroidism: vit D def and elevated PTH improving -continue vit D 27305 units weekly    Recs:  Continue current treatment  Low salt diet  Monitor BP  keep well hydrated  Follow-up: in 3 months    Time at end of call: 15:10  Total Time Spent: 12 minutes    Rosette Arnold M.D.

## 2020-08-11 DIAGNOSIS — G89.29 OTHER CHRONIC BACK PAIN: ICD-10-CM

## 2020-08-11 DIAGNOSIS — M54.9 OTHER CHRONIC BACK PAIN: ICD-10-CM

## 2020-08-12 RX ORDER — CYCLOBENZAPRINE HCL 10 MG
TABLET ORAL
Qty: 90 TAB | Refills: 3 | Status: SHIPPED | OUTPATIENT
Start: 2020-08-12 | End: 2021-01-14

## 2020-08-21 ENCOUNTER — OFFICE VISIT (OUTPATIENT)
Dept: MEDICAL GROUP | Facility: PHYSICIAN GROUP | Age: 70
End: 2020-08-21
Payer: MEDICARE

## 2020-08-21 VITALS
TEMPERATURE: 99.1 F | HEART RATE: 98 BPM | OXYGEN SATURATION: 94 % | SYSTOLIC BLOOD PRESSURE: 146 MMHG | DIASTOLIC BLOOD PRESSURE: 70 MMHG | HEIGHT: 64 IN | RESPIRATION RATE: 20 BRPM | BODY MASS INDEX: 32.44 KG/M2 | WEIGHT: 190 LBS

## 2020-08-21 DIAGNOSIS — I10 ESSENTIAL HYPERTENSION: ICD-10-CM

## 2020-08-21 DIAGNOSIS — R25.2 MUSCLE CRAMPING: ICD-10-CM

## 2020-08-21 DIAGNOSIS — Z78.0 MENOPAUSE: ICD-10-CM

## 2020-08-21 DIAGNOSIS — E55.9 VITAMIN D DEFICIENCY: ICD-10-CM

## 2020-08-21 PROCEDURE — 99214 OFFICE O/P EST MOD 30 MIN: CPT | Performed by: FAMILY MEDICINE

## 2020-08-21 RX ORDER — ERGOCALCIFEROL 1.25 MG/1
CAPSULE ORAL
COMMUNITY
Start: 2020-05-29 | End: 2020-08-21

## 2020-08-21 RX ORDER — ALLOPURINOL 100 MG/1
TABLET ORAL
COMMUNITY
Start: 2019-12-01 | End: 2021-06-07

## 2020-08-21 RX ORDER — LISINOPRIL 5 MG/1
5 TABLET ORAL DAILY
Qty: 90 TAB | Refills: 3 | Status: SHIPPED | OUTPATIENT
Start: 2020-08-21 | End: 2020-11-20

## 2020-08-21 ASSESSMENT — FIBROSIS 4 INDEX: FIB4 SCORE: 1.91

## 2020-08-21 NOTE — ASSESSMENT & PLAN NOTE
bp reading in clinic elevated today, was done when she was still breathing heavily due to rushing into clinic and underlying copd.  She will check her BP at home.

## 2020-09-01 NOTE — PROGRESS NOTES
Subjective:   Lauren Bowden is a 70 y.o. female here today for evaluation and management of:     HTN (hypertension)  bp reading in clinic elevated today, was done when she was still breathing heavily due to rushing into clinic and underlying copd.  She will check her BP at home.     Muscle cramping  Chronic for years  Cramping of legs, side, hand         Current medicines (including changes today)  Current Outpatient Medications   Medication Sig Dispense Refill   • allopurinol (ZYLOPRIM) 100 MG Tab      • lisinopril (PRINIVIL) 5 MG Tab Take 1 Tab by mouth every day. 90 Tab 3   • cyclobenzaprine (FLEXERIL) 10 MG Tab TAKE 1 TABLET BY MOUTH 3  TIMES DAILY AS NEEDED FOR  MILD PAIN. 90 Tab 3   • vitamin D, Ergocalciferol, (DRISDOL) 1.25 MG (68427 UT) Cap capsule Take 1 Cap by mouth every 7 days. 12 Cap 0   • COMBIVENT RESPIMAT  MCG/ACT Aero Soln INHALE 1 PUFF BY MOUTH 4  TIMES A DAY. 12 g 2   • felodipine (PLENDIL) 5 MG TABLET SR 24 HR TAKE 2 TABLETS BY MOUTH  EVERY  Tab 1   • rosuvastatin (CRESTOR) 10 MG Tab TAKE 1 TABLET BY MOUTH  EVERY EVENING 90 Tab 1   • buPROPion SR (WELLBUTRIN-SR) 150 MG TABLET SR 12 HR sustained-release tablet TAKE 1 TABLET BY MOUTH  EVERY DAY 90 Tab 1   • furosemide (LASIX) 20 MG Tab TAKE 1 TABLET BY MOUTH TWO  TIMES DAILY 180 Tab 1   • raloxifene (EVISTA) 60 MG Tab TAKE 1 TABLET BY MOUTH  EVERY DAY 90 Tab 1   • omeprazole (PRILOSEC) 20 MG delayed-release capsule TAKE 1 CAPSULE BY MOUTH  EVERY DAY 90 Cap 1   • Misc. Devices Misc Portable oxygen concentrator: use daily. 1 Each 0   • clopidogrel (PLAVIX) 75 MG Tab Take 1 Tab by mouth every day. 90 Tab 1   • calcitRIOL (ROCALTROL) 0.25 MCG Cap TAKE 1 CAPSULE BY MOUTH  EVERY 48 HOURS 45 Cap 3   • albuterol (PROVENTIL) 2.5mg/3ml Nebu Soln solution for nebulization 3 mL by Nebulization route every four hours as needed for Shortness of Breath. 75 mL 11     No current facility-administered medications for this visit.      She  has a  "past medical history of Arthritis, Asthma, Cataract, CKD (chronic kidney disease) stage 4, GFR 15-29 ml/min ([facility]) (7/1/2013), COPD, Dental disorder, Heart burn, History of anemia, Hypercholesteremia, Hypertension, Indigestion, Ovarian neoplasm (8/10/2017), Pneumonia (? 2012), Pulmonary emphysema (HCC), Stroke ([facility]) (2016), and Vitamin d deficiency (7/1/2013).    ROS  No chest pain, no shortness of breath, no abdominal pain       Objective:     /70   Pulse 98   Temp 37.3 °C (99.1 °F) (Temporal)   Resp 20   Ht 1.626 m (5' 4\")   Wt 86.2 kg (190 lb)   SpO2 94%  Body mass index is 32.61 kg/m².   Physical Exam:  Constitutional: Alert, no distress.  Skin: Warm, dry, good turgor, no rashes in visible areas.  Eye: Equal, round and reactive, conjunctiva clear, lids normal.  Neck: Trachea midline  Respiratory: Unlabored respiratory effort  Psych: Alert and oriented x3, normal affect and mood.        Assessment and Plan:   The following treatment plan was discussed    1. Menopause  - DS-BONE DENSITY STUDY (DEXA); Future    2. Essential hypertension  Uncontrolled, wait for home reading    3. Vitamin D deficiency  - VITAMIN D,25 HYDROXY; Future    4. Muscle cramping  Advised stretches and regular exercise, stationary bike, hydration, mg,       Followup: Return in about 3 months (around 11/21/2020) for cramping, vit d def, ckd, copd.         "

## 2020-09-18 ENCOUNTER — APPOINTMENT (OUTPATIENT)
Dept: RADIOLOGY | Facility: MEDICAL CENTER | Age: 70
End: 2020-09-18
Attending: FAMILY MEDICINE
Payer: MEDICARE

## 2020-10-08 ENCOUNTER — APPOINTMENT (OUTPATIENT)
Dept: NEPHROLOGY | Facility: MEDICAL CENTER | Age: 70
End: 2020-10-08
Payer: MEDICARE

## 2020-10-15 ENCOUNTER — NON-PROVIDER VISIT (OUTPATIENT)
Dept: MEDICAL GROUP | Facility: PHYSICIAN GROUP | Age: 70
End: 2020-10-15
Payer: MEDICARE

## 2020-10-15 ENCOUNTER — HOSPITAL ENCOUNTER (OUTPATIENT)
Dept: LAB | Facility: MEDICAL CENTER | Age: 70
End: 2020-10-15
Attending: FAMILY MEDICINE
Payer: MEDICARE

## 2020-10-15 DIAGNOSIS — E55.9 VITAMIN D DEFICIENCY: ICD-10-CM

## 2020-10-15 DIAGNOSIS — Z23 NEED FOR VACCINATION: ICD-10-CM

## 2020-10-15 PROCEDURE — 36415 COLL VENOUS BLD VENIPUNCTURE: CPT

## 2020-10-15 PROCEDURE — 82306 VITAMIN D 25 HYDROXY: CPT

## 2020-10-15 PROCEDURE — G0008 ADMIN INFLUENZA VIRUS VAC: HCPCS | Performed by: FAMILY MEDICINE

## 2020-10-15 PROCEDURE — 90662 IIV NO PRSV INCREASED AG IM: CPT | Performed by: FAMILY MEDICINE

## 2020-10-16 LAB — 25(OH)D3 SERPL-MCNC: 25 NG/ML (ref 30–100)

## 2020-11-20 ENCOUNTER — TELEMEDICINE (OUTPATIENT)
Dept: MEDICAL GROUP | Facility: PHYSICIAN GROUP | Age: 70
End: 2020-11-20
Payer: MEDICARE

## 2020-11-20 VITALS — BODY MASS INDEX: 31.58 KG/M2 | HEIGHT: 64 IN | WEIGHT: 185 LBS

## 2020-11-20 DIAGNOSIS — I10 ESSENTIAL HYPERTENSION: ICD-10-CM

## 2020-11-20 DIAGNOSIS — R53.81 DEBILITY: ICD-10-CM

## 2020-11-20 DIAGNOSIS — Z12.39 ENCOUNTER FOR BREAST CANCER SCREENING USING NON-MAMMOGRAM MODALITY: ICD-10-CM

## 2020-11-20 DIAGNOSIS — R54 AGE-RELATED PHYSICAL DEBILITY: ICD-10-CM

## 2020-11-20 DIAGNOSIS — N18.32 STAGE 3B CHRONIC KIDNEY DISEASE: ICD-10-CM

## 2020-11-20 DIAGNOSIS — J96.11 CHRONIC RESPIRATORY FAILURE WITH HYPOXIA (HCC): ICD-10-CM

## 2020-11-20 PROCEDURE — 98967 PH1 ASSMT&MGMT NQHP 11-20: CPT | Performed by: FAMILY MEDICINE

## 2020-11-20 RX ORDER — LOSARTAN POTASSIUM 25 MG/1
25 TABLET ORAL DAILY
Qty: 90 TAB | Refills: 3 | Status: SHIPPED | OUTPATIENT
Start: 2020-11-20 | End: 2021-04-15

## 2020-11-20 RX ORDER — ROSUVASTATIN CALCIUM 20 MG/1
20 TABLET, COATED ORAL EVERY EVENING
Qty: 90 TAB | Refills: 3 | Status: SHIPPED | OUTPATIENT
Start: 2020-11-20 | End: 2021-10-12

## 2020-11-20 ASSESSMENT — FIBROSIS 4 INDEX: FIB4 SCORE: 1.91

## 2020-11-20 NOTE — ASSESSMENT & PLAN NOTE
Chronic condition  Pt on lisinopril 5 mg, felodipine 5 mg  She is advised to check BP a few times at home before next visit. She has no CP or LE , she elevated her feet when at home.   Dry cough with lisinopril 4 mg. Will stop lisinopril and provide Rx for losartan 25 mg

## 2020-11-20 NOTE — PROGRESS NOTES
Telephone Appointment Visit   As a means of avoiding spread of COVID-19, this visit is being conducted by telephone. This telephone visit was initiated by the patient and they verbally consented.    Time at start of call: 11.15    Reason for Call:  Lab Follow-up    HPI:    HTN (hypertension)  Chronic condition  Pt on lisinopril 5 mg, felodipine 5 mg  She is advised to check BP a few times at home before next visit. She has no CP or LE , she elevated her feet when at home.   Dry cough with lisinopril 4 mg. Will stop lisinopril and provide Rx for losartan 25 mg    Chronic respiratory failure with hypoxia (HCC)  Chronic condition, she feels her breathing is gradually getting worse.   She does her projects in increments and rests in between.   2 to 2.5 L via nasal cannula for oxygen supplementation    Debility  Due to pain and shortness of breath from chronic respiratory failure and on oxygen supplementation, patient has difficulty with ADLs toileting. Will refer to home health for assistance.     Stage 3 chronic kidney disease (HCC)  Chronic condition. Holding steady,   Cough with lisinopril rx for losartan provided.     Encounter for breast cancer screening using non-mammogram modality  Patient is 70F who needs breast cancer screening. She is unable to tolerate standing for a mammogram due to being on oxygen with chronic respiratory failure.   I will order a screening ultrasound.        Labs / Images Reviewed:        Assessment and Plan:     1. Essential hypertension    2. Chronic respiratory failure with hypoxia (HCC)  - REFERRAL TO HOME HEALTH    3. Age-related physical debility  - REFERRAL TO HOME HEALTH    4. Debility    5. Stage 3b chronic kidney disease    6. Encounter for breast cancer screening using non-mammogram modality  - US-BREAST BILAT-COMPLETE; Future    Other orders  - losartan (COZAAR) 25 MG Tab; Take 1 Tab by mouth every day.  Dispense: 90 Tab; Refill: 3  - rosuvastatin (CRESTOR) 20 MG Tab; Take 1  Tab by mouth every evening.  Dispense: 90 Tab; Refill: 3      Follow-up: as scheduled in January.     Time at end of call: 11.33  Total Time Spent: 11-20 minutes    Maisha Bay M.D.

## 2020-11-20 NOTE — ASSESSMENT & PLAN NOTE
Patient is 70F who needs breast cancer screening. She is unable to tolerate standing for a mammogram due to being on oxygen with chronic respiratory failure.   I will order a screening ultrasound.

## 2020-11-20 NOTE — ASSESSMENT & PLAN NOTE
Chronic condition, she feels her breathing is gradually getting worse.   She does her projects in increments and rests in between.   2 to 2.5 L via nasal cannula for oxygen supplementation

## 2020-11-20 NOTE — ASSESSMENT & PLAN NOTE
Due to pain and shortness of breath from chronic respiratory failure and on oxygen supplementation, patient has difficulty with ADLs toileting. Will refer to home health for assistance.

## 2020-12-26 DIAGNOSIS — I25.10 CORONARY ARTERY DISEASE INVOLVING NATIVE CORONARY ARTERY OF NATIVE HEART WITHOUT ANGINA PECTORIS: ICD-10-CM

## 2020-12-26 DIAGNOSIS — M81.0 OSTEOPOROSIS, UNSPECIFIED OSTEOPOROSIS TYPE, UNSPECIFIED PATHOLOGICAL FRACTURE PRESENCE: ICD-10-CM

## 2020-12-26 DIAGNOSIS — K21.9 GERD WITHOUT ESOPHAGITIS: ICD-10-CM

## 2020-12-29 RX ORDER — BUPROPION HYDROCHLORIDE 150 MG/1
TABLET, EXTENDED RELEASE ORAL
Qty: 90 TAB | Refills: 1 | Status: SHIPPED | OUTPATIENT
Start: 2020-12-29 | End: 2021-06-29

## 2020-12-29 RX ORDER — OMEPRAZOLE 20 MG/1
CAPSULE, DELAYED RELEASE ORAL
Qty: 90 CAP | Refills: 1 | Status: SHIPPED | OUTPATIENT
Start: 2020-12-29 | End: 2021-02-04 | Stop reason: SDUPTHER

## 2020-12-29 RX ORDER — RALOXIFENE HYDROCHLORIDE 60 MG/1
TABLET, FILM COATED ORAL
Qty: 90 TAB | Refills: 1 | Status: SHIPPED | OUTPATIENT
Start: 2020-12-29 | End: 2021-02-04 | Stop reason: SDUPTHER

## 2020-12-29 RX ORDER — FELODIPINE 5 MG/1
TABLET, EXTENDED RELEASE ORAL
Qty: 180 TAB | Refills: 1 | Status: SHIPPED | OUTPATIENT
Start: 2020-12-29 | End: 2021-02-04 | Stop reason: SDUPTHER

## 2020-12-29 NOTE — TELEPHONE ENCOUNTER
Refill X 6 months, sent to pharmacy.Pt. Seen in the last 6 months per protocol.   Lab Results   Component Value Date/Time    SODIUM 138 05/26/2020 08:53 AM    POTASSIUM 4.7 05/26/2020 08:53 AM    CHLORIDE 102 05/26/2020 08:53 AM    CO2 25 05/26/2020 08:53 AM    GLUCOSE 98 05/26/2020 08:53 AM    BUN 22 05/26/2020 08:53 AM    CREATININE 1.56 (H) 05/26/2020 08:53 AM    CREATININE 1.50 (H) 03/27/2013 12:00 AM    BUNCREATRAT 16 08/22/2013 10:28 AM    BUNCREATRAT 21 03/27/2013 12:00 AM

## 2021-01-14 ENCOUNTER — TELEPHONE (OUTPATIENT)
Dept: MEDICAL GROUP | Facility: PHYSICIAN GROUP | Age: 71
End: 2021-01-14

## 2021-01-14 ENCOUNTER — TELEMEDICINE (OUTPATIENT)
Dept: MEDICAL GROUP | Facility: PHYSICIAN GROUP | Age: 71
End: 2021-01-14
Payer: MEDICARE

## 2021-01-14 VITALS — BODY MASS INDEX: 33.29 KG/M2 | HEIGHT: 64 IN | WEIGHT: 195 LBS

## 2021-01-14 DIAGNOSIS — J96.11 CHRONIC RESPIRATORY FAILURE WITH HYPOXIA (HCC): ICD-10-CM

## 2021-01-14 DIAGNOSIS — E78.5 DYSLIPIDEMIA: ICD-10-CM

## 2021-01-14 DIAGNOSIS — Z87.891 HISTORY OF TOBACCO ABUSE: ICD-10-CM

## 2021-01-14 DIAGNOSIS — E55.9 VITAMIN D DEFICIENCY: ICD-10-CM

## 2021-01-14 DIAGNOSIS — Z12.11 SCREENING FOR COLON CANCER: ICD-10-CM

## 2021-01-14 DIAGNOSIS — I10 ESSENTIAL HYPERTENSION: ICD-10-CM

## 2021-01-14 DIAGNOSIS — N18.32 STAGE 3B CHRONIC KIDNEY DISEASE: ICD-10-CM

## 2021-01-14 DIAGNOSIS — Z63.4 SPOUSE DECEASED: ICD-10-CM

## 2021-01-14 DIAGNOSIS — Z12.39 ENCOUNTER FOR BREAST CANCER SCREENING USING NON-MAMMOGRAM MODALITY: ICD-10-CM

## 2021-01-14 PROCEDURE — 99443 PR PHYSICIAN TELEPHONE EVALUATION 21-30 MIN: CPT | Performed by: FAMILY MEDICINE

## 2021-01-14 SDOH — SOCIAL STABILITY - SOCIAL INSECURITY: DISSAPEARANCE AND DEATH OF FAMILY MEMBER: Z63.4

## 2021-01-14 ASSESSMENT — FIBROSIS 4 INDEX: FIB4 SCORE: 1.93

## 2021-01-14 NOTE — ASSESSMENT & PLAN NOTE
Results for BEVERLY CAMPOS (MRN [id number]) as of 1/14/2021 10:59   Ref. Range 10/30/2018 08:23 1/14/2019 10:31 1/14/2019 10:31 5/26/2020 08:52 5/26/2020 08:53   Creatinine Latest Ref Range: 0.50 - 1.40 mg/dL 1.67 (H) 1.54 (H) 1.60 (H)  1.56 (H)   GFR If  Latest Ref Range: >60 mL/min/1.73 m 2 37 (A) 40 (A) 39 (A)  40 (A)   GFR If Non  Latest Ref Range: >60 mL/min/1.73 m 2 30 (A) 33 (A) 32 (A)  33 (A)   Chronic condition, stable.   Nephrologist does follow her.   Cough with ACEI, is on losartan 25 mg, takes lasix 20 mg

## 2021-01-14 NOTE — TELEPHONE ENCOUNTER
Please check with radiology department: is there any modality for breast cancer screening where patient does not have to stand?     Has severe copd and unable to stand long enough for mammogram.   Thank you.

## 2021-01-14 NOTE — ASSESSMENT & PLAN NOTE
Chronic condition due to COPD.   Continuous oxygen supplementation via NC. Had to increase recently to 2.5.   She has home health, meals on wheels and house keeper which helps her take care of things at home.

## 2021-01-14 NOTE — ASSESSMENT & PLAN NOTE
Her  who was also my patient passed away in the hospital on 1/10/2021. Patient has her daughter with her and her grand daughter is going to be staying with with her and has home health also in place.   Patient would completely in charge of home executive functions. She has meals on wheels and gets one meal a week which is perfect for her. She has a house keeper who comes bi weekly which is very helpful as limitations due to copd.

## 2021-01-14 NOTE — TELEPHONE ENCOUNTER
I have called the breast health center who stated all locations can accomodate patients who are unable to stand     Patient informed [phone] (home)

## 2021-01-14 NOTE — ASSESSMENT & PLAN NOTE
US breast was ordered at pt cannot stand long enough for mammo due to copd and chronic resp failure.

## 2021-01-14 NOTE — PATIENT INSTRUCTIONS
Please call [phone] and ask for radiology department to schedule the ordered breast ultrasound for breast cancer screening.     Fasting labs in 3 months    cologuard ordered    Referral to screening CT for lung cancer screening program done.

## 2021-01-15 NOTE — PROGRESS NOTES
Telephone Appointment Visit   As a means of avoiding spread of COVID-19, this visit is being conducted by telephone. This telephone visit was initiated by the patient and they verbally consented.    Time at start of call: 11.00 am    Reason for Call:  Lab Follow-up    HPI:    Stage 3 chronic kidney disease (HCC)  Results for BEVERLY CAMPOS (MRN [id number]) as of 2021 10:59   Ref. Range 10/30/2018 08:23 2019 10:31 2019 10:31 2020 08:52 2020 08:53   Creatinine Latest Ref Range: 0.50 - 1.40 mg/dL 1.67 (H) 1.54 (H) 1.60 (H)  1.56 (H)   GFR If  Latest Ref Range: >60 mL/min/1.73 m 2 37 (A) 40 (A) 39 (A)  40 (A)   GFR If Non  Latest Ref Range: >60 mL/min/1.73 m 2 30 (A) 33 (A) 32 (A)  33 (A)   Chronic condition, stable.   Nephrologist does follow her.   Cough with ACEI, is on losartan 25 mg, takes lasix 20 mg    Spouse   Her  who was also my patient passed away in the hospital on 1/10/2021. Patient has her daughter with her and her grand daughter is going to be staying with with her and has home health also in place.   Patient would completely in charge of home executive functions. She has meals on wheels and gets one meal a week which is perfect for her. She has a house keeper who comes bi weekly which is very helpful as limitations due to copd.     Chronic respiratory failure with hypoxia (HCC)  Chronic condition due to COPD.   Continuous oxygen supplementation via NC. Had to increase recently to 2.5.   She has home health, meals on wheels and house keeper which helps her take care of things at home.     Encounter for breast cancer screening using non-mammogram modality  US breast was ordered at pt cannot stand long enough for mammo due to copd and chronic resp failure.        Assessment and Plan:     1. Stage 3b chronic kidney disease  - Comp Metabolic Panel; Future    2. History of tobacco abuse  - REFERRAL TO LUNG CANCER SCREENING PROGRAM;  Future  - CBC WITH DIFFERENTIAL; Future    3. Spouse     4. Chronic respiratory failure with hypoxia (HCC)  - CBC WITH DIFFERENTIAL; Future    5. Encounter for breast cancer screening using non-mammogram modality    6. Essential hypertension  - Comp Metabolic Panel; Future    7. Vitamin D deficiency  - VITAMIN D,25 HYDROXY; Future    8. Dyslipidemia  - Comp Metabolic Panel; Future  - Lipid Profile; Future    9. Screening for colon cancer  - COLOGUARD (FIT DNA)    Other orders  - Magnesium 400 MG Cap; Take  by mouth.      Follow-up: 2021    Time at end of call: 11.25 am  Total Time Spent: 21-30 minutes    Maisha Bay M.D.

## 2021-01-18 ENCOUNTER — TELEPHONE (OUTPATIENT)
Dept: HEMATOLOGY ONCOLOGY | Facility: MEDICAL CENTER | Age: 71
End: 2021-01-18

## 2021-01-18 NOTE — TELEPHONE ENCOUNTER
Received referral to lung cancer screening program.  Chart review to assess for lung cancer screening program eligibility.   1. Age 55-77 yrs of age? Yes 71 y.o.  2. 30 pack year hx of smoking, or greater? Yes 1 [id number] [id number]  3. Current smoker or if quit, has pt quit within last 15 yrs?Yes  Quit 10/1/2010  4. Any signs or symptoms of lung cancer? None noted  5. Previous history of lung cancer? None noted  6. Chest CT within past 12 mos.? None noted  Patient does meet eligibility criteria. [facility] scheduling notified to schedule the shared decision making visit.

## 2021-01-27 ENCOUNTER — TELEMEDICINE (OUTPATIENT)
Dept: MEDICAL GROUP | Facility: PHYSICIAN GROUP | Age: 71
End: 2021-01-27
Payer: MEDICARE

## 2021-01-27 VITALS
DIASTOLIC BLOOD PRESSURE: 82 MMHG | BODY MASS INDEX: 34.15 KG/M2 | HEIGHT: 64 IN | WEIGHT: 200 LBS | TEMPERATURE: 98.2 F | HEART RATE: 68 BPM | RESPIRATION RATE: 18 BRPM | OXYGEN SATURATION: 95 % | SYSTOLIC BLOOD PRESSURE: 135 MMHG

## 2021-01-27 DIAGNOSIS — Z63.4 SPOUSE DECEASED: ICD-10-CM

## 2021-01-27 DIAGNOSIS — R25.2 MUSCLE CRAMPING: ICD-10-CM

## 2021-01-27 DIAGNOSIS — J96.11 CHRONIC RESPIRATORY FAILURE WITH HYPOXIA (HCC): ICD-10-CM

## 2021-01-27 DIAGNOSIS — I25.10 CORONARY ARTERY DISEASE INVOLVING NATIVE HEART WITHOUT ANGINA PECTORIS, UNSPECIFIED VESSEL OR LESION TYPE: ICD-10-CM

## 2021-01-27 DIAGNOSIS — I10 ESSENTIAL HYPERTENSION: ICD-10-CM

## 2021-01-27 DIAGNOSIS — R53.81 DEBILITY: ICD-10-CM

## 2021-01-27 PROCEDURE — 99214 OFFICE O/P EST MOD 30 MIN: CPT | Mod: 95,CR | Performed by: FAMILY MEDICINE

## 2021-01-27 SDOH — SOCIAL STABILITY - SOCIAL INSECURITY: DISSAPEARANCE AND DEATH OF FAMILY MEMBER: Z63.4

## 2021-01-27 ASSESSMENT — FIBROSIS 4 INDEX: FIB4 SCORE: 1.93

## 2021-01-27 NOTE — ASSESSMENT & PLAN NOTE
chornic condition, seems to be improving recently affects her hands, legs and feet and flank.   Patient does not currently use a walker, cane or wheelchair and has no recent falls.

## 2021-01-27 NOTE — ASSESSMENT & PLAN NOTE
Chronic oxygen supplementation required due to COPD, patient is currently on 2.5L of oxygen via nasal cannula. Patient has limitations to ADLs due to chronic respiratory failure and shortness of breath due to COPD.   She needs to continue with home health, meals on wheels and has help with household tasks.   She does live alone since her   recently. She has a daughter in [place] and granddaughter in [place].

## 2021-01-27 NOTE — ASSESSMENT & PLAN NOTE
Chronic condition, stable on current medications.   She is on supplemental oxygen,   Continues on felodipine (calcium channel blocker), losartan, rosuvastatin, plavix, lasix  Her vitals are stable and she is staying active with the help of home health intervention.

## 2021-01-27 NOTE — ASSESSMENT & PLAN NOTE
Chronic condition, well controlled on current medications.   Patient has regular check ups of vitals with home health nursing.

## 2021-01-27 NOTE — ASSESSMENT & PLAN NOTE
Chronic condition due to chronic pain from arthritis and shortness of breath due to copd.   Patient has difficulties with ADLS toileting, cooking, cleaning. Home health assistance is much appreciated and essential for her quality of life.

## 2021-01-27 NOTE — ASSESSMENT & PLAN NOTE
Patient has lost her spouse recently and her daughter and grand daughter visited and now have returned home to [place] and [place], she has home health visits with PT, OT, nursing which help tremendously with keeping her spirits up and preventing depression as she goes through the grief process.

## 2021-01-27 NOTE — PROGRESS NOTES
Virtual Visit: Established Patient   This visit was conducted via Zoom using secure and encrypted videoconferencing technology. The patient was in a private location in the state of [place].    The patient's identity was confirmed and verbal consent was obtained for this virtual visit.    Subjective:   CC:   Chief Complaint   Patient presents with   • Hypertension   • Other     [facility] needs face to face       Lauren Bowden is a 71 y.o. female presenting for evaluation and management of:    Chronic respiratory failure with hypoxia (HCC)  Chronic oxygen supplementation required due to COPD, patient is currently on 2.5L of oxygen via nasal cannula. Patient has limitations to ADLs due to chronic respiratory failure and shortness of breath due to COPD.   She needs to continue with home health, meals on wheels and has help with household tasks.   She does live alone since her   recently. She has a daughter in [place] and granddaughter in [place].     Debility  Chronic condition due to chronic pain from arthritis and shortness of breath due to copd.   Patient has difficulties with ADLS toileting, cooking, cleaning. Home health assistance is much appreciated and essential for her quality of life.       Muscle cramping  chornic condition, seems to be improving recently affects her hands, legs and feet and flank.   Patient does not currently use a walker, cane or wheelchair and has no recent falls.     CAD (coronary artery disease)  Chronic condition, stable on current medications.   She is on supplemental oxygen,   Continues on felodipine (calcium channel blocker), losartan, rosuvastatin, plavix, lasix  Her vitals are stable and she is staying active with the help of home health intervention.     HTN (hypertension)  Chronic condition, well controlled on current medications.   Patient has regular check ups of vitals with home health nursing.     Spouse   Patient has lost her spouse recently and her  daughter and grand daughter visited and now have returned home to [place] and [place], she has home health visits with PT, OT, nursing which help tremendously with keeping her spirits up and preventing depression as she goes through the grief process.       ROS   Denies any recent fevers or chills. No nausea or vomiting. No chest pains or shortness of breath.     No Known Allergies    Current medicines (including changes today)  Current Outpatient Medications   Medication Sig Dispense Refill   • Magnesium 400 MG Cap Take  by mouth.     • felodipine (PLENDIL) 5 MG TABLET SR 24 HR TAKE 2 TABLETS BY MOUTH  DAILY 180 Tab 1   • raloxifene (EVISTA) 60 MG Tab TAKE 1 TABLET BY MOUTH  DAILY 90 Tab 1   • buPROPion SR (WELLBUTRIN-SR) 150 MG TABLET SR 12 HR sustained-release tablet TAKE 1 TABLET BY MOUTH  DAILY 90 Tab 1   • omeprazole (PRILOSEC) 20 MG delayed-release capsule TAKE 1 CAPSULE BY MOUTH  DAILY 90 Cap 1   • losartan (COZAAR) 25 MG Tab Take 1 Tab by mouth every day. 90 Tab 3   • rosuvastatin (CRESTOR) 20 MG Tab Take 1 Tab by mouth every evening. 90 Tab 3   • allopurinol (ZYLOPRIM) 100 MG Tab      • COMBIVENT RESPIMAT  MCG/ACT Aero Soln INHALE 1 PUFF BY MOUTH 4  TIMES A DAY. 12 g 2   • furosemide (LASIX) 20 MG Tab TAKE 1 TABLET BY MOUTH TWO  TIMES DAILY 180 Tab 1   • Misc. Devices Misc Portable oxygen concentrator: use daily. 1 Each 0   • clopidogrel (PLAVIX) 75 MG Tab Take 1 Tab by mouth every day. 90 Tab 1   • calcitRIOL (ROCALTROL) 0.25 MCG Cap TAKE 1 CAPSULE BY MOUTH  EVERY 48 HOURS 45 Cap 3   • albuterol (PROVENTIL) 2.5mg/3ml Nebu Soln solution for nebulization 3 mL by Nebulization route every four hours as needed for Shortness of Breath. 75 mL 11     No current facility-administered medications for this visit.        Patient Active Problem List    Diagnosis Date Noted   • History of tobacco abuse (Quit >6 mos ago) 2021   • Spouse  2021   • Debility 2020   • Encounter for breast cancer  "screening using non-mammogram modality 11/20/2020   • Muscle cramping 08/21/2020   • Acute pain of left shoulder 05/13/2019   • Generalized abdominal pain 03/26/2019   • Chronic gout of right foot 10/11/2018   • Other emphysema (HCC) 07/11/2018   • Colon cancer screening 07/11/2018   • TIA (transient ischemic attack) 07/11/2018   • Obesity (BMI 30-39.9) 07/11/2018   • Stage 3 chronic kidney disease (HCC) 08/11/2017   • Lung nodule, multiple 08/11/2017   • Chronic respiratory failure with hypoxia (HCC) 02/23/2010   • CAD (coronary artery disease) 02/23/2010   • HTN (hypertension) 02/23/2010       Family History   Problem Relation Age of Onset   • Diabetes Sister    • Cancer Sister        She  has a past medical history of Arthritis, Asthma, Cataract, CKD (chronic kidney disease) stage 4, GFR 15-29 ml/min (HCC) (7/1/2013), COPD, Dental disorder, Heart burn, History of anemia, Hypercholesteremia, Hypertension, Indigestion, Ovarian neoplasm (8/10/2017), Pneumonia (? 2012), Pulmonary emphysema (HCC), Stroke (HCC) (2016), and Vitamin d deficiency (7/1/2013).  She  has a past surgical history that includes appendectomy; cataract phaco with iol (Left, 1/15/2019); cataract phaco with iol (Right, 1/29/2019); mini laparotomy (N/A, 2/7/2019); abdominal hysterectomy total (N/A, 2/7/2019); and salpingo oophorectomy (Bilateral, 2/7/2019).       Objective:   /82   Pulse 68   Temp 36.8 °C (98.2 °F) (Temporal)   Resp 18   Ht 1.626 m (5' 4\")   Wt 90.7 kg (200 lb)   SpO2 95% Comment: 2L  BMI 34.33 kg/m²     Physical Exam:  Constitutional: Alert, no distress, well-groomed.  Skin: No rashes in visible areas.  Eye: Round. Conjunctiva clear, lids normal. No icterus.   ENMT: Lips pink without lesions, good dentition, moist mucous membranes. Phonation normal.  Neck: No masses, no thyromegaly. Moves freely without pain.  Respiratory: Unlabored respiratory effort, no cough or audible wheeze  Psych: Alert and oriented x3, normal " affect and mood.       Assessment and Plan:   The following treatment plan was discussed:     1. Chronic respiratory failure with hypoxia (HCC)    2. Debility    3. Muscle cramping    4. Coronary artery disease involving native heart without angina pectoris, unspecified vessel or lesion type    5. Essential hypertension    6. Spouse         Follow-up: Return as scheduled in April, for please print note and fax to [facility] for the required Face to face .

## 2021-02-04 ENCOUNTER — TELEMEDICINE (OUTPATIENT)
Dept: HEMATOLOGY ONCOLOGY | Facility: MEDICAL CENTER | Age: 71
End: 2021-02-04
Payer: MEDICARE

## 2021-02-04 VITALS — HEIGHT: 64 IN | WEIGHT: 195 LBS | BODY MASS INDEX: 33.29 KG/M2

## 2021-02-04 DIAGNOSIS — Z87.891 PERSONAL HISTORY OF TOBACCO USE, PRESENTING HAZARDS TO HEALTH: ICD-10-CM

## 2021-02-04 DIAGNOSIS — K21.9 GERD WITHOUT ESOPHAGITIS: ICD-10-CM

## 2021-02-04 DIAGNOSIS — M81.0 OSTEOPOROSIS, UNSPECIFIED OSTEOPOROSIS TYPE, UNSPECIFIED PATHOLOGICAL FRACTURE PRESENCE: ICD-10-CM

## 2021-02-04 PROCEDURE — G0296 VISIT TO DETERM LDCT ELIG: HCPCS | Mod: 95 | Performed by: NURSE PRACTITIONER

## 2021-02-04 RX ORDER — FELODIPINE 5 MG/1
TABLET, EXTENDED RELEASE ORAL
Qty: 200 TAB | Refills: 1 | Status: SHIPPED | OUTPATIENT
Start: 2021-02-04 | End: 2021-09-07

## 2021-02-04 RX ORDER — RALOXIFENE HYDROCHLORIDE 60 MG/1
TABLET, FILM COATED ORAL
Qty: 100 TAB | Refills: 1 | Status: SHIPPED | OUTPATIENT
Start: 2021-02-04 | End: 2021-09-07

## 2021-02-04 RX ORDER — OMEPRAZOLE 20 MG/1
CAPSULE, DELAYED RELEASE ORAL
Qty: 100 CAP | Refills: 1 | Status: SHIPPED | OUTPATIENT
Start: 2021-02-04 | End: 2021-09-07

## 2021-02-04 ASSESSMENT — FIBROSIS 4 INDEX: FIB4 SCORE: 1.93

## 2021-02-04 ASSESSMENT — ENCOUNTER SYMPTOMS
HEMOPTYSIS: 0
WHEEZING: 0
WEIGHT LOSS: 0
SHORTNESS OF BREATH: 1
SPUTUM PRODUCTION: 0
COUGH: 0

## 2021-02-04 ASSESSMENT — PATIENT HEALTH QUESTIONNAIRE - PHQ9: CLINICAL INTERPRETATION OF PHQ2 SCORE: 0

## 2021-02-04 NOTE — PROGRESS NOTES
Subjective:      Lauren Bowden is a 71 y.o. female who presents with Lung Cancer Screening Program Prescreen ([facility]/ [facility]/ nicotine dependence/ Maisha Bay)        HPI   Patient seen today for initial lung cancer screening visit. Patient referred by PCP, Dr. Kareem Bay, for lung cancer screening shared decision making visit. Visit is conducted as an on demand video visit using Zoom and patient is unaccompanied today.    The patient meets eligibility criteria including age, smoking history (30+ pack years), if former smoker, quit in the last 15 years, and absence of signs or symptoms of lung cancer.    - Age - 71  - Smoking history - Patient has smoked for 41 years at an average of 1 ppd = 41 pack year smoking history.  - Current smoking status - former smoker, quit 10/2010  - No symptoms of lung cancer and no previous history of lung cancer         No Known Allergies        Current Outpatient Medications on File Prior to Visit   Medication Sig Dispense Refill   • Magnesium 400 MG Cap Take  by mouth.     • buPROPion SR (WELLBUTRIN-SR) 150 MG TABLET SR 12 HR sustained-release tablet TAKE 1 TABLET BY MOUTH  DAILY 90 Tab 1   • losartan (COZAAR) 25 MG Tab Take 1 Tab by mouth every day. 90 Tab 3   • rosuvastatin (CRESTOR) 20 MG Tab Take 1 Tab by mouth every evening. 90 Tab 3   • COMBIVENT RESPIMAT  MCG/ACT Aero Soln INHALE 1 PUFF BY MOUTH 4  TIMES A DAY. 12 g 2   • furosemide (LASIX) 20 MG Tab TAKE 1 TABLET BY MOUTH TWO  TIMES DAILY 180 Tab 1   • Misc. Devices Misc Portable oxygen concentrator: use daily. 1 Each 0   • clopidogrel (PLAVIX) 75 MG Tab Take 1 Tab by mouth every day. 90 Tab 1   • albuterol (PROVENTIL) 2.5mg/3ml Nebu Soln solution for nebulization 3 mL by Nebulization route every four hours as needed for Shortness of Breath. 75 mL 11   • allopurinol (ZYLOPRIM) 100 MG Tab      • calcitRIOL (ROCALTROL) 0.25 MCG Cap TAKE 1 CAPSULE BY MOUTH  EVERY 48 HOURS (Patient not taking: Reported on  "2/4/2021) 45 Cap 3     No current facility-administered medications on file prior to visit.            Review of Systems   Constitutional: Negative for malaise/fatigue and weight loss.   Respiratory: Positive for shortness of breath (with min activity). Negative for cough, hemoptysis, sputum production and wheezing.         O2 @ 2.5          Objective:     Ht 1.626 m (5' 4\")   Wt 88.5 kg (195 lb)   BMI 33.47 kg/m²      Physical Exam  Constitutional:       General: She is not in acute distress.     Appearance: Normal appearance. She is not ill-appearing.   Pulmonary:      Effort: Pulmonary effort is normal.   Skin:     Coloration: Skin is not jaundiced or pale.   Neurological:      Mental Status: She is alert and oriented to person, place, and time. Mental status is at baseline.   Psychiatric:         Mood and Affect: Mood normal.            Assessment/Plan:        1. Personal history of tobacco use, presenting hazards to health  CT-LUNG CANCER-SCREENING       We conducted a shared decision-making process using a decision aid. We reviewed benefits and harms of screening, including false positives and potential need for additional diagnostic testing, the possibility of over diagnosis, and total radiation exposure.    We discussed the importance of adhering to annual LDCT screening. We also discussed the impact of comorbities on the patient's the ability or willingness to undergo diagnostic procedure(s) and treatment.    Counseling on the importance of maintaining cigarette smoking abstinence if former smoker; or the importance of smoking cessation if current smoker and, if appropriate, furnishing of information about tobacco cessation interventions.    Based on our discussion, we have decided to begin annual lung cancer screening starting now.        This evaluation was conducted via Zoom using secure and encrypted videoconferencing technology. The patient was in a private location in the [facility].    The " patient's identity was confirmed and verbal consent was obtained for this virtual visit.

## 2021-02-05 RX ORDER — ALLOPURINOL 100 MG/1
100 TABLET ORAL
Qty: 90 TAB | Refills: 3 | OUTPATIENT
Start: 2021-02-05

## 2021-02-06 NOTE — TELEPHONE ENCOUNTER
Pt notified. Patient will get labs drawn next week. Patient has been taking 100mg and she's been out for a few weeks now. BLT    Pt's gfr is in 30s, allopurinol has to be dose adjusted or stopped if decline in renal function.   Please tell pt, when repeat labs done, then I'll be able to see if kidney function is stable and restart the allopurinol safely. Chart says I stopped it March last year, has she been on it at 100mg daily? This will help me determine if safe to continue based on her kidney labs.   Thank you   Maisha Bay M.D.

## 2021-02-16 ENCOUNTER — APPOINTMENT (OUTPATIENT)
Dept: RADIOLOGY | Facility: MEDICAL CENTER | Age: 71
End: 2021-02-16
Attending: NURSE PRACTITIONER
Payer: MEDICARE

## 2021-02-17 ENCOUNTER — TELEPHONE (OUTPATIENT)
Dept: MEDICAL GROUP | Facility: PHYSICIAN GROUP | Age: 71
End: 2021-02-17

## 2021-02-17 NOTE — TELEPHONE ENCOUNTER
Anna the  with [facility] called stating that they need a new order for a 1 time visit with [facility] to put together an Advanced directive with a start date date of 2/22/21. Thanks

## 2021-02-19 ENCOUNTER — TELEPHONE (OUTPATIENT)
Dept: MEDICAL GROUP | Facility: PHYSICIAN GROUP | Age: 71
End: 2021-02-19

## 2021-02-19 DIAGNOSIS — R53.81 DEBILITY: ICD-10-CM

## 2021-02-19 DIAGNOSIS — Z71.89 COUNSELING REGARDING ADVANCED DIRECTIVES: ICD-10-CM

## 2021-02-20 NOTE — TELEPHONE ENCOUNTER
[facility] called stating that the patient's BP has been running high. Today it was 153/86. Patient is taking lisinopril 5mg and they would like to know if she should double the dose.

## 2021-02-23 NOTE — TELEPHONE ENCOUNTER
Yes she should double the lisinopril dose and take a total of lisinopril 10 mg daily.     Message text

## 2021-02-23 NOTE — TELEPHONE ENCOUNTER
I have called and spoke with patient to inform her of this. Patient will start to keep a log of her BP

## 2021-03-24 ENCOUNTER — PATIENT OUTREACH (OUTPATIENT)
Dept: HEALTH INFORMATION MANAGEMENT | Facility: OTHER | Age: 71
End: 2021-03-24

## 2021-04-14 ENCOUNTER — HOSPITAL ENCOUNTER (OUTPATIENT)
Facility: MEDICAL CENTER | Age: 71
End: 2021-04-14
Attending: FAMILY MEDICINE
Payer: MEDICARE

## 2021-04-14 DIAGNOSIS — E55.9 VITAMIN D DEFICIENCY: ICD-10-CM

## 2021-04-14 DIAGNOSIS — I10 ESSENTIAL HYPERTENSION: ICD-10-CM

## 2021-04-14 DIAGNOSIS — E78.5 DYSLIPIDEMIA: ICD-10-CM

## 2021-04-14 DIAGNOSIS — Z87.891 HISTORY OF TOBACCO ABUSE: ICD-10-CM

## 2021-04-14 DIAGNOSIS — J96.11 CHRONIC RESPIRATORY FAILURE WITH HYPOXIA (HCC): ICD-10-CM

## 2021-04-14 DIAGNOSIS — N18.32 STAGE 3B CHRONIC KIDNEY DISEASE: ICD-10-CM

## 2021-04-14 LAB
ALBUMIN SERPL BCP-MCNC: 3.5 G/DL (ref 3.2–4.9)
ALBUMIN/GLOB SERPL: 1.2 G/DL
ALP SERPL-CCNC: 77 U/L (ref 30–99)
ALT SERPL-CCNC: 10 U/L (ref 2–50)
ANION GAP SERPL CALC-SCNC: 11 MMOL/L (ref 7–16)
AST SERPL-CCNC: 18 U/L (ref 12–45)
BASOPHILS # BLD AUTO: 0.7 % (ref 0–1.8)
BASOPHILS # BLD: 0.03 K/UL (ref 0–0.12)
BILIRUB SERPL-MCNC: 0.4 MG/DL (ref 0.1–1.5)
BUN SERPL-MCNC: 14 MG/DL (ref 8–22)
CALCIUM SERPL-MCNC: 9.1 MG/DL (ref 8.5–10.5)
CHLORIDE SERPL-SCNC: 106 MMOL/L (ref 96–112)
CHOLEST SERPL-MCNC: 169 MG/DL (ref 100–199)
CO2 SERPL-SCNC: 23 MMOL/L (ref 20–33)
CREAT SERPL-MCNC: 1.47 MG/DL (ref 0.5–1.4)
EOSINOPHIL # BLD AUTO: 0.11 K/UL (ref 0–0.51)
EOSINOPHIL NFR BLD: 2.4 % (ref 0–6.9)
ERYTHROCYTE [DISTWIDTH] IN BLOOD BY AUTOMATED COUNT: 46.6 FL (ref 35.9–50)
FASTING STATUS PATIENT QL REPORTED: NORMAL
GLOBULIN SER CALC-MCNC: 3 G/DL (ref 1.9–3.5)
GLUCOSE SERPL-MCNC: 97 MG/DL (ref 65–99)
HCT VFR BLD AUTO: 37.1 % (ref 37–47)
HDLC SERPL-MCNC: 50 MG/DL
HGB BLD-MCNC: 11.6 G/DL (ref 12–16)
IMM GRANULOCYTES # BLD AUTO: 0.02 K/UL (ref 0–0.11)
IMM GRANULOCYTES NFR BLD AUTO: 0.4 % (ref 0–0.9)
LDLC SERPL CALC-MCNC: 56 MG/DL
LYMPHOCYTES # BLD AUTO: 1.44 K/UL (ref 1–4.8)
LYMPHOCYTES NFR BLD: 31.6 % (ref 22–41)
MCH RBC QN AUTO: 31 PG (ref 27–33)
MCHC RBC AUTO-ENTMCNC: 31.3 G/DL (ref 33.6–35)
MCV RBC AUTO: 99.2 FL (ref 81.4–97.8)
MONOCYTES # BLD AUTO: 0.36 K/UL (ref 0–0.85)
MONOCYTES NFR BLD AUTO: 7.9 % (ref 0–13.4)
NEUTROPHILS # BLD AUTO: 2.6 K/UL (ref 2–7.15)
NEUTROPHILS NFR BLD: 57 % (ref 44–72)
NRBC # BLD AUTO: 0 K/UL
NRBC BLD-RTO: 0 /100 WBC
PLATELET # BLD AUTO: 140 K/UL (ref 164–446)
PMV BLD AUTO: 10.8 FL (ref 9–12.9)
POTASSIUM SERPL-SCNC: 4.7 MMOL/L (ref 3.6–5.5)
PROT SERPL-MCNC: 6.5 G/DL (ref 6–8.2)
RBC # BLD AUTO: 3.74 M/UL (ref 4.2–5.4)
SODIUM SERPL-SCNC: 140 MMOL/L (ref 135–145)
TRIGL SERPL-MCNC: 314 MG/DL (ref 0–149)
WBC # BLD AUTO: 4.6 K/UL (ref 4.8–10.8)

## 2021-04-14 PROCEDURE — 85025 COMPLETE CBC W/AUTO DIFF WBC: CPT

## 2021-04-14 PROCEDURE — 80061 LIPID PANEL: CPT

## 2021-04-14 PROCEDURE — 80053 COMPREHEN METABOLIC PANEL: CPT

## 2021-04-14 PROCEDURE — 82306 VITAMIN D 25 HYDROXY: CPT

## 2021-04-15 ENCOUNTER — OFFICE VISIT (OUTPATIENT)
Dept: MEDICAL GROUP | Facility: PHYSICIAN GROUP | Age: 71
End: 2021-04-15
Payer: MEDICARE

## 2021-04-15 VITALS
SYSTOLIC BLOOD PRESSURE: 142 MMHG | HEART RATE: 78 BPM | RESPIRATION RATE: 16 BRPM | BODY MASS INDEX: 30.56 KG/M2 | TEMPERATURE: 97.4 F | WEIGHT: 179 LBS | DIASTOLIC BLOOD PRESSURE: 70 MMHG | OXYGEN SATURATION: 89 % | HEIGHT: 64 IN

## 2021-04-15 DIAGNOSIS — N18.32 STAGE 3B CHRONIC KIDNEY DISEASE: ICD-10-CM

## 2021-04-15 DIAGNOSIS — I25.10 CORONARY ARTERY DISEASE INVOLVING NATIVE HEART WITHOUT ANGINA PECTORIS, UNSPECIFIED VESSEL OR LESION TYPE: ICD-10-CM

## 2021-04-15 DIAGNOSIS — J43.8 OTHER EMPHYSEMA (HCC): ICD-10-CM

## 2021-04-15 DIAGNOSIS — D53.9 MACROCYTIC ANEMIA: ICD-10-CM

## 2021-04-15 DIAGNOSIS — I10 ESSENTIAL HYPERTENSION: ICD-10-CM

## 2021-04-15 DIAGNOSIS — E66.9 OBESITY (BMI 30-39.9): ICD-10-CM

## 2021-04-15 DIAGNOSIS — Z12.31 ENCOUNTER FOR SCREENING MAMMOGRAM FOR BREAST CANCER: ICD-10-CM

## 2021-04-15 DIAGNOSIS — E78.5 DYSLIPIDEMIA: ICD-10-CM

## 2021-04-15 DIAGNOSIS — Z12.11 SCREENING FOR COLORECTAL CANCER: ICD-10-CM

## 2021-04-15 DIAGNOSIS — Z12.12 SCREENING FOR COLORECTAL CANCER: ICD-10-CM

## 2021-04-15 PROCEDURE — 99214 OFFICE O/P EST MOD 30 MIN: CPT | Performed by: FAMILY MEDICINE

## 2021-04-15 RX ORDER — METHYLPREDNISOLONE 4 MG/1
TABLET ORAL
Qty: 21 TABLET | Refills: 0 | Status: SHIPPED | OUTPATIENT
Start: 2021-04-15 | End: 2022-03-22

## 2021-04-15 RX ORDER — AZITHROMYCIN 250 MG/1
TABLET, FILM COATED ORAL
Qty: 6 TABLET | Refills: 0 | Status: SHIPPED | OUTPATIENT
Start: 2021-04-15 | End: 2021-10-20

## 2021-04-15 RX ORDER — LOSARTAN POTASSIUM 50 MG/1
50 TABLET ORAL DAILY
Qty: 90 TABLET | Refills: 3 | Status: SHIPPED | OUTPATIENT
Start: 2021-04-15 | End: 2022-05-26

## 2021-04-15 ASSESSMENT — FIBROSIS 4 INDEX: FIB4 SCORE: 2.93

## 2021-04-15 NOTE — ASSESSMENT & PLAN NOTE
Patient recently had a copd exacerbation and home health physician provided her antibiotic and steroid pack which helped.   She had difficulty getting a message across to me as she is not able to use [employer] as her password never works, also it was a Friday.   I will provide her a script for zpack and medrol dose pack in case of recurrence.

## 2021-04-15 NOTE — PROGRESS NOTES
Subjective:   Lauren Bowden is a 71 y.o. female here today for evaluation and management of:     HTN (hypertension)  Patient has chronic hypertension improved control with increase in blood pressure medication patient is currently on losartan 25 mg Lasix 20 mg  Home health helps her to check her blood pressures recent numbers look good 120s to 130s systolic 50s to 60s diastolic occasional systolic of 142-144  Today in clinic slightly elevated 142/70 heart rate is 78 we will recheck at the end of the clinic visit.  Patient has no chest pain or swelling of the legs  Renal function reviewed CKD stage IIIb stable    Stage 3 chronic kidney disease (HCC)  Chronic condition, stable.   Is on losartan 25 mg  Advised to Avoids NSAIDS, stay well hydrated, avoid high salt diet.     CAD (coronary artery disease)  Chronic condition, she has no chest pain with exertion  She does not smoke.   She is stable on medical management: Felodipine 5 mg SR calcium channel blocker, losartan 50 mg, Lasix 20 mg, Plavix 75 mg and rosuvastatin 20 mg  She continues to stay active with the help of home health.      Dyslipidemia  Improved TC and LDL but TG has elevated.   She is on crestor 20mg   Discussed addition of zetia to improve TG to lower cardiovascular risk.   Doing more carb with meals on wheels.   Will recheck labs in 6 months.     Other emphysema (HCC)  Patient recently had a copd exacerbation and home health physician provided her antibiotic and steroid pack which helped.   She had difficulty getting a message across to me as she is not able to use [employer] as her password never works, also it was a Friday.   I will provide her a script for zpack and medrol dose pack in case of recurrence.          Current medicines (including changes today)  Current Outpatient Medications   Medication Sig Dispense Refill   • losartan (COZAAR) 50 MG Tab Take 1 tablet by mouth every day. 90 tablet 3   • azithromycin (ZITHROMAX) 250 MG Tab Take 2  "tabs day one and one tablet daily day 2-4 6 tablet 0   • methylPREDNISolone (MEDROL DOSEPAK) 4 MG Tablet Therapy Pack As directed on the packaging label. 21 tablet 0   • furosemide (LASIX) 20 MG Tab TAKE 1 TABLET BY MOUTH  TWICE DAILY 180 tablet 3   • COMBIVENT RESPIMAT  MCG/ACT Aero Soln USE 1 INHALATION BY MOUTH 4 TIMES DAILY 12 g 2   • felodipine (PLENDIL) 5 MG TABLET SR 24 HR TAKE 2 TABLETS BY MOUTH  DAILY 200 Tab 1   • raloxifene (EVISTA) 60 MG Tab TAKE 1 TABLET BY MOUTH  DAILY 100 Tab 1   • omeprazole (PRILOSEC) 20 MG delayed-release capsule TAKE 1 CAPSULE BY MOUTH  DAILY 100 Cap 1   • Magnesium 400 MG Cap Take  by mouth.     • buPROPion SR (WELLBUTRIN-SR) 150 MG TABLET SR 12 HR sustained-release tablet TAKE 1 TABLET BY MOUTH  DAILY 90 Tab 1   • rosuvastatin (CRESTOR) 20 MG Tab Take 1 Tab by mouth every evening. 90 Tab 3   • allopurinol (ZYLOPRIM) 100 MG Tab      • Misc. Devices Misc Portable oxygen concentrator: use daily. 1 Each 0   • clopidogrel (PLAVIX) 75 MG Tab Take 1 Tab by mouth every day. 90 Tab 1   • albuterol (PROVENTIL) 2.5mg/3ml Nebu Soln solution for nebulization 3 mL by Nebulization route every four hours as needed for Shortness of Breath. 75 mL 11     No current facility-administered medications for this visit.     She  has a past medical history of Arthritis, Asthma, Cataract, CKD (chronic kidney disease) stage 4, GFR 15-29 ml/min ([facility]) (7/1/2013), COPD, Dental disorder, Heart burn, History of anemia, Hypercholesteremia, Hypertension, Indigestion, Ovarian neoplasm (8/10/2017), Pneumonia (? 2012), Pulmonary emphysema ([facility]), Stroke ([facility]) (2016), and Vitamin d deficiency (7/1/2013).    ROS  No chest pain, no shortness of breath, no abdominal pain       Objective:     /70   Pulse 78   Temp 36.3 °C (97.4 °F) (Temporal)   Resp 16   Ht 1.626 m (5' 4\")   Wt 81.2 kg (179 lb)   SpO2 89%  Body mass index is 30.73 kg/m².   Physical Exam:  Constitutional: Alert, no distress.  Skin: Warm, " dry, good turgor, no rashes in visible areas.  Eye: Equal, round and reactive, conjunctiva clear, lids normal.  ENMT: Lips without lesions, good dentition, oropharynx clear.  Neck: Trachea midline, no masses, no thyromegaly. No cervical or supraclavicular lymphadenopathy  Respiratory: Unlabored respiratory effort, lungs clear to auscultation, no wheezes, no ronchi.  Cardiovascular: Normal S1, S2, no murmur, no edema.  Abdomen: Soft, non-tender, no masses, no hepatosplenomegaly.  Psych: Alert and oriented x3, normal affect and mood.        Assessment and Plan:   The following treatment plan was discussed    1. Essential hypertension      2. Stage 3b chronic kidney disease    - REFERRAL TO NEPHROLOGY  - Comp Metabolic Panel; Future    3. Coronary artery disease involving native heart without angina pectoris, unspecified vessel or lesion type      4. Dyslipidemia    - Lipid Profile; Future  - Comp Metabolic Panel; Future    5. Macrocytic anemia    - VITAMIN B12; Future  - FOLATE; Future    6. Other emphysema (HCC)        Followup: Return in about 6 months (around 10/15/2021) for High triglycerides, emphysema, macrocytic anemia , recheck blood pressure end of visit.

## 2021-04-15 NOTE — ASSESSMENT & PLAN NOTE
Chronic condition, stable.   Is on losartan 25 mg  Advised to Avoids NSAIDS, stay well hydrated, avoid high salt diet.

## 2021-04-15 NOTE — ASSESSMENT & PLAN NOTE
Improved TC and LDL but TG has elevated.   She is on crestor 20mg   Discussed addition of zetia to improve TG to lower cardiovascular risk.   Doing more carb with meals on wheels.   Will recheck labs in 6 months.

## 2021-04-15 NOTE — ASSESSMENT & PLAN NOTE
Chronic condition, she has no chest pain with exertion  She does not smoke.   She is stable on medical management: Felodipine 5 mg SR calcium channel blocker, losartan 50 mg, Lasix 20 mg, Plavix 75 mg and rosuvastatin 20 mg  She continues to stay active with the help of home health.

## 2021-04-15 NOTE — ASSESSMENT & PLAN NOTE
Patient has chronic hypertension improved control with increase in blood pressure medication patient is currently on losartan 25 mg Lasix 20 mg  Home health helps her to check her blood pressures recent numbers look good 120s to 130s systolic 50s to 60s diastolic occasional systolic of 142-144  Today in clinic slightly elevated 142/70 heart rate is 78 we will recheck at the end of the clinic visit.  Patient has no chest pain or swelling of the legs  Renal function reviewed CKD stage IIIb stable

## 2021-04-16 LAB — 25(OH)D3 SERPL-MCNC: 14 NG/ML (ref 30–80)

## 2021-05-12 ENCOUNTER — HOSPITAL ENCOUNTER (OUTPATIENT)
Facility: MEDICAL CENTER | Age: 71
End: 2021-05-12
Attending: INTERNAL MEDICINE
Payer: MEDICARE

## 2021-05-12 PROCEDURE — 81001 URINALYSIS AUTO W/SCOPE: CPT

## 2021-05-12 PROCEDURE — 87086 URINE CULTURE/COLONY COUNT: CPT

## 2021-05-12 PROCEDURE — 87077 CULTURE AEROBIC IDENTIFY: CPT

## 2021-05-12 PROCEDURE — 87186 SC STD MICRODIL/AGAR DIL: CPT

## 2021-05-13 LAB
APPEARANCE UR: CLEAR
BACTERIA #/AREA URNS HPF: ABNORMAL /HPF
BILIRUB UR QL STRIP.AUTO: NEGATIVE
COLOR UR: YELLOW
EPI CELLS #/AREA URNS HPF: NEGATIVE /HPF
GLUCOSE UR STRIP.AUTO-MCNC: NEGATIVE MG/DL
HYALINE CASTS #/AREA URNS LPF: ABNORMAL /LPF
KETONES UR STRIP.AUTO-MCNC: NEGATIVE MG/DL
LEUKOCYTE ESTERASE UR QL STRIP.AUTO: ABNORMAL
MICRO URNS: ABNORMAL
NITRITE UR QL STRIP.AUTO: NEGATIVE
PH UR STRIP.AUTO: 6.5 [PH] (ref 5–8)
PROT UR QL STRIP: NEGATIVE MG/DL
RBC # URNS HPF: ABNORMAL /HPF
RBC UR QL AUTO: NEGATIVE
SP GR UR STRIP.AUTO: 1.01
UROBILINOGEN UR STRIP.AUTO-MCNC: 0.2 MG/DL
WBC #/AREA URNS HPF: ABNORMAL /HPF

## 2021-05-15 LAB
BACTERIA UR CULT: ABNORMAL
BACTERIA UR CULT: ABNORMAL
SIGNIFICANT IND 70042: ABNORMAL
SITE SITE: ABNORMAL
SOURCE SOURCE: ABNORMAL

## 2021-05-18 ENCOUNTER — TELEMEDICINE ORIGINATING SITE VISIT (OUTPATIENT)
Dept: MEDICAL GROUP | Facility: PHYSICIAN GROUP | Age: 71
End: 2021-05-18
Payer: MEDICARE

## 2021-05-18 ENCOUNTER — TELEMEDICINE2 (OUTPATIENT)
Dept: NEPHROLOGY | Facility: MEDICAL CENTER | Age: 71
End: 2021-05-18
Payer: MEDICARE

## 2021-05-18 VITALS
HEART RATE: 90 BPM | DIASTOLIC BLOOD PRESSURE: 60 MMHG | WEIGHT: 183 LBS | OXYGEN SATURATION: 95 % | TEMPERATURE: 97 F | RESPIRATION RATE: 16 BRPM | BODY MASS INDEX: 31.24 KG/M2 | SYSTOLIC BLOOD PRESSURE: 156 MMHG | HEIGHT: 64 IN

## 2021-05-18 DIAGNOSIS — N18.30 STAGE 3 CHRONIC KIDNEY DISEASE, UNSPECIFIED WHETHER STAGE 3A OR 3B CKD: ICD-10-CM

## 2021-05-18 DIAGNOSIS — I10 ESSENTIAL HYPERTENSION: ICD-10-CM

## 2021-05-18 PROCEDURE — 99214 OFFICE O/P EST MOD 30 MIN: CPT | Mod: 95 | Performed by: INTERNAL MEDICINE

## 2021-05-18 ASSESSMENT — ENCOUNTER SYMPTOMS
VOMITING: 0
NAUSEA: 0
COUGH: 0
CHILLS: 0
SHORTNESS OF BREATH: 0
FEVER: 0

## 2021-05-18 ASSESSMENT — FIBROSIS 4 INDEX: FIB4 SCORE: 2.93

## 2021-05-18 NOTE — PROGRESS NOTES
Telemedicine: Established Patient   This evaluation was conducted via [employer] using secure and encrypted videoconferencing technology. The patient was physically located at [facility] in [place], [place]. The patient was presented by a medical professional at the originating site.   The patient's identity was confirmed and verbal consent was obtained for this telemedicine encounter.    Subjective:   CC:   Chief Complaint   Patient presents with   • Hypertension   • Chronic Kidney Disease       Lauren Bowden is a 71 y.o. female presenting for evaluation and management of:CKD  Patient has a history of chronic kidney disease stage III, kidney function has been stable, no hematuria no dysuria, no recent use of NSAIDs.  She has a long history of hypertension, blood pressure is fluctuating, no chest pain no shortness of breath, I advised the patient to be on low-sodium diet, her losartan was recently increased to 50 mg daily.      Review of Systems   Constitutional: Negative for chills, fever and malaise/fatigue.   Respiratory: Negative for cough and shortness of breath.    Cardiovascular: Negative for chest pain and leg swelling.   Gastrointestinal: Negative for nausea and vomiting.   Genitourinary: Negative for dysuria, frequency and urgency.         No Known Allergies    Current medicines (including changes today)  Current Outpatient Medications   Medication Sig Dispense Refill   • furosemide (LASIX) 20 MG Tab TAKE 1 TABLET BY MOUTH  TWICE DAILY 180 tablet 3   • COMBIVENT RESPIMAT  MCG/ACT Aero Soln USE 1 INHALATION BY MOUTH 4 TIMES DAILY 12 g 2   • felodipine (PLENDIL) 5 MG TABLET SR 24 HR TAKE 2 TABLETS BY MOUTH  DAILY 200 Tab 1   • raloxifene (EVISTA) 60 MG Tab TAKE 1 TABLET BY MOUTH  DAILY 100 Tab 1   • omeprazole (PRILOSEC) 20 MG delayed-release capsule TAKE 1 CAPSULE BY MOUTH  DAILY 100 Cap 1   • Magnesium 400 MG Cap Take  by mouth.     • buPROPion SR (WELLBUTRIN-SR) 150 MG TABLET SR 12 HR  sustained-release tablet TAKE 1 TABLET BY MOUTH  DAILY 90 Tab 1   • rosuvastatin (CRESTOR) 20 MG Tab Take 1 Tab by mouth every evening. 90 Tab 3   • allopurinol (ZYLOPRIM) 100 MG Tab      • clopidogrel (PLAVIX) 75 MG Tab Take 1 Tab by mouth every day. 90 Tab 1   • albuterol (PROVENTIL) 2.5mg/3ml Nebu Soln solution for nebulization 3 mL by Nebulization route every four hours as needed for Shortness of Breath. 75 mL 11   • losartan (COZAAR) 50 MG Tab Take 1 tablet by mouth every day. 90 tablet 3   • azithromycin (ZITHROMAX) 250 MG Tab Take 2 tabs day one and one tablet daily day 2-4 (Patient not taking: Reported on 2021) 6 tablet 0   • methylPREDNISolone (MEDROL DOSEPAK) 4 MG Tablet Therapy Pack As directed on the packaging label. (Patient not taking: Reported on 2021) 21 tablet 0   • Misc. Devices Misc Portable oxygen concentrator: use daily. 1 Each 0     No current facility-administered medications for this visit.       Patient Active Problem List    Diagnosis Date Noted   • Dyslipidemia 04/15/2021   • History of tobacco abuse (Quit >6 mos ago) 2021   • Spouse  2021   • Debility 2020   • Encounter for breast cancer screening using non-mammogram modality 2020   • Muscle cramping 2020   • Acute pain of left shoulder 2019   • Generalized abdominal pain 2019   • Chronic gout of right foot 10/11/2018   • Other emphysema (HCC) 2018   • Colon cancer screening 2018   • TIA (transient ischemic attack) 2018   • Obesity (BMI 30-39.9) 2018   • Stage 3 chronic kidney disease (HCC) 2017   • Lung nodule, multiple 2017   • Chronic respiratory failure with hypoxia (HCC) 2010   • CAD (coronary artery disease) 2010   • HTN (hypertension) 2010       Family History   Problem Relation Age of Onset   • Diabetes Sister    • Cancer Sister        She  has a past medical history of Arthritis, Asthma, Cataract, CKD (chronic kidney  "disease) stage 4, GFR 15-29 ml/min ([facility]) (7/1/2013), COPD, Dental disorder, Heart burn, History of anemia, Hypercholesteremia, Hypertension, Indigestion, Ovarian neoplasm (8/10/2017), Pneumonia (? 2012), Pulmonary emphysema (HCC), Stroke ([facility]) (2016), and Vitamin d deficiency (7/1/2013).  She  has a past surgical history that includes appendectomy; cataract phaco with iol (Left, 1/15/2019); cataract phaco with iol (Right, 1/29/2019); mini laparotomy (N/A, 2/7/2019); abdominal hysterectomy total (N/A, 2/7/2019); and salpingo oophorectomy (Bilateral, 2/7/2019).       Objective:   /60 (BP Location: Left arm, Patient Position: Sitting)   Pulse 90   Temp 36.1 °C (97 °F) (Temporal)   Resp 16   Ht 1.626 m (5' 4\")   Wt 83 kg (183 lb)   SpO2 95%   BMI 31.41 kg/m²     Physical Exam    Physical Exam:  Constitutional: Alert, no distress, well-groomed.  Skin: No rashes in visible areas.  Eye: Round. Conjunctiva clear, lids normal. No icterus.   ENMT: Lips pink without lesions, good dentition, moist mucous membranes. Phonation normal.  Neck: No masses, no thyromegaly. Moves freely without pain.  CV: Pulse as reported by patient  Respiratory: Unlabored respiratory effort, no cough or audible wheeze  Psych: Alert and oriented x3, normal affect and mood.   Past Medical History:   Diagnosis Date   • Arthritis     all over, history of gout both feet, great toes   • Asthma     daily inhalers   • Cataract    • CKD (chronic kidney disease) stage 4, GFR 15-29 ml/min ([facility]) 7/1/2013    Did see Dr. Perkins; current GFR 27 6/26/13; Has both kidneys, only one is functioning.   • COPD    • Dental disorder     lower partial   • Heart burn    • History of anemia    • Hypercholesteremia    • Hypertension    • Indigestion    • Ovarian neoplasm 8/10/2017    Incidental finding of the CT Renal  - 6.8 x 6.8 x 6 cm cystic left adnexal lesion may represent an ovarian cystadenoma or cystadenocarcinoma.     • Pneumonia ? 2012   • Pulmonary " emphysema (HCC)     Oxygen as needed, through [employer], will be switching to [employer]   • Stroke (HCC) 2016    pt states it was a TIA no residual on plavix   • Vitamin d deficiency 7/1/2013    Was taking 2000 - still 26; incr to 4000     Social History     Socioeconomic History   • Marital status:      Spouse name: Not on file   • Number of children: Not on file   • Years of education: Not on file   • Highest education level: Not on file   Occupational History   • Not on file   Tobacco Use   • Smoking status: Former Smoker     Packs/day: 1.00     Years: 40.00     Pack years: 40.00     Types: Cigarettes     Quit date: 10/1/2010     Years since quitting: 10.6   • Smokeless tobacco: Never Used   Substance and Sexual Activity   • Alcohol use: Yes     Alcohol/week: 2.4 oz     Types: 2 Shots of liquor, 2 Cans of beer per week     Comment: 2 a day   • Drug use: No   • Sexual activity: Never     Partners: Male     Comment:    Other Topics Concern   • Not on file   Social History Narrative   • Not on file     Social Determinants of Health     Financial Resource Strain:    • Difficulty of Paying Living Expenses:    Food Insecurity:    • Worried About Running Out of Food in the Last Year:    • Ran Out of Food in the Last Year:    Transportation Needs:    • Lack of Transportation (Medical):    • Lack of Transportation (Non-Medical):    Physical Activity:    • Days of Exercise per Week:    • Minutes of Exercise per Session:    Stress:    • Feeling of Stress :    Social Connections:    • Frequency of Communication with Friends and Family:    • Frequency of Social Gatherings with Friends and Family:    • Attends Zoroastrianism Services:    • Active Member of Clubs or Organizations:    • Attends Club or Organization Meetings:    • Marital Status:    Intimate Partner Violence:    • Fear of Current or Ex-Partner:    • Emotionally Abused:    • Physically Abused:    • Sexually Abused:      Family History   Problem Relation  Age of Onset   • Diabetes Sister    • Cancer Sister      Recent Labs     04/14/21  0730   ALBUMIN 3.5   HDL 50   TRIGLYCERIDE 314*   SODIUM 140   POTASSIUM 4.7   CHLORIDE 106   CO2 23   BUN 14   CREATININE 1.47*       Assessment and Plan:   The following treatment plan was discussed:     1. Stage 3 chronic kidney disease, unspecified whether stage 3a or 3b CKD  Stable  No uremic symptoms  Renal dose of medication  Avoid nephrotoxins  Continue same medication regimen  Check labs annually    2. Essential hypertension  Uncontrolled  I advised the patient to be on low-sodium diet  Check blood pressure at home regularly  If blood pressure still elevated we will increase losartan to 100 mg daily        Follow-up: Return in about 1 year (around 5/18/2022).

## 2021-06-02 DIAGNOSIS — I10 ESSENTIAL HYPERTENSION: ICD-10-CM

## 2021-06-02 DIAGNOSIS — I15.9 SECONDARY HYPERTENSION: ICD-10-CM

## 2021-06-02 DIAGNOSIS — I25.10 CORONARY ARTERY DISEASE INVOLVING NATIVE HEART WITHOUT ANGINA PECTORIS, UNSPECIFIED VESSEL OR LESION TYPE: ICD-10-CM

## 2021-06-02 RX ORDER — CLOPIDOGREL BISULFATE 75 MG/1
75 TABLET ORAL
Qty: 90 TABLET | Refills: 3 | Status: SHIPPED | OUTPATIENT
Start: 2021-06-02 | End: 2022-04-13

## 2021-06-02 NOTE — TELEPHONE ENCOUNTER
Lauren Thea Bowden  [phone] (home)     Reason for call: referrel      Pts home health nurse called in to report that pt has had episodes of tachycardia and is concerned about having AFIB.   Pt would like a referrel to Cardiology.     Thank you.

## 2021-06-02 NOTE — TELEPHONE ENCOUNTER
Received request via: Patient    Was the patient seen in the last year in this department? Yes    Does the patient have an active prescription (recently filled or refills available) for medication(s) requested? No    Last OV:05/18/21  Last Labs: 05/12/21    Current Outpatient Medications   Medication Sig Dispense Refill   • losartan (COZAAR) 50 MG Tab Take 1 tablet by mouth every day. 90 tablet 3   • azithromycin (ZITHROMAX) 250 MG Tab Take 2 tabs day one and one tablet daily day 2-4 (Patient not taking: Reported on 5/18/2021) 6 tablet 0   • methylPREDNISolone (MEDROL DOSEPAK) 4 MG Tablet Therapy Pack As directed on the packaging label. (Patient not taking: Reported on 5/18/2021) 21 tablet 0   • furosemide (LASIX) 20 MG Tab TAKE 1 TABLET BY MOUTH  TWICE DAILY 180 tablet 3   • COMBIVENT RESPIMAT  MCG/ACT Aero Soln USE 1 INHALATION BY MOUTH 4 TIMES DAILY 12 g 2   • felodipine (PLENDIL) 5 MG TABLET SR 24 HR TAKE 2 TABLETS BY MOUTH  DAILY 200 Tab 1   • raloxifene (EVISTA) 60 MG Tab TAKE 1 TABLET BY MOUTH  DAILY 100 Tab 1   • omeprazole (PRILOSEC) 20 MG delayed-release capsule TAKE 1 CAPSULE BY MOUTH  DAILY 100 Cap 1   • Magnesium 400 MG Cap Take  by mouth.     • buPROPion SR (WELLBUTRIN-SR) 150 MG TABLET SR 12 HR sustained-release tablet TAKE 1 TABLET BY MOUTH  DAILY 90 Tab 1   • rosuvastatin (CRESTOR) 20 MG Tab Take 1 Tab by mouth every evening. 90 Tab 3   • allopurinol (ZYLOPRIM) 100 MG Tab      • Misc. Devices Misc Portable oxygen concentrator: use daily. 1 Each 0   • clopidogrel (PLAVIX) 75 MG Tab Take 1 Tab by mouth every day. 90 Tab 1   • albuterol (PROVENTIL) 2.5mg/3ml Nebu Soln solution for nebulization 3 mL by Nebulization route every four hours as needed for Shortness of Breath. 75 mL 11     No current facility-administered medications for this visit.

## 2021-06-03 NOTE — TELEPHONE ENCOUNTER
Please advise patient referral to cardiology done.   She should come to urgent care or make an appointment to be evaluated for palpitations.   If chest pain, shortness of breath or severe fatigue or other symptoms should be evaluated in the ER.   Maisha Bay M.D.

## 2021-06-07 ENCOUNTER — OFFICE VISIT (OUTPATIENT)
Dept: CARDIOLOGY | Facility: MEDICAL CENTER | Age: 71
End: 2021-06-07
Attending: FAMILY MEDICINE
Payer: MEDICARE

## 2021-06-07 VITALS
OXYGEN SATURATION: 90 % | SYSTOLIC BLOOD PRESSURE: 106 MMHG | BODY MASS INDEX: 31.55 KG/M2 | HEART RATE: 92 BPM | RESPIRATION RATE: 16 BRPM | HEIGHT: 64 IN | DIASTOLIC BLOOD PRESSURE: 60 MMHG | WEIGHT: 184.8 LBS

## 2021-06-07 DIAGNOSIS — I10 ESSENTIAL HYPERTENSION: ICD-10-CM

## 2021-06-07 DIAGNOSIS — J44.9 CHRONIC OBSTRUCTIVE PULMONARY DISEASE, UNSPECIFIED COPD TYPE (HCC): ICD-10-CM

## 2021-06-07 DIAGNOSIS — N18.32 STAGE 3B CHRONIC KIDNEY DISEASE: ICD-10-CM

## 2021-06-07 DIAGNOSIS — E78.5 DYSLIPIDEMIA: ICD-10-CM

## 2021-06-07 DIAGNOSIS — G45.9 TIA (TRANSIENT ISCHEMIC ATTACK): ICD-10-CM

## 2021-06-07 DIAGNOSIS — R00.2 PALPITATIONS: ICD-10-CM

## 2021-06-07 LAB — EKG IMPRESSION: NORMAL

## 2021-06-07 PROCEDURE — 99204 OFFICE O/P NEW MOD 45 MIN: CPT | Mod: 25 | Performed by: INTERNAL MEDICINE

## 2021-06-07 PROCEDURE — 93000 ELECTROCARDIOGRAM COMPLETE: CPT | Performed by: INTERNAL MEDICINE

## 2021-06-07 RX ORDER — LISINOPRIL 10 MG/1
10 TABLET ORAL DAILY
COMMUNITY
End: 2021-10-20

## 2021-06-07 RX ORDER — CYCLOBENZAPRINE HCL 10 MG
10 TABLET ORAL 3 TIMES DAILY PRN
COMMUNITY

## 2021-06-07 ASSESSMENT — ENCOUNTER SYMPTOMS
ORTHOPNEA: 0
LIGHT-HEADEDNESS: 0
FLANK PAIN: 0
NAUSEA: 0
FEVER: 0
EXCESSIVE DAYTIME SLEEPINESS: 0
SYNCOPE: 0
NEAR-SYNCOPE: 0
BLOATING: 0
MYALGIAS: 0
DOUBLE VISION: 0
IRREGULAR HEARTBEAT: 0
LOSS OF BALANCE: 0
WHEEZING: 0
SHORTNESS OF BREATH: 1
DECREASED APPETITE: 0
DIAPHORESIS: 0
PND: 0
COUGH: 0
PALPITATIONS: 1
HEADACHES: 0
SLEEP DISTURBANCES DUE TO BREATHING: 0
PARESTHESIAS: 0
VOMITING: 0
BACK PAIN: 0
DIZZINESS: 0
NIGHT SWEATS: 0
NUMBNESS: 0
WEAKNESS: 0
FALLS: 0
BLURRED VISION: 0
DIARRHEA: 0
CONSTIPATION: 0
SORE THROAT: 0
DYSPNEA ON EXERTION: 0

## 2021-06-07 ASSESSMENT — FIBROSIS 4 INDEX: FIB4 SCORE: 2.93

## 2021-06-07 NOTE — PROGRESS NOTES
Cardiology Initial Consultation Note    Date of note:    6/7/2021    Primary Care Provider: Maisha Bay M.D.  Referring Provider: Maisha Bay M.D.     Patient Name: Lauren Bowden   YOB: 1950  MRN:              [id number]    Chief Complaint   Patient presents with   • Coronary Artery Disease     NP Dx: CAD (coronary artery disease)       History of Present Illness: Ms. Lauren Bowden is a 71 y.o. female whose current medical problems include HTN, COPD, CKD stage III, hyperlipidemia and TIA who is here for cardiac consultation for palpitations.    Patient states that she had a TIA several years ago and has been on plavix since then.  Does have occasional palpitations with associated lightheadedness.  Her home health nurse questioned if she had Afib because of varying pulse.  Has a pulse ox at home and notices that her HR will go from 80 to 116 within 1-2 minutes while resting.    In terms of physical activity, she walks and does her ADLs without any concerning cardiovascular symptoms.  Does have COPD and has symptoms associated to that but denies chest pain/pressure, dizziness, orthopnea or PND.    Cardiovascular Risk Factors:  1. Smoking status: Former smoker, quit 2010  2. Type II Diabetes Mellitus: No   3. Hypertension: Yes  4. Dyslipidemia: Yes   Cholesterol,Tot   Date Value Ref Range Status   04/14/2021 169 100 - 199 mg/dL Final     LDL   Date Value Ref Range Status   04/14/2021 56 <100 mg/dL Final     HDL   Date Value Ref Range Status   04/14/2021 50 >=40 mg/dL Final     Triglycerides   Date Value Ref Range Status   04/14/2021 314 (H) 0 - 149 mg/dL Final     5. Family history of early Coronary Artery Disease in a first degree relative (Male less than 55 years of age; Female less than 65 years of age): Father had history of MI  6.  Obesity and/or Metabolic Syndrome: BMI 32  7. Sedentary lifestyle: No    Review of Systems   Constitutional: Negative for decreased appetite,  diaphoresis, fever, malaise/fatigue and night sweats.   HENT: Negative for congestion and sore throat.    Eyes: Negative for blurred vision and double vision.   Cardiovascular: Positive for palpitations. Negative for chest pain, cyanosis, dyspnea on exertion, irregular heartbeat, leg swelling, near-syncope, orthopnea, paroxysmal nocturnal dyspnea and syncope.   Respiratory: Positive for shortness of breath. Negative for cough, sleep disturbances due to breathing and wheezing.    Endocrine: Negative for cold intolerance and heat intolerance.   Musculoskeletal: Negative for back pain, falls and myalgias.   Gastrointestinal: Negative for bloating, constipation, diarrhea, nausea and vomiting.   Genitourinary: Negative for dysuria and flank pain.   Neurological: Negative for excessive daytime sleepiness, dizziness, headaches, light-headedness, loss of balance, numbness, paresthesias and weakness.         Past Medical History:   Diagnosis Date   • Arthritis     all over, history of gout both feet, great toes   • Asthma     daily inhalers   • Cataract    • CKD (chronic kidney disease) stage 4, GFR 15-29 ml/min ([facility]) 7/1/2013    Did see Dr. Perkins; current GFR 27 6/26/13; Has both kidneys, only one is functioning.   • COPD    • Dental disorder     lower partial   • Heart burn    • History of anemia    • Hypercholesteremia    • Hypertension    • Indigestion    • Ovarian neoplasm 8/10/2017    Incidental finding of the CT Renal  - 6.8 x 6.8 x 6 cm cystic left adnexal lesion may represent an ovarian cystadenoma or cystadenocarcinoma.     • Pneumonia ? 2012   • Pulmonary emphysema ([facility])     Oxygen as needed, through [employer], will be switching to [facility]   • Stroke ([facility]) 2016    pt states it was a TIA no residual on plavix   • Vitamin d deficiency 7/1/2013    Was taking 2000 - still 26; incr to 4000         Past Surgical History:   Procedure Laterality Date   • MINI LAPAROTOMY N/A 2/7/2019    Procedure: MINI LAPAROTOMY-  EXPLORATORY;  Surgeon: Henrry Centeno M.D.;  Location: SURGERY SAME DAY [facility] ORS;  Service: Gynecology   • ABDOMINAL HYSTERECTOMY TOTAL N/A 2/7/2019    Procedure: ABDOMINAL HYSTERECTOMY TOTAL;  Surgeon: Henrry Centeno M.D.;  Location: SURGERY SAME DAY [facility] ORS;  Service: Gynecology   • SALPINGO OOPHORECTOMY Bilateral 2/7/2019    Procedure: SALPINGO OOPHORECTOMY;  Surgeon: Henrry Centeno M.D.;  Location: SURGERY SAME DAY [facility] ORS;  Service: Gynecology   • CATARACT PHACO WITH IOL Right 1/29/2019    Procedure: CATARACT PHACO WITH IOL;  Surgeon: Xander Hemphill M.D.;  Location: SURGERY SAME DAY [facility] ORS;  Service: Ophthalmology   • CATARACT PHACO WITH IOL Left 1/15/2019    Procedure: CATARACT PHACO WITH IOL;  Surgeon: Xander Hemphill M.D.;  Location: SURGERY SAME DAY [facility] ORS;  Service: Ophthalmology   • APPENDECTOMY      years ago, ?2000         Current Outpatient Medications   Medication Sig Dispense Refill   • lisinopril (PRINIVIL) 10 MG Tab Take 10 mg by mouth every day.     • cyclobenzaprine (FLEXERIL) 10 mg Tab Take 10 mg by mouth 3 times a day as needed.     • clopidogrel (PLAVIX) 75 MG Tab Take 1 tablet by mouth every day. 90 tablet 3   • losartan (COZAAR) 50 MG Tab Take 1 tablet by mouth every day. 90 tablet 3   • azithromycin (ZITHROMAX) 250 MG Tab Take 2 tabs day one and one tablet daily day 2-4 6 tablet 0   • methylPREDNISolone (MEDROL DOSEPAK) 4 MG Tablet Therapy Pack As directed on the packaging label. 21 tablet 0   • furosemide (LASIX) 20 MG Tab TAKE 1 TABLET BY MOUTH  TWICE DAILY 180 tablet 3   • COMBIVENT RESPIMAT  MCG/ACT Aero Soln USE 1 INHALATION BY MOUTH 4 TIMES DAILY 12 g 2   • raloxifene (EVISTA) 60 MG Tab TAKE 1 TABLET BY MOUTH  DAILY 100 Tab 1   • omeprazole (PRILOSEC) 20 MG delayed-release capsule TAKE 1 CAPSULE BY MOUTH  DAILY 100 Cap 1   • Magnesium 400 MG Cap Take  by mouth.     • buPROPion SR (WELLBUTRIN-SR) 150 MG TABLET SR 12 HR sustained-release tablet  TAKE 1 TABLET BY MOUTH  DAILY 90 Tab 1   • rosuvastatin (CRESTOR) 20 MG Tab Take 1 Tab by mouth every evening. 90 Tab 3   • Misc. Devices Misc Portable oxygen concentrator: use daily. 1 Each 0   • albuterol (PROVENTIL) 2.5mg/3ml Nebu Soln solution for nebulization 3 mL by Nebulization route every four hours as needed for Shortness of Breath. 75 mL 11   • felodipine (PLENDIL) 5 MG TABLET SR 24 HR TAKE 2 TABLETS BY MOUTH  DAILY 200 Tab 1   • allopurinol (ZYLOPRIM) 100 MG Tab  (Patient not taking: Reported on 6/7/2021)       No current facility-administered medications for this visit.         No Known Allergies      Family History   Problem Relation Age of Onset   • Diabetes Sister    • Cancer Sister          Social History     Socioeconomic History   • Marital status:      Spouse name: Not on file   • Number of children: Not on file   • Years of education: Not on file   • Highest education level: Not on file   Occupational History   • Not on file   Tobacco Use   • Smoking status: Former Smoker     Packs/day: 1.00     Years: 40.00     Pack years: 40.00     Types: Cigarettes     Quit date: 10/1/2010     Years since quitting: 10.6   • Smokeless tobacco: Never Used   Substance and Sexual Activity   • Alcohol use: Yes     Alcohol/week: 2.4 oz     Types: 2 Shots of liquor, 2 Cans of beer per week     Comment: 2 a day   • Drug use: No   • Sexual activity: Never     Partners: Male     Comment:    Other Topics Concern   • Not on file   Social History Narrative   • Not on file     Social Determinants of Health     Financial Resource Strain:    • Difficulty of Paying Living Expenses:    Food Insecurity:    • Worried About Running Out of Food in the Last Year:    • Ran Out of Food in the Last Year:    Transportation Needs:    • Lack of Transportation (Medical):    • Lack of Transportation (Non-Medical):    Physical Activity:    • Days of Exercise per Week:    • Minutes of Exercise per Session:    Stress:    •  "Feeling of Stress :    Social Connections:    • Frequency of Communication with Friends and Family:    • Frequency of Social Gatherings with Friends and Family:    • Attends [employer] Services:    • Active Member of Clubs or Organizations:    • Attends Club or Organization Meetings:    • Marital Status:    Intimate Partner Violence:    • Fear of Current or Ex-Partner:    • Emotionally Abused:    • Physically Abused:    • Sexually Abused:          Physical Exam:  Ambulatory Vitals  /60 (BP Location: Left arm, Patient Position: Sitting, BP Cuff Size: Adult)   Pulse 92   Resp 16   Ht 1.626 m (5' 4\")   Wt 83.8 kg (184 lb 12.8 oz)   SpO2 90%    Oxygen Therapy:  Pulse Oximetry: 90 %  BP Readings from Last 4 Encounters:   06/07/21 106/60   05/18/21 156/60   04/15/21 142/70   01/27/21 135/82       Weight/BMI: Body mass index is 31.72 kg/m².  Wt Readings from Last 4 Encounters:   06/07/21 83.8 kg (184 lb 12.8 oz)   05/18/21 83 kg (183 lb)   04/15/21 81.2 kg (179 lb)   02/04/21 88.5 kg (195 lb)         General: Well appearing and in no apparent distress  Eyes: nl conjunctiva, no icteric sclera  ENT: wearing a mask, normal external appearance of ears  Neck: no visible JVP,  no carotid bruits  Lungs: normal respiratory effort, CTAB  Heart: RRR, no murmurs, no rubs or gallops,  no edema bilateral lower extremities. No LV/RV heave on cardiac palpatation. 2+ bilateral radial pulses.  2+ bilateral dp pulses.   Abdomen: soft, non tender, non distended, no masses, normal bowel sounds.  No HSM.  Extremities/MSK: no clubbing, no cyanosis  Neurological: No focal sensory deficits  Psychiatric: Appropriate affect, A/O x 3, intact judgement and insight  Skin: Warm extremities      Lab Data Review:  Lab Results   Component Value Date/Time    CHOLSTRLTOT 169 04/14/2021 07:30 AM    LDL 56 04/14/2021 07:30 AM    HDL 50 04/14/2021 07:30 AM    TRIGLYCERIDE 314 (H) 04/14/2021 07:30 AM       Lab Results   Component Value Date/Time    " SODIUM 140 04/14/2021 07:30 AM    POTASSIUM 4.7 04/14/2021 07:30 AM    CHLORIDE 106 04/14/2021 07:30 AM    CO2 23 04/14/2021 07:30 AM    GLUCOSE 97 04/14/2021 07:30 AM    BUN 14 04/14/2021 07:30 AM    CREATININE 1.47 (H) 04/14/2021 07:30 AM    CREATININE 1.50 (H) 03/27/2013 12:00 AM    BUNCREATRAT 16 08/22/2013 10:28 AM    BUNCREATRAT 21 03/27/2013 12:00 AM     Lab Results   Component Value Date/Time    ALKPHOSPHAT 77 04/14/2021 07:30 AM    ASTSGOT 18 04/14/2021 07:30 AM    ALTSGPT 10 04/14/2021 07:30 AM    TBILIRUBIN 0.4 04/14/2021 07:30 AM      Lab Results   Component Value Date/Time    WBC 4.6 (L) 04/14/2021 07:30 AM    WBC 5.2 03/27/2013 12:00 AM         Cardiac Imaging and Procedures Review:    EKG dated 6/7/2021: My personal interpretation is sinus rhythm with heart rate 91 bpm    Nuclear Perfusion Imaging (2016):   Negative for ishemia      Assessment & Plan     1. Essential hypertension     2. Dyslipidemia     3. Palpitations  Cardiac Event Monitor   4. TIA (transient ischemic attack)  EKG    Cardiac Event Monitor   5. Stage 3b chronic kidney disease (HCC)     6. Chronic obstructive pulmonary disease, unspecified COPD type (HCC)           Shared Medical Decision Making:  Obtain 30 days cardiac event monitor to rule out any underlying arrhythmia associated with symptoms.  Encouraged patient to trigger the device and write down her symptoms to best correlate them with underlying rhythm to which she voices understanding.    Blood pressure well controlled.  She is currently taking losartan 50 mg and lisinopril 10 mg.  Discussed potential drug-drug interaction between losartan and lisinopril and recommend discontinuation of the lisinopril to which she voices understanding.  Has a prescription of felodipine 5 mg but is not taking it.  Patient will start felodipine and discontinue lisinopril.    For TIA, is on plavix 75 mg with no bleeding concerns.  Continue.     Lipid panel reviewed.  Continue crestor 20  mg.      All of patient's excellent questions were answered to the best of my knowledge and to her satisfaction.  It was a pleasure seeing Ms. Lauren Bowden in my clinic today.  Patient is aware to call the cardiology clinic with any questions or concerns.      Jt Ochoa MD  [facility], [address]  [address] [address]  Phone: [phone]  Fax: [phone]

## 2021-06-29 ENCOUNTER — NON-PROVIDER VISIT (OUTPATIENT)
Dept: CARDIOLOGY | Facility: MEDICAL CENTER | Age: 71
End: 2021-06-29
Payer: MEDICARE

## 2021-06-29 ENCOUNTER — TELEPHONE (OUTPATIENT)
Dept: CARDIOLOGY | Facility: MEDICAL CENTER | Age: 71
End: 2021-06-29

## 2021-06-29 DIAGNOSIS — I49.3 PVC (PREMATURE VENTRICULAR CONTRACTION): ICD-10-CM

## 2021-06-29 DIAGNOSIS — I25.10 CORONARY ARTERY DISEASE INVOLVING NATIVE CORONARY ARTERY OF NATIVE HEART WITHOUT ANGINA PECTORIS: ICD-10-CM

## 2021-06-29 DIAGNOSIS — R00.2 PALPITATIONS: ICD-10-CM

## 2021-06-29 RX ORDER — BUPROPION HYDROCHLORIDE 150 MG/1
TABLET, EXTENDED RELEASE ORAL
Qty: 90 TABLET | Refills: 1 | Status: SHIPPED | OUTPATIENT
Start: 2021-06-29 | End: 2022-01-26

## 2021-06-29 NOTE — TELEPHONE ENCOUNTER
Home enrollment completed for the 30 day [employer] MCT Heart monitoring program per Jt Ochoa MD.  Monitor to be shipped to patient by [employer].  >Pending Baseline.  >Pending EOS.

## 2021-07-09 NOTE — TELEPHONE ENCOUNTER
Problem: Skin Integrity  Goal: Skin integrity is maintained or improved  Outcome: Progressing     Problem: Pain - Standard  Goal: Alleviation of pain or a reduction in pain to the patient’s comfort goal  Outcome: Progressing     The patient is Watcher - Medium risk of patient condition declining or worsening            Has upcoming appt w/ PCP. Will send 1 and 3 months of fills to pharmacy.

## 2021-08-11 PROCEDURE — 93268 ECG RECORD/REVIEW: CPT | Performed by: INTERNAL MEDICINE

## 2021-09-03 DIAGNOSIS — K21.9 GERD WITHOUT ESOPHAGITIS: ICD-10-CM

## 2021-09-05 DIAGNOSIS — M81.0 OSTEOPOROSIS, UNSPECIFIED OSTEOPOROSIS TYPE, UNSPECIFIED PATHOLOGICAL FRACTURE PRESENCE: ICD-10-CM

## 2021-09-07 NOTE — TELEPHONE ENCOUNTER
Received request via: Pharmacy    Was the patient seen in the last year in this department? Yes    Does the patient have an active prescription (recently filled or refills available) for medication(s) requested? No    Requested Prescriptions     Pending Prescriptions Disp Refills    omeprazole (PRILOSEC) 20 MG delayed-release capsule [Pharmacy Med Name: Omeprazole 20 MG Oral Capsule Delayed Release] 90 Capsule 1     Sig: TAKE 1 CAPSULE BY MOUTH  DAILY

## 2021-09-07 NOTE — TELEPHONE ENCOUNTER
Received request via: Pharmacy    Was the patient seen in the last year in this department? Yes    Does the patient have an active prescription (recently filled or refills available) for medication(s) requested? No    Requested Prescriptions     Pending Prescriptions Disp Refills    felodipine (PLENDIL) 5 MG TABLET SR 24 HR [Pharmacy Med Name: FELODIPINE  5MG  TAB  EXTENDED RELEASE] 200 Tablet 0     Sig: TAKE 2 TABLETS BY MOUTH  DAILY    raloxifene (EVISTA) 60 MG Tab [Pharmacy Med Name: RALOXIFENE  60MG  TAB] 100 Tablet 0     Sig: TAKE 1 TABLET BY MOUTH  DAILY

## 2021-09-08 RX ORDER — FELODIPINE 5 MG/1
TABLET, EXTENDED RELEASE ORAL
Qty: 200 TABLET | Refills: 0 | Status: SHIPPED | OUTPATIENT
Start: 2021-09-08 | End: 2021-10-20

## 2021-09-08 RX ORDER — OMEPRAZOLE 20 MG/1
CAPSULE, DELAYED RELEASE ORAL
Qty: 90 CAPSULE | Refills: 1 | Status: SHIPPED | OUTPATIENT
Start: 2021-09-08 | End: 2022-03-03

## 2021-09-08 RX ORDER — RALOXIFENE HYDROCHLORIDE 60 MG/1
TABLET, FILM COATED ORAL
Qty: 100 TABLET | Refills: 0 | Status: SHIPPED | OUTPATIENT
Start: 2021-09-08 | End: 2021-11-08 | Stop reason: SDUPTHER

## 2021-10-12 RX ORDER — ROSUVASTATIN CALCIUM 20 MG/1
TABLET, COATED ORAL
Qty: 90 TABLET | Refills: 3 | Status: SHIPPED | OUTPATIENT
Start: 2021-10-12 | End: 2022-12-13

## 2021-10-12 NOTE — TELEPHONE ENCOUNTER
Received request via: Pharmacy    Was the patient seen in the last year in this department? Yes    Does the patient have an active prescription (recently filled or refills available) for medication(s) requested? No    Requested Prescriptions     Pending Prescriptions Disp Refills    rosuvastatin (CRESTOR) 20 MG Tab [Pharmacy Med Name: Rosuvastatin Calcium 20 MG Oral Tablet] 90 Tablet 3     Sig: TAKE 1 TABLET BY MOUTH IN  THE EVENING

## 2021-10-14 ENCOUNTER — HOSPITAL ENCOUNTER (OUTPATIENT)
Facility: MEDICAL CENTER | Age: 71
End: 2021-10-14
Attending: FAMILY MEDICINE
Payer: MEDICARE

## 2021-10-14 DIAGNOSIS — E78.5 DYSLIPIDEMIA: ICD-10-CM

## 2021-10-14 DIAGNOSIS — N18.32 STAGE 3B CHRONIC KIDNEY DISEASE: ICD-10-CM

## 2021-10-14 DIAGNOSIS — D53.9 MACROCYTIC ANEMIA: ICD-10-CM

## 2021-10-14 LAB
ALBUMIN SERPL BCP-MCNC: 4.1 G/DL (ref 3.2–4.9)
ALBUMIN/GLOB SERPL: 1.5 G/DL
ALP SERPL-CCNC: 89 U/L (ref 30–99)
ALT SERPL-CCNC: 13 U/L (ref 2–50)
ANION GAP SERPL CALC-SCNC: 13 MMOL/L (ref 7–16)
AST SERPL-CCNC: 18 U/L (ref 12–45)
BILIRUB SERPL-MCNC: 0.2 MG/DL (ref 0.1–1.5)
BUN SERPL-MCNC: 21 MG/DL (ref 8–22)
CALCIUM SERPL-MCNC: 9.3 MG/DL (ref 8.5–10.5)
CHLORIDE SERPL-SCNC: 105 MMOL/L (ref 96–112)
CHOLEST SERPL-MCNC: 205 MG/DL (ref 100–199)
CO2 SERPL-SCNC: 24 MMOL/L (ref 20–33)
CREAT SERPL-MCNC: 1.65 MG/DL (ref 0.5–1.4)
FASTING STATUS PATIENT QL REPORTED: NORMAL
FOLATE SERPL-MCNC: 7.3 NG/ML
GLOBULIN SER CALC-MCNC: 2.8 G/DL (ref 1.9–3.5)
GLUCOSE SERPL-MCNC: 97 MG/DL (ref 65–99)
HDLC SERPL-MCNC: 55 MG/DL
LDLC SERPL CALC-MCNC: 104 MG/DL
POTASSIUM SERPL-SCNC: 4.4 MMOL/L (ref 3.6–5.5)
PROT SERPL-MCNC: 6.9 G/DL (ref 6–8.2)
SODIUM SERPL-SCNC: 142 MMOL/L (ref 135–145)
TRIGL SERPL-MCNC: 228 MG/DL (ref 0–149)
VIT B12 SERPL-MCNC: <150 PG/ML (ref 211–911)

## 2021-10-14 PROCEDURE — 80061 LIPID PANEL: CPT

## 2021-10-14 PROCEDURE — 80053 COMPREHEN METABOLIC PANEL: CPT

## 2021-10-14 PROCEDURE — 82607 VITAMIN B-12: CPT

## 2021-10-14 PROCEDURE — 82746 ASSAY OF FOLIC ACID SERUM: CPT

## 2021-10-16 NOTE — RESULT ENCOUNTER NOTE
Released to [facility],  Most labs look stable.  Cholesterol levels have bumped up a bit.  We will discuss in detail at your visit with me on the 20th.  Drink 8 glasses of water a day to keep those kidneys happy!  Maisha Bay M.D.

## 2021-10-20 ENCOUNTER — OFFICE VISIT (OUTPATIENT)
Dept: MEDICAL GROUP | Facility: PHYSICIAN GROUP | Age: 71
End: 2021-10-20
Payer: MEDICARE

## 2021-10-20 VITALS
TEMPERATURE: 97.3 F | DIASTOLIC BLOOD PRESSURE: 70 MMHG | WEIGHT: 192.6 LBS | OXYGEN SATURATION: 92 % | HEIGHT: 64 IN | HEART RATE: 89 BPM | BODY MASS INDEX: 32.88 KG/M2 | RESPIRATION RATE: 20 BRPM | SYSTOLIC BLOOD PRESSURE: 120 MMHG

## 2021-10-20 DIAGNOSIS — M1A.0710 CHRONIC GOUT OF RIGHT FOOT, UNSPECIFIED CAUSE: ICD-10-CM

## 2021-10-20 DIAGNOSIS — Z78.0 MENOPAUSE: ICD-10-CM

## 2021-10-20 DIAGNOSIS — N18.32 STAGE 3B CHRONIC KIDNEY DISEASE: ICD-10-CM

## 2021-10-20 DIAGNOSIS — Z23 NEED FOR VACCINATION: ICD-10-CM

## 2021-10-20 PROCEDURE — 99214 OFFICE O/P EST MOD 30 MIN: CPT | Mod: 25 | Performed by: FAMILY MEDICINE

## 2021-10-20 PROCEDURE — 90662 IIV NO PRSV INCREASED AG IM: CPT | Performed by: FAMILY MEDICINE

## 2021-10-20 PROCEDURE — G0008 ADMIN INFLUENZA VIRUS VAC: HCPCS | Performed by: FAMILY MEDICINE

## 2021-10-20 RX ORDER — ALLOPURINOL 100 MG/1
100 TABLET ORAL DAILY
Qty: 90 TABLET | Refills: 1 | Status: SHIPPED | OUTPATIENT
Start: 2021-10-20 | End: 2022-03-16

## 2021-10-20 RX ORDER — FELODIPINE 5 MG/1
10 TABLET, EXTENDED RELEASE ORAL DAILY
Qty: 200 TABLET | Refills: 3 | Status: SHIPPED | OUTPATIENT
Start: 2021-10-20

## 2021-10-20 ASSESSMENT — FIBROSIS 4 INDEX: FIB4 SCORE: 2.57

## 2021-10-20 NOTE — ASSESSMENT & PLAN NOTE
Patient's last gout attack was about a year ago she would get gout attacks on her right foot base of the great toe.  These have been very painful and debilitating for her.  She does request that she would like to restart the allopurinol 100 mg as she would like to prevent a gout attack.  I reviewed renal function with her GFR is in the 30s.  We discussed the option of starting a low-dose allopurinol at 100 mg recheck the renal function in 2 months and stop allopurinol if there is a decline in renal function continue and monitor if not

## 2021-11-08 DIAGNOSIS — M81.0 OSTEOPOROSIS, UNSPECIFIED OSTEOPOROSIS TYPE, UNSPECIFIED PATHOLOGICAL FRACTURE PRESENCE: ICD-10-CM

## 2021-11-08 NOTE — TELEPHONE ENCOUNTER
Received request via: Pharmacy    Was the patient seen in the last year in this department? Yes    Does the patient have an active prescription (recently filled or refills available) for medication(s) requested? No    Requested Prescriptions     Pending Prescriptions Disp Refills   • raloxifene (EVISTA) 60 MG Tab 100 Tablet 0     Sig: TAKE 1 TABLET BY MOUTH DAILY

## 2021-11-09 RX ORDER — RALOXIFENE HYDROCHLORIDE 60 MG/1
TABLET, FILM COATED ORAL
Qty: 100 TABLET | Refills: 3 | Status: SHIPPED | OUTPATIENT
Start: 2021-11-09 | End: 2023-01-17

## 2021-11-12 ENCOUNTER — TELEPHONE (OUTPATIENT)
Dept: MEDICAL GROUP | Facility: PHYSICIAN GROUP | Age: 71
End: 2021-11-12

## 2021-11-12 NOTE — TELEPHONE ENCOUNTER
Received message from RAMA Chavez with [employer] requesting verbal orders to treat patient 1x per week for cvp monitoring, disease process management related to copd, HYPERTENSION, gout, HYPERLIPIDEMIA and other conditions.     Verbal orders provided.

## 2021-11-30 DIAGNOSIS — Z12.31 ENCOUNTER FOR SCREENING MAMMOGRAM FOR MALIGNANT NEOPLASM OF BREAST: ICD-10-CM

## 2021-12-16 ENCOUNTER — HOSPITAL ENCOUNTER (OUTPATIENT)
Facility: MEDICAL CENTER | Age: 71
End: 2021-12-16
Attending: FAMILY MEDICINE
Payer: MEDICARE

## 2021-12-16 DIAGNOSIS — M1A.0710 CHRONIC GOUT OF RIGHT FOOT, UNSPECIFIED CAUSE: ICD-10-CM

## 2021-12-16 DIAGNOSIS — N18.32 STAGE 3B CHRONIC KIDNEY DISEASE: ICD-10-CM

## 2021-12-16 LAB
ALBUMIN SERPL BCP-MCNC: 3.9 G/DL (ref 3.2–4.9)
BUN SERPL-MCNC: 17 MG/DL (ref 8–22)
CALCIUM SERPL-MCNC: 9.2 MG/DL (ref 8.5–10.5)
CHLORIDE SERPL-SCNC: 99 MMOL/L (ref 96–112)
CO2 SERPL-SCNC: 26 MMOL/L (ref 20–33)
CREAT SERPL-MCNC: 1.67 MG/DL (ref 0.5–1.4)
GLUCOSE SERPL-MCNC: 103 MG/DL (ref 65–99)
PHOSPHATE SERPL-MCNC: 2.9 MG/DL (ref 2.5–4.5)
POTASSIUM SERPL-SCNC: 4.7 MMOL/L (ref 3.6–5.5)
SODIUM SERPL-SCNC: 135 MMOL/L (ref 135–145)

## 2021-12-16 PROCEDURE — 80069 RENAL FUNCTION PANEL: CPT

## 2021-12-21 ENCOUNTER — OFFICE VISIT (OUTPATIENT)
Dept: MEDICAL GROUP | Facility: PHYSICIAN GROUP | Age: 71
End: 2021-12-21
Payer: MEDICARE

## 2021-12-21 VITALS
SYSTOLIC BLOOD PRESSURE: 120 MMHG | OXYGEN SATURATION: 97 % | HEIGHT: 64 IN | WEIGHT: 192 LBS | RESPIRATION RATE: 12 BRPM | BODY MASS INDEX: 32.78 KG/M2 | HEART RATE: 91 BPM | DIASTOLIC BLOOD PRESSURE: 72 MMHG | TEMPERATURE: 97 F

## 2021-12-21 DIAGNOSIS — E21.1 HYPERPARATHYROIDISM DUE TO VITAMIN D DEFICIENCY (HCC): ICD-10-CM

## 2021-12-21 DIAGNOSIS — N18.32 STAGE 3B CHRONIC KIDNEY DISEASE: ICD-10-CM

## 2021-12-21 DIAGNOSIS — E78.5 DYSLIPIDEMIA: ICD-10-CM

## 2021-12-21 PROCEDURE — 99214 OFFICE O/P EST MOD 30 MIN: CPT | Performed by: FAMILY MEDICINE

## 2021-12-21 RX ORDER — ERGOCALCIFEROL 1.25 MG/1
50000 CAPSULE ORAL
Qty: 12 CAPSULE | Refills: 0 | Status: SHIPPED | OUTPATIENT
Start: 2021-12-21 | End: 2022-03-01

## 2021-12-21 ASSESSMENT — FIBROSIS 4 INDEX: FIB4 SCORE: 2.57

## 2021-12-21 NOTE — PROGRESS NOTES
Subjective:   Lauren Bowden is a 71 y.o. female [place] today [place]:     Hyperparathyroidism due to vitamin D deficiency (HCC)  Recent labs show normal ca levels.   She has low vit D this year 2021  Will supplement with weekly booster.   She has CKD stage 3b stable.     Stage 3 chronic kidney disease (HCC)  She has stable renal function, recheck in 3 months.   Vit d is low  Weekly booster dose ordered   Followed by Dr. Najjar  Kidneys are tolerating the low dose allopurinol 500mg           Current medicines (including changes today)  Current Outpatient Medications   Medication Sig Dispense Refill   • vitamin D2, Ergocalciferol, (DRISDOL) 1.25 MG (30858 UT) Cap capsule Take 1 Capsule by mouth every 7 days for 12 doses. Then take over the counter 2000 units of vitamin D daily. 12 Capsule 0   • raloxifene (EVISTA) 60 MG Tab TAKE 1 TABLET BY MOUTH DAILY 100 Tablet 3   • allopurinol (ZYLOPRIM) 100 MG Tab Take 1 Tablet by mouth every day. 90 Tablet 1   • felodipine (PLENDIL) 5 MG TABLET SR 24 HR Take 2 Tablets by mouth every day. 200 Tablet 3   • rosuvastatin (CRESTOR) 20 MG Tab TAKE 1 TABLET BY MOUTH IN  THE EVENING 90 Tablet 3   • omeprazole (PRILOSEC) 20 MG delayed-release capsule TAKE 1 CAPSULE BY MOUTH  DAILY 90 Capsule 1   • buPROPion SR (WELLBUTRIN-SR) 150 MG TABLET SR 12 HR sustained-release tablet TAKE 1 TABLET BY MOUTH  DAILY 90 tablet 1   • cyclobenzaprine (FLEXERIL) 10 mg Tab Take 10 mg by mouth 3 times a day as needed.     • clopidogrel (PLAVIX) 75 MG Tab Take 1 tablet by mouth every day. 90 tablet 3   • losartan (COZAAR) 50 MG Tab Take 1 tablet by mouth every day. 90 tablet 3   • methylPREDNISolone (MEDROL DOSEPAK) 4 MG Tablet Therapy Pack As directed on the packaging label. 21 tablet 0   • furosemide (LASIX) 20 MG Tab TAKE 1 TABLET BY MOUTH  TWICE DAILY 180 tablet 3   • COMBIVENT RESPIMAT  MCG/ACT Aero Soln USE 1 INHALATION BY  "MOUTH 4 TIMES DAILY 12 g 2   • Magnesium 400 MG Cap Take  by mouth.     • Misc. Devices Misc Portable oxygen concentrator: use daily. 1 Each 0   • albuterol (PROVENTIL) 2.5mg/3ml Nebu Soln solution for nebulization 3 mL by Nebulization route every four hours as needed for Shortness of Breath. 75 mL 11     No current facility-administered medications for this visit.     She  has a past medical history of Arthritis, Asthma, Cataract, CKD (chronic kidney disease) stage 4, GFR 15-29 ml/min ([facility]) (7/1/2013), COPD, Dental disorder, Heart burn, History of anemia, Hypercholesteremia, Hypertension, Indigestion, Ovarian neoplasm (8/10/2017), Pneumonia (? 2012), Pulmonary emphysema ([facility]), Stroke ([facility]) (2016), and Vitamin d deficiency (7/1/2013).    ROS  No chest pain, no shortness of breath, no abdominal pain       Objective:     /72   Pulse 91   Temp 36.1 °C (97 °F) (Temporal)   Resp 12   Ht 1.626 m (5' 4\")   Wt 87.1 kg (192 lb)   SpO2 97%  Body mass index is 32.96 kg/m².   Physical Exam:  Constitutional: Alert, no distress.  Skin: Warm, dry, good turgor, no rashes in visible areas.  Eye: Equal, round and reactive, conjunctiva clear, lids normal.  ENMT: Lips without lesions, good dentition, oropharynx clear.  Neck: Trachea midline, no masses, no thyromegaly. No cervical or supraclavicular lymphadenopathy  Respiratory: Unlabored respiratory effort, lungs clear to auscultation, no wheezes, no ronchi.  Cardiovascular: Normal S1, S2, no murmur, no edema.  Abdomen: Soft, non-tender, no masses, no hepatosplenomegaly.  Psych: Alert and oriented x3, normal affect and mood.        Assessment and Plan:   The following treatment plan was discussed    1. Hyperparathyroidism due to vitamin D deficiency (HCC)    - VITAMIN D,25 HYDROXY; Future  - PTH INTACT (PTH ONLY); Future    2. Stage 3b chronic kidney disease (HCC)    - CBC WITH DIFFERENTIAL; Future  - Comp Metabolic Panel; Future  - VITAMIN D,25 HYDROXY; Future    3. " Dyslipidemia    - Lipid Profile; Future      Followup: Return in about 3 months (around 3/21/2022) for pls help her with mychart login..

## 2021-12-21 NOTE — ASSESSMENT & PLAN NOTE
She has stable renal function, recheck in 3 months.   Vit d is low  Weekly booster dose ordered   Followed by Dr. Najjar  Kidneys are tolerating the low dose allopurinol 500mg

## 2021-12-21 NOTE — ASSESSMENT & PLAN NOTE
Recent labs show normal ca levels.   She has low vit D this year 2021  Will supplement with weekly booster.   She has CKD stage 3b stable.

## 2022-01-05 ENCOUNTER — TELEPHONE (OUTPATIENT)
Dept: MEDICAL GROUP | Facility: PHYSICIAN GROUP | Age: 72
End: 2022-01-05

## 2022-01-06 NOTE — TELEPHONE ENCOUNTER
Received message from RAMA Chavez with [employer] requesting verbal orders to continue to see patient 1x/wk for 9 wks.  She states the patient had an infection and has started steroid pack prescribed.    Verbal orders provided.

## 2022-01-10 ENCOUNTER — TELEPHONE (OUTPATIENT)
Dept: MEDICAL GROUP | Facility: PHYSICIAN GROUP | Age: 72
End: 2022-01-10

## 2022-01-11 NOTE — TELEPHONE ENCOUNTER
Received message from RAMA Chavez with [employer] requesting to see patient 2x/wk due to her breathing.    Verbal orders provided.

## 2022-01-17 ENCOUNTER — TELEPHONE (OUTPATIENT)
Dept: MEDICAL GROUP | Facility: PHYSICIAN GROUP | Age: 72
End: 2022-01-17

## 2022-01-17 NOTE — TELEPHONE ENCOUNTER
"Received the following message from RAMA Chavez with [employer]:    \"I would like to see if Dr Bay would consider an order for a motorized scooter for Shereen pavon. Would you please let me know what she thinks and I will get that started. It will also require a PT eval.\"    Please advise.  "

## 2022-01-26 DIAGNOSIS — Z72.0 TOBACCO USE: ICD-10-CM

## 2022-01-26 DIAGNOSIS — I25.10 CORONARY ARTERY DISEASE INVOLVING NATIVE CORONARY ARTERY OF NATIVE HEART WITHOUT ANGINA PECTORIS: ICD-10-CM

## 2022-01-26 RX ORDER — IPRATROPIUM BROMIDE AND ALBUTEROL 20; 100 UG/1; UG/1
SPRAY, METERED RESPIRATORY (INHALATION)
Qty: 12 G | Refills: 2 | Status: SHIPPED | OUTPATIENT
Start: 2022-01-26 | End: 2022-09-23

## 2022-01-26 RX ORDER — BUPROPION HYDROCHLORIDE 150 MG/1
TABLET, EXTENDED RELEASE ORAL
Qty: 90 TABLET | Refills: 3 | Status: SHIPPED | OUTPATIENT
Start: 2022-01-26 | End: 2022-12-13

## 2022-03-01 RX ORDER — ERGOCALCIFEROL 1.25 MG/1
50000 CAPSULE ORAL
Qty: 12 CAPSULE | Refills: 0 | Status: SHIPPED | OUTPATIENT
Start: 2022-03-01 | End: 2022-05-18

## 2022-03-03 DIAGNOSIS — K21.9 GERD WITHOUT ESOPHAGITIS: ICD-10-CM

## 2022-03-03 RX ORDER — OMEPRAZOLE 20 MG/1
CAPSULE, DELAYED RELEASE ORAL
Qty: 90 CAPSULE | Refills: 3 | Status: SHIPPED | OUTPATIENT
Start: 2022-03-03 | End: 2022-10-05

## 2022-03-03 NOTE — TELEPHONE ENCOUNTER
Received request via: Pharmacy    Was the patient seen in the last year in this department? Yes    lov 12/21/2021    Does the patient have an active prescription (recently filled or refills available) for medication(s) requested? No

## 2022-03-07 ENCOUNTER — TELEPHONE (OUTPATIENT)
Dept: MEDICAL GROUP | Facility: PHYSICIAN GROUP | Age: 72
End: 2022-03-07
Payer: MEDICARE

## 2022-03-07 NOTE — TELEPHONE ENCOUNTER
Received message from RAMA Ordonez with [facility] requesting verbal orders to continue seeing her once a week for nursing visits to assist manage her COPD. The patient was also agreeable to begin physical therapy.    Verbal orders provided.

## 2022-03-10 ENCOUNTER — TELEPHONE (OUTPATIENT)
Dept: MEDICAL GROUP | Facility: PHYSICIAN GROUP | Age: 72
End: 2022-03-10
Payer: MEDICARE

## 2022-03-10 NOTE — TELEPHONE ENCOUNTER
Gage pierson/ [facility]      Would like verbal orders for PT     2 x weekly for 4 weeks     [phone] Gage       Gave verbal ok.

## 2022-03-13 DIAGNOSIS — M1A.0710 CHRONIC GOUT OF RIGHT FOOT, UNSPECIFIED CAUSE: ICD-10-CM

## 2022-03-14 ENCOUNTER — HOSPITAL ENCOUNTER (OUTPATIENT)
Facility: MEDICAL CENTER | Age: 72
End: 2022-03-14
Attending: FAMILY MEDICINE
Payer: MEDICARE

## 2022-03-14 DIAGNOSIS — N18.32 STAGE 3B CHRONIC KIDNEY DISEASE: ICD-10-CM

## 2022-03-14 DIAGNOSIS — E78.5 DYSLIPIDEMIA: ICD-10-CM

## 2022-03-14 LAB
BASOPHILS # BLD AUTO: 0.8 % (ref 0–1.8)
BASOPHILS # BLD: 0.06 K/UL (ref 0–0.12)
EOSINOPHIL # BLD AUTO: 0.11 K/UL (ref 0–0.51)
EOSINOPHIL NFR BLD: 1.4 % (ref 0–6.9)
ERYTHROCYTE [DISTWIDTH] IN BLOOD BY AUTOMATED COUNT: 45.3 FL (ref 35.9–50)
HCT VFR BLD AUTO: 44.9 % (ref 37–47)
HGB BLD-MCNC: 14.6 G/DL (ref 12–16)
IMM GRANULOCYTES # BLD AUTO: 0.06 K/UL (ref 0–0.11)
IMM GRANULOCYTES NFR BLD AUTO: 0.8 % (ref 0–0.9)
LYMPHOCYTES # BLD AUTO: 2.39 K/UL (ref 1–4.8)
LYMPHOCYTES NFR BLD: 31.1 % (ref 22–41)
MCH RBC QN AUTO: 30.3 PG (ref 27–33)
MCHC RBC AUTO-ENTMCNC: 32.5 G/DL (ref 33.6–35)
MCV RBC AUTO: 93.2 FL (ref 81.4–97.8)
MONOCYTES # BLD AUTO: 0.52 K/UL (ref 0–0.85)
MONOCYTES NFR BLD AUTO: 6.8 % (ref 0–13.4)
NEUTROPHILS # BLD AUTO: 4.54 K/UL (ref 2–7.15)
NEUTROPHILS NFR BLD: 59.1 % (ref 44–72)
NRBC # BLD AUTO: 0 K/UL
NRBC BLD-RTO: 0 /100 WBC
PLATELET # BLD AUTO: 242 K/UL (ref 164–446)
PMV BLD AUTO: 10.2 FL (ref 9–12.9)
RBC # BLD AUTO: 4.82 M/UL (ref 4.2–5.4)
WBC # BLD AUTO: 7.7 K/UL (ref 4.8–10.8)

## 2022-03-14 PROCEDURE — 85025 COMPLETE CBC W/AUTO DIFF WBC: CPT

## 2022-03-14 PROCEDURE — 80053 COMPREHEN METABOLIC PANEL: CPT

## 2022-03-14 PROCEDURE — 80061 LIPID PANEL: CPT

## 2022-03-14 NOTE — TELEPHONE ENCOUNTER
Received request via: Pharmacy    Was the patient seen in the last year in this department? Yes    Does the patient have an active prescription (recently filled or refills available) for medication(s) requested? No  Requested Prescriptions     Pending Prescriptions Disp Refills    allopurinol (ZYLOPRIM) 100 MG Tab [Pharmacy Med Name: Allopurinol 100 MG Oral Tablet] 90 Tablet 1     Sig: TAKE 1 TABLET BY MOUTH  DAILY         Last ov 12/21/21

## 2022-03-15 LAB
ALBUMIN SERPL BCP-MCNC: 4.1 G/DL (ref 3.2–4.9)
ALBUMIN/GLOB SERPL: 1.3 G/DL
ALP SERPL-CCNC: 98 U/L (ref 30–99)
ALT SERPL-CCNC: 20 U/L (ref 2–50)
ANION GAP SERPL CALC-SCNC: 14 MMOL/L (ref 7–16)
AST SERPL-CCNC: 23 U/L (ref 12–45)
BILIRUB SERPL-MCNC: 0.4 MG/DL (ref 0.1–1.5)
BUN SERPL-MCNC: 14 MG/DL (ref 8–22)
CALCIUM SERPL-MCNC: 9.7 MG/DL (ref 8.5–10.5)
CHLORIDE SERPL-SCNC: 101 MMOL/L (ref 96–112)
CHOLEST SERPL-MCNC: 190 MG/DL (ref 100–199)
CO2 SERPL-SCNC: 24 MMOL/L (ref 20–33)
CREAT SERPL-MCNC: 1.49 MG/DL (ref 0.5–1.4)
FASTING STATUS PATIENT QL REPORTED: NORMAL
GFR SERPLBLD CREATININE-BSD FMLA CKD-EPI: 37 ML/MIN/1.73 M 2
GLOBULIN SER CALC-MCNC: 3.1 G/DL (ref 1.9–3.5)
GLUCOSE SERPL-MCNC: 105 MG/DL (ref 65–99)
HDLC SERPL-MCNC: 60 MG/DL
LDLC SERPL CALC-MCNC: 78 MG/DL
POTASSIUM SERPL-SCNC: 3.9 MMOL/L (ref 3.6–5.5)
PROT SERPL-MCNC: 7.2 G/DL (ref 6–8.2)
SODIUM SERPL-SCNC: 139 MMOL/L (ref 135–145)
TRIGL SERPL-MCNC: 258 MG/DL (ref 0–149)

## 2022-03-16 ENCOUNTER — HOSPITAL ENCOUNTER (OUTPATIENT)
Facility: MEDICAL CENTER | Age: 72
End: 2022-03-16
Attending: FAMILY MEDICINE
Payer: MEDICARE

## 2022-03-16 DIAGNOSIS — N18.32 STAGE 3B CHRONIC KIDNEY DISEASE: ICD-10-CM

## 2022-03-16 DIAGNOSIS — E21.1 HYPERPARATHYROIDISM DUE TO VITAMIN D DEFICIENCY (HCC): ICD-10-CM

## 2022-03-16 LAB
25(OH)D3 SERPL-MCNC: 34 NG/ML (ref 30–100)
PTH-INTACT SERPL-MCNC: 76.4 PG/ML (ref 14–72)

## 2022-03-16 PROCEDURE — 82306 VITAMIN D 25 HYDROXY: CPT

## 2022-03-16 PROCEDURE — 83970 ASSAY OF PARATHORMONE: CPT

## 2022-03-16 RX ORDER — ALLOPURINOL 100 MG/1
TABLET ORAL
Qty: 90 TABLET | Refills: 3 | Status: SHIPPED | OUTPATIENT
Start: 2022-03-16 | End: 2023-02-24

## 2022-03-22 ENCOUNTER — OFFICE VISIT (OUTPATIENT)
Dept: MEDICAL GROUP | Facility: PHYSICIAN GROUP | Age: 72
End: 2022-03-22
Payer: MEDICARE

## 2022-03-22 VITALS
HEIGHT: 64 IN | TEMPERATURE: 97.3 F | RESPIRATION RATE: 28 BRPM | WEIGHT: 189.4 LBS | DIASTOLIC BLOOD PRESSURE: 78 MMHG | HEART RATE: 100 BPM | SYSTOLIC BLOOD PRESSURE: 132 MMHG | OXYGEN SATURATION: 89 % | BODY MASS INDEX: 32.33 KG/M2

## 2022-03-22 DIAGNOSIS — J96.11 CHRONIC RESPIRATORY FAILURE WITH HYPOXIA (HCC): ICD-10-CM

## 2022-03-22 DIAGNOSIS — E21.1 HYPERPARATHYROIDISM DUE TO VITAMIN D DEFICIENCY (HCC): ICD-10-CM

## 2022-03-22 DIAGNOSIS — N18.32 STAGE 3B CHRONIC KIDNEY DISEASE: ICD-10-CM

## 2022-03-22 DIAGNOSIS — J43.8 OTHER EMPHYSEMA (HCC): ICD-10-CM

## 2022-03-22 PROCEDURE — 99214 OFFICE O/P EST MOD 30 MIN: CPT | Performed by: FAMILY MEDICINE

## 2022-03-22 RX ORDER — CARBOXYMETHYLCELLULOSE SODIUM 10 MG/ML
1 GEL OPHTHALMIC PRN
COMMUNITY

## 2022-03-22 ASSESSMENT — PATIENT HEALTH QUESTIONNAIRE - PHQ9: CLINICAL INTERPRETATION OF PHQ2 SCORE: 0

## 2022-03-22 ASSESSMENT — FIBROSIS 4 INDEX: FIB4 SCORE: 1.55

## 2022-03-22 NOTE — PROGRESS NOTES
Subjective:   Lauren Bowden is a 72 y.o. female here today for evaluation and management of:     Stage 3 chronic kidney disease (HCC)  Chronic condition, stable with GFR in upper 30s  Stays well hydrated, encouraged 8 glasses of water a day, low salt diet, avoid NSAIDS.    Hyperparathyroidism due to vitamin D deficiency (HCC)  PtH levels improved with vit d supplementation, normal ca levels. Stays well hydrated, she has never had a kidney stone.   Has ckd stage 3b.   Repeat labs ordered.   She is currently on weekly 50K vit d supplement. Advised after she completes the 12 week course to take daily 5000 units vit D   Recheck labs.       Chronic respiratory failure with hypoxia (HCC)  24/7 oxygen supplementation 2.5 L    Other emphysema ([facility])  Chronic condition, has baseline shortness of breath with exertion like walking on 2.5 L oxygen continuous supplementation.          Current medicines (including changes today)  Current Outpatient Medications   Medication Sig Dispense Refill   • carboxymethylcellulose 1 % Gel 1 Drop as needed.     • multivitamin (THERAGRAN) Tab Take 1 Tablet by mouth every day. MVI     • allopurinol (ZYLOPRIM) 100 MG Tab TAKE 1 TABLET BY MOUTH  DAILY 90 Tablet 3   • omeprazole (PRILOSEC) 20 MG delayed-release capsule TAKE 1 CAPSULE BY MOUTH  DAILY 90 Capsule 3   • vitamin D2, Ergocalciferol, (DRISDOL) 1.25 MG (07659 UT) Cap capsule TAKE 1 CAPSULE BY MOUTH  EVERY 7 DAYS FOR 12 DOSES.  THEN TAKE OVER THE COUNTER  2000 UNITS OF VITAMIN D  DAILY 12 Capsule 0   • buPROPion SR (WELLBUTRIN-SR) 150 MG TABLET SR 12 HR sustained-release tablet TAKE 1 TABLET BY MOUTH  DAILY 90 Tablet 3   • COMBIVENT RESPIMAT  MCG/ACT Aero Soln USE 1 INHALATION BY MOUTH 4 TIMES DAILY 12 g 2   • raloxifene (EVISTA) 60 MG Tab TAKE 1 TABLET BY MOUTH DAILY 100 Tablet 3   • felodipine (PLENDIL) 5 MG TABLET SR 24 HR Take 2 Tablets by mouth every day. 200 Tablet 3   • rosuvastatin (CRESTOR) 20 MG Tab TAKE 1 TABLET BY  "MOUTH IN  THE EVENING 90 Tablet 3   • cyclobenzaprine (FLEXERIL) 10 mg Tab Take 10 mg by mouth 3 times a day as needed.     • clopidogrel (PLAVIX) 75 MG Tab Take 1 tablet by mouth every day. 90 tablet 3   • losartan (COZAAR) 50 MG Tab Take 1 tablet by mouth every day. 90 tablet 3   • furosemide (LASIX) 20 MG Tab TAKE 1 TABLET BY MOUTH  TWICE DAILY 180 tablet 3   • Magnesium 400 MG Cap Take  by mouth.     • Misc. Devices Misc Portable oxygen concentrator: use daily. 1 Each 0   • albuterol (PROVENTIL) 2.5mg/3ml Nebu Soln solution for nebulization 3 mL by Nebulization route every four hours as needed for Shortness of Breath. 75 mL 11     No current facility-administered medications for this visit.     She  has a past medical history of Arthritis, Asthma, Cataract, CKD (chronic kidney disease) stage 4, GFR 15-29 ml/min ([facility]) (7/1/2013), COPD, Dental disorder, Heart burn, History of anemia, Hypercholesteremia, Hypertension, Indigestion, Ovarian neoplasm (8/10/2017), Pneumonia (? 2012), Pulmonary emphysema ([facility]), Stroke ([facility]) (2016), and Vitamin d deficiency (7/1/2013).    ROS  No chest pain, no shortness of breath, no abdominal pain       Objective:     /78 (BP Location: Right arm, Patient Position: Sitting, BP Cuff Size: Adult)   Pulse 100   Temp 36.3 °C (97.3 °F) (Temporal)   Resp (!) 28   Ht 1.626 m (5' 4\")   Wt 85.9 kg (189 lb 6.4 oz)   SpO2 89%  Body mass index is 32.51 kg/m².   Physical Exam:  Constitutional: Alert, no distress.  Skin: Warm, dry, good turgor, no rashes in visible areas.  Eye: Equal, round and reactive, conjunctiva clear, lids normal.  ENMT: Lips without lesions, good dentition, oropharynx clear.  Neck: Trachea midline, no masses, no thyromegaly. No cervical or supraclavicular lymphadenopathy  Respiratory: Unlabored respiratory effort, lungs clear to auscultation, no wheezes, no ronchi.  Cardiovascular: Normal S1, S2, no murmur, no edema.  Abdomen: Soft, non-tender, no masses, no " hepatosplenomegaly.  Psych: Alert and oriented x3, normal affect and mood.        Assessment and Plan:   The following treatment plan was discussed    1. Chronic respiratory failure with hypoxia (HCC)      2. Hyperparathyroidism due to vitamin D deficiency (HCC)    - PTH INTACT (PTH ONLY); Future  - VITAMIN D,25 HYDROXY; Future    3. Stage 3b chronic kidney disease (HCC)    - Renal Function Panel; Future    4. Other emphysema (HCC)        Followup: Return in about 4 months (around 7/22/2022) for ckd, hyperparathyroidism, copd.

## 2022-03-22 NOTE — ASSESSMENT & PLAN NOTE
Chronic condition, has baseline shortness of breath with exertion like walking on 2.5 L oxygen continuous supplementation.

## 2022-03-22 NOTE — ASSESSMENT & PLAN NOTE
Chronic condition, stable with GFR in upper 30s  Stays well hydrated, encouraged 8 glasses of water a day, low salt diet, avoid NSAIDS.

## 2022-03-22 NOTE — RESULT ENCOUNTER NOTE
Patient has an appt with me later today   I reviewed lab results. Kidney function is stable flow is in upper 30s. I encourage to continue hydration with 8 glasses of water a day, avoid NSAIDS (ibuprofen, advil, aleve, motrin) tylenol is ok for pain if needed.   Triglyceride levels are high, avoid sugary, sweet, fatty, fried, processed foods.   Maisha Bay M.D.

## 2022-03-22 NOTE — RESULT ENCOUNTER NOTE
Please advise patient that I reviewed lab results. Vit D and PTH levels improved!  Maisha Bay M.D.

## 2022-03-22 NOTE — ASSESSMENT & PLAN NOTE
PtH levels improved with vit d supplementation, normal ca levels. Stays well hydrated, she has never had a kidney stone.   Has ckd stage 3b.   Repeat labs ordered.   She is currently on weekly 50K vit d supplement. Advised after she completes the 12 week course to take daily 5000 units vit D   Recheck labs.

## 2022-03-28 ENCOUNTER — TELEPHONE (OUTPATIENT)
Dept: MEDICAL GROUP | Facility: PHYSICIAN GROUP | Age: 72
End: 2022-03-28
Payer: MEDICARE

## 2022-03-28 NOTE — TELEPHONE ENCOUNTER
[facility] wants clarification for patient's Vitamin d2- She already finished her 12 doses from December and they wanted to know if she should be taking the OTC vitamin d now or should refill her previous rx for Vitamin d.     Please advise.

## 2022-04-06 ENCOUNTER — TELEPHONE (OUTPATIENT)
Dept: MEDICAL GROUP | Facility: PHYSICIAN GROUP | Age: 72
End: 2022-04-06

## 2022-04-06 ENCOUNTER — OFFICE VISIT (OUTPATIENT)
Dept: MEDICAL GROUP | Facility: PHYSICIAN GROUP | Age: 72
End: 2022-04-06
Payer: MEDICARE

## 2022-04-06 VITALS
SYSTOLIC BLOOD PRESSURE: 140 MMHG | TEMPERATURE: 97.3 F | BODY MASS INDEX: 32.78 KG/M2 | HEART RATE: 90 BPM | DIASTOLIC BLOOD PRESSURE: 72 MMHG | OXYGEN SATURATION: 93 % | WEIGHT: 192 LBS | HEIGHT: 64 IN

## 2022-04-06 DIAGNOSIS — Z74.09 IMPAIRED MOBILITY: ICD-10-CM

## 2022-04-06 DIAGNOSIS — Z12.39 ENCOUNTER FOR BREAST CANCER SCREENING USING NON-MAMMOGRAM MODALITY: ICD-10-CM

## 2022-04-06 DIAGNOSIS — N64.4 BREAST PAIN: ICD-10-CM

## 2022-04-06 DIAGNOSIS — J96.11 CHRONIC RESPIRATORY FAILURE WITH HYPOXIA (HCC): ICD-10-CM

## 2022-04-06 DIAGNOSIS — R53.81 DEBILITY: ICD-10-CM

## 2022-04-06 PROCEDURE — 99213 OFFICE O/P EST LOW 20 MIN: CPT | Performed by: FAMILY MEDICINE

## 2022-04-06 ASSESSMENT — FIBROSIS 4 INDEX: FIB4 SCORE: 1.55

## 2022-04-06 NOTE — ASSESSMENT & PLAN NOTE
Breast US ordered, patient has tried to get mammogram done she is unable to stand long enough due to pain, muscle cramps and shortness of breath due to COPD and arthritis.   Her last mammo was 2017  She has breast pain with wearing her bra.

## 2022-04-06 NOTE — ASSESSMENT & PLAN NOTE
Lauren is 72 years old, she has COPD, has to use oxygen continuously and has to have portable oxygen with her at all times.  Patient has been evaluated by Home Health PT  Will increase her quality of life and decrease her risk factors and falls as she also uses portable oxygen.   Currently she uses a walker but is unable to walk very far with this due to shortness of breath.   She has chronic pain and limitations in mobility from osteoarthritis. She has fractured her pelvis in the past and continues to have chronic pain with mobility.   Her ADLS in the house are affected adversely by the copd and pain. A Scooter would help with these problems and allow her to perform her ADLs in the home.     A scooter with a basket will allow her to move about her home and outside the home while placing the oxygen in the basket.   rx for Power Mobility Device done today   Referral to PT done for evaluation for PMD, will request records.

## 2022-04-06 NOTE — LETTER
[facility]  Maisha Bay M.D.  [address] [place]  [place] [address]  Fax: [phone]   Authorization for Release/Disclosure of   Protected Health Information   Name: LAUREN CAMPOS : 1950 SSN: [id number]   Address: [address] [address]  [place] [address] Phone:    [phone] (home)    I authorize the entity listed below to release/disclose the PHI below to:   [facility]/Maisha Bay M.D. and Maisha Bay M.D.   Provider or Entity Name:     Address   City, State, Zip   Phone:      Fax:     Reason for request: continuity of care   Information to be released:    [  ] LAST COLONOSCOPY,  including any PATH REPORT and follow-up  [  ] LAST FIT/COLOGUARD RESULT [  ] LAST DEXA  [  ] LAST MAMMOGRAM  [  ] LAST PAP  [  ] LAST LABS [  ] RETINA EXAM REPORT  [  ] IMMUNIZATION RECORDS  [  ] Release all info      [  ] Check here and initial the line next to each item to release ALL health information INCLUDING  _____ Care and treatment for drug and / or alcohol abuse  _____ HIV testing, infection status, or AIDS  _____ Genetic Testing    DATES OF SERVICE OR TIME PERIOD TO BE DISCLOSED: _____________  I understand and acknowledge that:  * This Authorization may be revoked at any time by you in writing, except if your health information has already been used or disclosed.  * Your health information that will be used or disclosed as a result of you signing this authorization could be re-disclosed by the recipient. If this occurs, your re-disclosed health information may no longer be protected by State or Federal laws.  * You may refuse to sign this Authorization. Your refusal will not affect your ability to obtain treatment.  * This Authorization becomes effective upon signing and will  on (date) __________.      If no date is indicated, this Authorization will  one (1) year from the signature date.    Name: Lauren Campos    Signature:   Date:     2022       PLEASE FAX REQUESTED  RECORDS BACK TO: [phone]

## 2022-04-06 NOTE — PROGRESS NOTES
Subjective:   Lauren Bowden is a 72 y.o. female here today for evaluation and management of:     Impaired mobility  Lauren is 72 years old, she has COPD, has to use oxygen continuously and has to have portable oxygen with her at all times.  Patient has been evaluated by Home Health PT  Will increase her quality of life and decrease her risk factors and falls as she also uses portable oxygen.   Currently she uses a walker but is unable to walk very far with this due to shortness of breath.   She has chronic pain and limitations in mobility from osteoarthritis. She has fractured her pelvis in the past and continues to have chronic pain with mobility.   Her ADLS in the house are affected adversely by the copd and pain. A Scooter would help with these problems and allow her to perform her ADLs in the home.     A scooter with a basket will allow her to move about her home and outside the home while placing the oxygen in the basket.   rx for Power Mobility Device done today   Referral to PT done for evaluation for PMD, will request records.            Current medicines (including changes today)  Current Outpatient Medications   Medication Sig Dispense Refill   • Misc. Devices Misc Power scooter power mobility device. Please fax to [employer]. Ph: Local Phone: [phone] 1 Each 0   • carboxymethylcellulose 1 % Gel 1 Drop as needed.     • multivitamin (THERAGRAN) Tab Take 1 Tablet by mouth every day. MVI     • allopurinol (ZYLOPRIM) 100 MG Tab TAKE 1 TABLET BY MOUTH  DAILY 90 Tablet 3   • omeprazole (PRILOSEC) 20 MG delayed-release capsule TAKE 1 CAPSULE BY MOUTH  DAILY 90 Capsule 3   • vitamin D2, Ergocalciferol, (DRISDOL) 1.25 MG (53502 UT) Cap capsule TAKE 1 CAPSULE BY MOUTH  EVERY 7 DAYS FOR 12 DOSES.  THEN TAKE OVER THE COUNTER  2000 UNITS OF VITAMIN D  DAILY 12 Capsule 0   • buPROPion SR (WELLBUTRIN-SR) 150 MG TABLET SR 12 HR sustained-release tablet TAKE 1 TABLET BY MOUTH  DAILY 90 Tablet 3   • COMBIVENT  "RESPIMAT  MCG/ACT Aero Soln USE 1 INHALATION BY MOUTH 4 TIMES DAILY 12 g 2   • raloxifene (EVISTA) 60 MG Tab TAKE 1 TABLET BY MOUTH DAILY 100 Tablet 3   • felodipine (PLENDIL) 5 MG TABLET SR 24 HR Take 2 Tablets by mouth every day. 200 Tablet 3   • rosuvastatin (CRESTOR) 20 MG Tab TAKE 1 TABLET BY MOUTH IN  THE EVENING 90 Tablet 3   • cyclobenzaprine (FLEXERIL) 10 mg Tab Take 10 mg by mouth 3 times a day as needed.     • clopidogrel (PLAVIX) 75 MG Tab Take 1 tablet by mouth every day. 90 tablet 3   • losartan (COZAAR) 50 MG Tab Take 1 tablet by mouth every day. 90 tablet 3   • furosemide (LASIX) 20 MG Tab TAKE 1 TABLET BY MOUTH  TWICE DAILY 180 tablet 3   • Magnesium 400 MG Cap Take  by mouth.     • Misc. Devices Misc Portable oxygen concentrator: use daily. 1 Each 0   • albuterol (PROVENTIL) 2.5mg/3ml Nebu Soln solution for nebulization 3 mL by Nebulization route every four hours as needed for Shortness of Breath. 75 mL 11     No current facility-administered medications for this visit.     She  has a past medical history of Arthritis, Asthma, Cataract, CKD (chronic kidney disease) stage 4, GFR 15-29 ml/min ([facility]) (7/1/2013), COPD, Dental disorder, Heart burn, History of anemia, Hypercholesteremia, Hypertension, Indigestion, Ovarian neoplasm (8/10/2017), Pneumonia (? 2012), Pulmonary emphysema ([facility]), Stroke ([facility]) (2016), and Vitamin d deficiency (7/1/2013).    ROS  No chest pain, no shortness of breath, no abdominal pain       Objective:     /72   Pulse 90   Temp 36.3 °C (97.3 °F)   Ht 1.626 m (5' 4\")   Wt 87.1 kg (192 lb)   SpO2 93%  Body mass index is 32.96 kg/m².   Physical Exam:  Constitutional: Alert, no distress, well-groomed.  Skin: No rashes in visible areas.  Eye: Round. Conjunctiva clear, lids normal. No icterus.   ENMT: Lips pink without lesions, good dentition, moist mucous membranes. Phonation normal.  Neck: No masses, no thyromegaly. Moves freely without pain.  Respiratory: Unlabored " respiratory effort, no cough or audible wheeze  Psych: Alert and oriented x3, normal affect and mood.          Assessment and Plan:   The following treatment plan was discussed    1. Impaired mobility    - Misc. Devices Misc; Power scooter power mobility device. Please fax to [employer]. Ph: Local Phone: [phone]  Dispense: 1 Each; Refill: 0    2. Chronic respiratory failure with hypoxia (HCC)    - Misc. Devices Misc; Power scooter power mobility device. Please fax to [employer]. Ph: Local Phone: [phone]  Dispense: 1 Each; Refill: 0    3. Debility    - Misc. Devices Misc; Power scooter power mobility device. Please fax to [employer]. Ph: Local Phone: [phone]  Dispense: 1 Each; Refill: 0      Followup: Return for as scheduled in July.

## 2022-04-06 NOTE — TELEPHONE ENCOUNTER
Please request records from [facility] home health PT regarding evaluation for power mobility device (scooter).     Thank you  Maisha Bay M.D.

## 2022-04-08 ENCOUNTER — TELEPHONE (OUTPATIENT)
Dept: MEDICAL GROUP | Facility: PHYSICIAN GROUP | Age: 72
End: 2022-04-08
Payer: MEDICARE

## 2022-04-08 NOTE — TELEPHONE ENCOUNTER
Gage with [facility] is looking for verbal orders for pt for strength and PT     He would like to suggest that paul gray.   He will send supporting doc to help the process     She uses a walker and has o2 tanks to lug around     He would like pt for 2 times a week for the next 4 weeks.     I gave verbal okay to Edu

## 2022-04-13 DIAGNOSIS — I10 ESSENTIAL HYPERTENSION: ICD-10-CM

## 2022-04-13 DIAGNOSIS — I15.9 SECONDARY HYPERTENSION: ICD-10-CM

## 2022-04-13 RX ORDER — CLOPIDOGREL BISULFATE 75 MG/1
TABLET ORAL
Qty: 90 TABLET | Refills: 3 | Status: SHIPPED | OUTPATIENT
Start: 2022-04-13 | End: 2023-02-24

## 2022-04-13 RX ORDER — FUROSEMIDE 20 MG/1
TABLET ORAL
Qty: 180 TABLET | Refills: 3 | Status: SHIPPED | OUTPATIENT
Start: 2022-04-13 | End: 2023-02-24

## 2022-04-13 NOTE — TELEPHONE ENCOUNTER
Last ov 4/6/22    Received request via: Pharmacy    Was the patient seen in the last year in this department? Yes    Does the patient have an active prescription (recently filled or refills available) for medication(s) requested? No

## 2022-05-26 RX ORDER — LOSARTAN POTASSIUM 50 MG/1
TABLET ORAL
Qty: 90 TABLET | Refills: 3 | Status: SHIPPED | OUTPATIENT
Start: 2022-05-26 | End: 2023-04-05

## 2022-05-27 ENCOUNTER — OFFICE VISIT (OUTPATIENT)
Dept: MEDICAL GROUP | Facility: PHYSICIAN GROUP | Age: 72
End: 2022-05-27
Payer: MEDICARE

## 2022-05-27 ENCOUNTER — APPOINTMENT (OUTPATIENT)
Dept: RADIOLOGY | Facility: IMAGING CENTER | Age: 72
End: 2022-05-27
Attending: FAMILY MEDICINE
Payer: MEDICARE

## 2022-05-27 VITALS
HEIGHT: 64 IN | RESPIRATION RATE: 22 BRPM | OXYGEN SATURATION: 94 % | HEART RATE: 91 BPM | DIASTOLIC BLOOD PRESSURE: 68 MMHG | BODY MASS INDEX: 32.96 KG/M2 | SYSTOLIC BLOOD PRESSURE: 140 MMHG | TEMPERATURE: 96.8 F

## 2022-05-27 DIAGNOSIS — Z12.31 ENCOUNTER FOR SCREENING MAMMOGRAM FOR BREAST CANCER: ICD-10-CM

## 2022-05-27 DIAGNOSIS — J96.11 CHRONIC RESPIRATORY FAILURE WITH HYPOXIA (HCC): ICD-10-CM

## 2022-05-27 DIAGNOSIS — R06.02 SHORTNESS OF BREATH: ICD-10-CM

## 2022-05-27 PROCEDURE — 99214 OFFICE O/P EST MOD 30 MIN: CPT | Performed by: FAMILY MEDICINE

## 2022-05-27 PROCEDURE — 71046 X-RAY EXAM CHEST 2 VIEWS: CPT | Mod: TC,FY | Performed by: FAMILY MEDICINE

## 2022-05-27 RX ORDER — AZITHROMYCIN 250 MG/1
TABLET, FILM COATED ORAL
Qty: 6 TABLET | Refills: 1 | Status: SHIPPED
Start: 2022-05-27 | End: 2022-08-11

## 2022-05-27 NOTE — PROGRESS NOTES
Subjective:   Lauren Bowden is a 72 y.o. female here today for evaluation and management of:     Chronic respiratory failure with hypoxia (HCC)  She has been on oxygen supplementation.   She has increased shortness of breath, she also has mid back pain x 3 weeks, fatigue, will treat as an outpatient pneumonia.   zpack has helped in the past.   rx provided with one refill.   She was feeling well before this episode, was working well with PT with Carlos Almonte with [facility].        She finds it hard to drive anywhere other than in [place] after the passing of her  Toy, I told her to look into [facility] services that can help with taking her to appointments.     Current medicines (including changes today)  Current Outpatient Medications   Medication Sig Dispense Refill   • azithromycin (ZITHROMAX) 250 MG Tab Take two tablets by mouth day one and one tablet by mouth once a day day 2-5. 6 Tablet 1   • losartan (COZAAR) 50 MG Tab TAKE 1 TABLET BY MOUTH  DAILY 90 Tablet 3   • clopidogrel (PLAVIX) 75 MG Tab TAKE 1 TABLET BY MOUTH  DAILY 90 Tablet 3   • furosemide (LASIX) 20 MG Tab TAKE 1 TABLET BY MOUTH  TWICE DAILY 180 Tablet 3   • Misc. Devices Misc Power scooter power mobility device. Please fax to [facility]. Ph: Local Phone: [phone] 1 Each 0   • carboxymethylcellulose 1 % Gel 1 Drop as needed.     • multivitamin (THERAGRAN) Tab Take 1 Tablet by mouth every day. MVI     • allopurinol (ZYLOPRIM) 100 MG Tab TAKE 1 TABLET BY MOUTH  DAILY 90 Tablet 3   • omeprazole (PRILOSEC) 20 MG delayed-release capsule TAKE 1 CAPSULE BY MOUTH  DAILY 90 Capsule 3   • buPROPion SR (WELLBUTRIN-SR) 150 MG TABLET SR 12 HR sustained-release tablet TAKE 1 TABLET BY MOUTH  DAILY 90 Tablet 3   • COMBIVENT RESPIMAT  MCG/ACT Aero Soln USE 1 INHALATION BY MOUTH 4 TIMES DAILY 12 g 2   • raloxifene (EVISTA) 60 MG Tab TAKE 1 TABLET BY MOUTH DAILY 100 Tablet 3   • felodipine (PLENDIL) 5 MG TABLET SR 24 HR Take 2 Tablets by mouth every day.  "200 Tablet 3   • rosuvastatin (CRESTOR) 20 MG Tab TAKE 1 TABLET BY MOUTH IN  THE EVENING 90 Tablet 3   • cyclobenzaprine (FLEXERIL) 10 mg Tab Take 10 mg by mouth 3 times a day as needed.     • Magnesium 400 MG Cap Take  by mouth.     • Misc. Devices Misc Portable oxygen concentrator: use daily. 1 Each 0   • albuterol (PROVENTIL) 2.5mg/3ml Nebu Soln solution for nebulization 3 mL by Nebulization route every four hours as needed for Shortness of Breath. 75 mL 11     No current facility-administered medications for this visit.     She  has a past medical history of Arthritis, Asthma, Cataract, CKD (chronic kidney disease) stage 4, GFR 15-29 ml/min ([facility]) (7/1/2013), COPD, Dental disorder, Heart burn, History of anemia, Hypercholesteremia, Hypertension, Indigestion, Ovarian neoplasm (8/10/2017), Pneumonia (? 2012), Pulmonary emphysema ([facility]), Stroke ([facility]) (2016), and Vitamin d deficiency (7/1/2013).    ROS  No chest pain, no shortness of breath, no abdominal pain       Objective:     BP (!) 140/68   Pulse 91   Temp 36 °C (96.8 °F) (Temporal)   Resp (!) 22   Ht 1.626 m (5' 4\")   SpO2 94%  Body mass index is 32.96 kg/m².   Physical Exam:  Constitutional: Alert, no distress.  Skin: Warm, dry, good turgor, no rashes in visible areas.  Eye: Equal, round and reactive, conjunctiva clear, lids normal.  ENMT: Lips without lesions, good dentition, oropharynx clear.  Neck: Trachea midline, no masses, no thyromegaly. No cervical or supraclavicular lymphadenopathy  Respiratory: Unlabored respiratory effort, lungs clear to auscultation, no wheezes, no ronchi.  Cardiovascular: Normal S1, S2, no murmur, no edema.  Psych: Alert and oriented x3, normal affect and mood.        Assessment and Plan:   The following treatment plan was discussed    1. Chronic respiratory failure with hypoxia (HCC)    - DX-CHEST-2 VIEWS; Future    2. Shortness of breath    - DX-CHEST-2 VIEWS; Future    3. Encounter for screening mammogram for breast " cancer    - MA-SCREENING MAMMO BILAT W/CAD; Future      Followup: Return if symptoms worsen or fail to improve, for as scheduled in July .

## 2022-05-27 NOTE — ASSESSMENT & PLAN NOTE
She has been on oxygen supplementation.   She has increased shortness of breath, she also has mid back pain x 3 weeks, fatigue, will treat as an outpatient pneumonia.   zpack has helped in the past.   rx provided with one refill.   She was feeling well before this episode, was working well with PT with Carlos Almonte with [facility].

## 2022-06-16 ENCOUNTER — HOSPITAL ENCOUNTER (OUTPATIENT)
Dept: RADIOLOGY | Facility: MEDICAL CENTER | Age: 72
End: 2022-06-16
Attending: FAMILY MEDICINE
Payer: MEDICARE

## 2022-06-16 DIAGNOSIS — Z78.0 MENOPAUSE: ICD-10-CM

## 2022-06-16 DIAGNOSIS — Z12.31 ENCOUNTER FOR SCREENING MAMMOGRAM FOR BREAST CANCER: ICD-10-CM

## 2022-06-16 PROCEDURE — 77080 DXA BONE DENSITY AXIAL: CPT

## 2022-06-16 PROCEDURE — 77063 BREAST TOMOSYNTHESIS BI: CPT

## 2022-06-16 NOTE — RESULT ENCOUNTER NOTE
Released to ashlie Aguilar,  Your dexa scan shows osteoporosis of the spine and osteopenia of left femur. Continue calcium 500mg daily, vitamin d 8152-7363 units daily and regular exercise like walking and light weights. We will discuss other treatment options at your visit 8/11, please make sooner appointment if you would need.   Maisha Bay M.D.

## 2022-06-17 NOTE — RESULT ENCOUNTER NOTE
Released to ashlie Aguilar  Your mammogram was normal! Next is due in one year.   Please let me know immediately if you notice any new lumps/rashes/pain/nipple discharge.  Maisha Bay M.D.

## 2022-08-11 ENCOUNTER — OFFICE VISIT (OUTPATIENT)
Dept: MEDICAL GROUP | Facility: PHYSICIAN GROUP | Age: 72
End: 2022-08-11
Payer: MEDICARE

## 2022-08-11 VITALS
DIASTOLIC BLOOD PRESSURE: 70 MMHG | RESPIRATION RATE: 18 BRPM | SYSTOLIC BLOOD PRESSURE: 136 MMHG | BODY MASS INDEX: 33.29 KG/M2 | OXYGEN SATURATION: 92 % | HEIGHT: 64 IN | TEMPERATURE: 97.6 F | HEART RATE: 96 BPM | WEIGHT: 195 LBS

## 2022-08-11 DIAGNOSIS — Z74.09 IMPAIRED MOBILITY: ICD-10-CM

## 2022-08-11 DIAGNOSIS — E21.1 HYPERPARATHYROIDISM DUE TO VITAMIN D DEFICIENCY (HCC): ICD-10-CM

## 2022-08-11 DIAGNOSIS — J96.11 CHRONIC RESPIRATORY FAILURE WITH HYPOXIA (HCC): ICD-10-CM

## 2022-08-11 DIAGNOSIS — J43.8 OTHER EMPHYSEMA (HCC): ICD-10-CM

## 2022-08-11 DIAGNOSIS — Z91.89 AT RISK FOR BREAST CANCER: ICD-10-CM

## 2022-08-11 DIAGNOSIS — M81.0 AGE-RELATED OSTEOPOROSIS WITHOUT CURRENT PATHOLOGICAL FRACTURE: ICD-10-CM

## 2022-08-11 DIAGNOSIS — R53.81 DEBILITY: ICD-10-CM

## 2022-08-11 PROBLEM — N18.30 STAGE 3 CHRONIC KIDNEY DISEASE: Status: RESOLVED | Noted: 2017-08-11 | Resolved: 2022-08-11

## 2022-08-11 PROCEDURE — 99214 OFFICE O/P EST MOD 30 MIN: CPT | Performed by: FAMILY MEDICINE

## 2022-08-11 RX ORDER — ALENDRONATE SODIUM 70 MG/1
70 TABLET ORAL
Qty: 12 TABLET | Refills: 3 | Status: SHIPPED | OUTPATIENT
Start: 2022-08-11 | End: 2022-08-11

## 2022-08-11 ASSESSMENT — FIBROSIS 4 INDEX: FIB4 SCORE: 1.55

## 2022-08-11 NOTE — PROGRESS NOTES
Subjective:   Lauren Bowden is a 72 y.o. female here today for evaluation and management of:     Age-related osteoporosis without current pathological fracture  dexa 2022 showed osteoporosis.   She is on ca, vit d  Discussed and rx for fosamax 70 mg po weekly provided. Should take this for 5 yrs and stop.   She is careful when walking to avoid falls.       Hyperparathyroidism due to vitamin D deficiency (HCC)  pth slightly elevated 74  Has osteoporosis, is on ca, vit d  Repeat labs ordered.       Chronic respiratory failure with hypoxia (HCC)  Continues on oxygen supplementation  She is on inhalers   Has chronic shortness of breath  She is due for PFTs       Current medicines (including changes today)  Current Outpatient Medications   Medication Sig Dispense Refill    losartan (COZAAR) 50 MG Tab TAKE 1 TABLET BY MOUTH  DAILY 90 Tablet 3    clopidogrel (PLAVIX) 75 MG Tab TAKE 1 TABLET BY MOUTH  DAILY 90 Tablet 3    furosemide (LASIX) 20 MG Tab TAKE 1 TABLET BY MOUTH  TWICE DAILY 180 Tablet 3    carboxymethylcellulose 1 % Gel 1 Drop as needed.      multivitamin (THERAGRAN) Tab Take 1 Tablet by mouth every day. MVI      allopurinol (ZYLOPRIM) 100 MG Tab TAKE 1 TABLET BY MOUTH  DAILY 90 Tablet 3    omeprazole (PRILOSEC) 20 MG delayed-release capsule TAKE 1 CAPSULE BY MOUTH  DAILY 90 Capsule 3    buPROPion SR (WELLBUTRIN-SR) 150 MG TABLET SR 12 HR sustained-release tablet TAKE 1 TABLET BY MOUTH  DAILY 90 Tablet 3    COMBIVENT RESPIMAT  MCG/ACT Aero Soln USE 1 INHALATION BY MOUTH 4 TIMES DAILY 12 g 2    raloxifene (EVISTA) 60 MG Tab TAKE 1 TABLET BY MOUTH DAILY 100 Tablet 3    felodipine (PLENDIL) 5 MG TABLET SR 24 HR Take 2 Tablets by mouth every day. 200 Tablet 3    rosuvastatin (CRESTOR) 20 MG Tab TAKE 1 TABLET BY MOUTH IN  THE EVENING 90 Tablet 3    cyclobenzaprine (FLEXERIL) 10 mg Tab Take 10 mg by mouth 3 times a day as needed.      Magnesium 400 MG Cap Take  by mouth.      Misc. Devices Misc Portable  "oxygen concentrator: use daily. 1 Each 0    albuterol (PROVENTIL) 2.5mg/3ml Nebu Soln solution for nebulization 3 mL by Nebulization route every four hours as needed for Shortness of Breath. 75 mL 11     No current facility-administered medications for this visit.     She  has a past medical history of Arthritis, Asthma, Cataract, CKD (chronic kidney disease) stage 4, GFR 15-29 ml/min ([facility]) (7/1/2013), COPD, Dental disorder, Heart burn, History of anemia, Hypercholesteremia, Hypertension, Indigestion, Ovarian neoplasm (8/10/2017), Pneumonia (? 2012), Pulmonary emphysema ([facility]), Stroke ([facility]) (2016), and Vitamin d deficiency (7/1/2013).    ROS  No chest pain, no shortness of breath, no abdominal pain       Objective:     /70 (BP Location: Left arm, Patient Position: Sitting, BP Cuff Size: Small adult)   Pulse 96   Temp 36.4 °C (97.6 °F) (Temporal)   Resp 18   Ht 1.626 m (5' 4\")   Wt 88.5 kg (195 lb)   SpO2 92%  Body mass index is 33.47 kg/m².   Physical Exam:   Constitutional: Alert, no distress.  Skin: Warm, dry, good turgor, no rashes in visible areas.  Eye: Equal, round and reactive, conjunctiva clear, lids normal.  ENMT: Lips without lesions, good dentition, oropharynx clear.  Neck: Trachea midline, no masses, no thyromegaly. No cervical or supraclavicular lymphadenopathy  Respiratory:  lungs clear to auscultation,  no ronchi. Mild wheezing, mild shortness of breath at rest.    Cardiovascular: Normal S1, S2, no murmur, no edema.  Abdomen: Soft, non-tender, no masses, no hepatosplenomegaly.  Psych: Alert and oriented x3, normal affect and mood.        Assessment and Plan:   The following treatment plan was discussed    1. Other emphysema (HCC)  - PULMONARY FUNCTION TESTS -Test requested: Complete Pulmonary Function Test; Future  - Referral to Pulmonary and Sleep Medicine  - CBC WITH DIFFERENTIAL; Future    2. Age-related osteoporosis without current pathological fracture  - Comp Metabolic Panel; " Future    3. At risk for breast cancer  - Referral to Genetic Research Studies    4. Hyperparathyroidism due to vitamin D deficiency (HCC)  - Comp Metabolic Panel; Future    5. Chronic respiratory failure with hypoxia (HCC)  - Referral to Pulmonary and Sleep Medicine  - CBC WITH DIFFERENTIAL; Future    6. Debility  - Referral to Home Health    7. Impaired mobility  - Referral to Home Health      Followup: Return in about 6 months (around 2/11/2023) for emphysema.

## 2022-08-11 NOTE — ASSESSMENT & PLAN NOTE
dexa 2022 showed osteoporosis.   She is on ca, vit d  Discussed and rx for fosamax 70 mg po weekly provided. Should take this for 5 yrs and stop.   She is careful when walking to avoid falls.

## 2022-08-11 NOTE — PATIENT INSTRUCTIONS
Take alendronate pill for bone density with a large glass of water. Avoid lying down for one hour after taking the medication.     Please check [facility] for your referral information or call the referral department at .   You can also send me a [employer] message regarding your referral information.   Please note you will probably not get a call regarding the referral.

## 2022-08-11 NOTE — ASSESSMENT & PLAN NOTE
Continues on oxygen supplementation  She is on inhalers   Has chronic shortness of breath  She is due for PFTs

## 2022-08-15 ENCOUNTER — RESEARCH ENCOUNTER (OUTPATIENT)
Dept: RESEARCH | Facility: WORKSITE | Age: 72
End: 2022-08-15
Payer: MEDICARE

## 2022-08-24 ENCOUNTER — TELEPHONE (OUTPATIENT)
Dept: MEDICAL GROUP | Facility: PHYSICIAN GROUP | Age: 72
End: 2022-08-24
Payer: MEDICARE

## 2022-08-24 ASSESSMENT — ENCOUNTER SYMPTOMS
SHORTNESS OF BREATH: 1
WHEEZING: 1
CHEST TIGHTNESS: 0
HEMOPTYSIS: 0
RESPIRATORY SYMPTOMS COMMENTS: ALWAYS
DYSPNEA AT REST: 1

## 2022-08-24 NOTE — PROGRESS NOTES
Pulmonary Clinic- Initial Consult    Date of Service: 8/25/22     Referring Physician: Maisha Bay M.D.    Reason for Consult: Emphysema    Chief Complaint:   Chief Complaint   Patient presents with    Establish Care     Referred by Phu Ferrera for Other emphysema,  chronic Respiratory failure with hypoxia     Results     DX-Chest 05/27/22       HPI:   Lauren Bowden is a 72 y.o. female who is followed by Dr. Bay and is referred to the pulmonary clinic for emphysema. She was diagnosed with COPD about 15 years ago with PFTs unfortunately unavailable for review.  She has been on spiriva in the past but most recently only on Combivent and nebulizers as needed.  She is on supplemental O2 ATC at 3L and finds the portable concentrator is heavy and cumbersome to use so comes in today on room air saturating 83%.  She used to have 2 exacerbations every year but when she quit smoking 10 years ago that got much better.  Last exacerbation was in May of this year.  She is breathless a lot and cannot walk far without having worsening RIVERO.  She coughs daily and brings up clear sputum.  MMRC 4.      Pulmonary Specific History:  She denies any prior lung problems prior to her history of COPD.  She started smoking when she was 19 years old and quit 10 years ago, approximately 43-pack-year smoking history.  She had her last COPD exacerbation in May of this year.  She does have a history of GERD which she treats with omeprazole.  She has a history of allergies but does not treat them routinely.  She also has a history of postnasal drip which is untreated.  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  She is currently using Combivent multiple times a day and has no maintenance inhalers.      Past Medical History:   Diagnosis Date    Arthritis     all over, history of gout both feet, great toes    Asthma     daily inhalers    Cataract     CKD (chronic  kidney disease) stage 4, GFR 15-29 ml/min ([facility]) 07/01/2013    Did see Dr. Perkins; current GFR 27 6/26/13; Has both kidneys, only one is functioning.    COPD     Cough     Dental disorder     lower partial    Heart burn     History of anemia     Hypercholesteremia     Hypertension     Indigestion     Ovarian neoplasm 08/10/2017    Incidental finding of the CT Renal  - 6.8 x 6.8 x 6 cm cystic left adnexal lesion may represent an ovarian cystadenoma or cystadenocarcinoma.      Pneumonia ? 2012    Pulmonary emphysema ([facility])     Oxygen as needed, through [employer], will be switching to [employer]    Shortness of breath     Sputum production     Stroke ([facility]) 2016    pt states it was a TIA no residual on plavix    Vitamin d deficiency 07/01/2013    Was taking 2000 - still 26; incr to 4000    Wheezing        Past Surgical History:   Procedure Laterality Date    MINI LAPAROTOMY N/A 2/7/2019    Procedure: MINI LAPAROTOMY- EXPLORATORY;  Surgeon: Henrry Centeno M.D.;  Location: SURGERY SAME DAY [facility];  Service: Gynecology    ABDOMINAL HYSTERECTOMY TOTAL N/A 2/7/2019    Procedure: ABDOMINAL HYSTERECTOMY TOTAL;  Surgeon: Henrry Centeno M.D.;  Location: SURGERY SAME DAY [facility];  Service: Gynecology    SALPINGO OOPHORECTOMY Bilateral 2/7/2019    Procedure: SALPINGO OOPHORECTOMY;  Surgeon: Henrry Centeno M.D.;  Location: SURGERY SAME DAY [facility];  Service: Gynecology    CATARACT PHACO WITH IOL Right 1/29/2019    Procedure: CATARACT PHACO WITH IOL;  Surgeon: Xander Hemphill M.D.;  Location: SURGERY SAME DAY [facility];  Service: Ophthalmology    CATARACT PHACO WITH IOL Left 1/15/2019    Procedure: CATARACT PHACO WITH IOL;  Surgeon: Xander Hemphill M.D.;  Location: SURGERY SAME DAY [facility];  Service: Ophthalmology    APPENDECTOMY      years ago, ?2000       Social History     Socioeconomic History    Marital status:      Spouse name: Not on file    Number of children: Not on file    Years of  education: Not on file    Highest education level: Not on file   Occupational History    Not on file   Tobacco Use    Smoking status: Former     Packs/day: 1.00     Years: 40.00     Pack years: 40.00     Types: Cigarettes     Quit date: 10/1/2010     Years since quittin.9    Smokeless tobacco: Never   Vaping Use    Vaping Use: Never used   Substance and Sexual Activity    Alcohol use: Not Currently     Alcohol/week: 2.4 oz     Types: 2 Cans of beer, 2 Shots of liquor per week     Comment: 2 a day    Drug use: No    Sexual activity: Never     Partners: Male     Comment:    Other Topics Concern    Not on file   Social History Narrative    Not on file     Social Determinants of Health     Financial Resource Strain: Not on file   Food Insecurity: Not on file   Transportation Needs: Not on file   Physical Activity: Not on file   Stress: Not on file   Social Connections: Not on file   Intimate Partner Violence: Not on file   Housing Stability: Not on file          Family History   Problem Relation Age of Onset    Diabetes Sister     Cancer Sister        Current Outpatient Medications on File Prior to Visit   Medication Sig Dispense Refill    losartan (COZAAR) 50 MG Tab TAKE 1 TABLET BY MOUTH  DAILY 90 Tablet 3    clopidogrel (PLAVIX) 75 MG Tab TAKE 1 TABLET BY MOUTH  DAILY 90 Tablet 3    furosemide (LASIX) 20 MG Tab TAKE 1 TABLET BY MOUTH  TWICE DAILY 180 Tablet 3    carboxymethylcellulose 1 % Gel 1 Drop as needed.      multivitamin (THERAGRAN) Tab Take 1 Tablet by mouth every day. MVI      allopurinol (ZYLOPRIM) 100 MG Tab TAKE 1 TABLET BY MOUTH  DAILY 90 Tablet 3    omeprazole (PRILOSEC) 20 MG delayed-release capsule TAKE 1 CAPSULE BY MOUTH  DAILY 90 Capsule 3    buPROPion SR (WELLBUTRIN-SR) 150 MG TABLET SR 12 HR sustained-release tablet TAKE 1 TABLET BY MOUTH  DAILY 90 Tablet 3    COMBIVENT RESPIMAT  MCG/ACT Aero Soln USE 1 INHALATION BY MOUTH 4 TIMES DAILY 12 g 2    raloxifene (EVISTA) 60 MG Tab  "TAKE 1 TABLET BY MOUTH DAILY 100 Tablet 3    felodipine (PLENDIL) 5 MG TABLET SR 24 HR Take 2 Tablets by mouth every day. 200 Tablet 3    rosuvastatin (CRESTOR) 20 MG Tab TAKE 1 TABLET BY MOUTH IN  THE EVENING 90 Tablet 3    cyclobenzaprine (FLEXERIL) 10 mg Tab Take 10 mg by mouth 3 times a day as needed.      Magnesium 400 MG Cap Take  by mouth.      Misc. Devices Misc Portable oxygen concentrator: use daily. 1 Each 0    albuterol (PROVENTIL) 2.5mg/3ml Nebu Soln solution for nebulization 3 mL by Nebulization route every four hours as needed for Shortness of Breath. 75 mL 11     No current facility-administered medications on file prior to visit.       Allergies: Patient has no known allergies.      ROS:   Review of Systems   Constitutional:  Negative for chills and fever.   HENT:  Positive for congestion and sinus pain.    Respiratory:  Positive for cough, sputum production, shortness of breath and wheezing. Negative for hemoptysis.    Cardiovascular:  Negative for chest pain, palpitations and leg swelling.   Gastrointestinal:  Positive for heartburn.   Genitourinary:  Negative for dysuria.   Musculoskeletal:  Negative for myalgias.   Neurological:  Negative for dizziness and headaches.   Endo/Heme/Allergies:  Positive for environmental allergies.     Vitals:  /74 (BP Location: Left arm, Patient Position: Sitting, BP Cuff Size: Adult)   Pulse 91   Ht 1.626 m (5' 4\")   Wt 88.5 kg (195 lb)   SpO2 94%     Physical Exam:  Physical Exam  Constitutional:       Appearance: Normal appearance.   HENT:      Head: Normocephalic and atraumatic.      Mouth/Throat:      Mouth: Mucous membranes are moist.   Eyes:      Extraocular Movements: Extraocular movements intact.   Cardiovascular:      Rate and Rhythm: Normal rate and regular rhythm.      Heart sounds: Normal heart sounds.   Pulmonary:      Effort: Pulmonary effort is normal. No respiratory distress.      Breath sounds: No wheezing or rales.      Comments: " Diminished breath sounds throughout  Abdominal:      Palpations: Abdomen is soft.   Musculoskeletal:         General: No swelling, tenderness or deformity.   Skin:     General: Skin is warm and dry.   Neurological:      General: No focal deficit present.      Mental Status: She is alert and oriented to person, place, and time.       Laboratory Data:      Pertinent Studies:    PFTs as reviewed by me personally show: none    Imaging as reviewed by me personally show:  none    Echo: none      Assessment/Plan:    Problem List Items Addressed This Visit       Chronic respiratory failure with hypoxia (HCC)     Patient has a history of COPD diagnosed 15 years ago with PFTs.  Plan  See plan for COPD emphysema         Other emphysema (HCC)     Gold Class unknown due to no PFT COPD.  Plan:  - Maintenance therapy: Start on spiriva today (sample provided due to home Rx from mail pharmacy and will take a while), ordered Anoro for maintenance  - Continue Albuterol as needed  - Continue Oxygen therapy  - Routine vaccinations are current  - Educated on COPD exacerbation signs, return precautions and when to contact clinic or come in to ER.   - CT scan ordered for lung cancer screening, discussion with patient and we cooperatively agreed to proceed with screening  - continue treatment for allergies - recommended triple therapy for PND  - Discussed BLVR, patient to consider while we obtain baseline studies to assess her candidacy   - Follow up in 3 months           Relevant Medications    umeclidinium-vilanterol (ANORO ELLIPTA) 62.5-25 MCG/INH AEROSOL POWDER, BREATH ACTIVATED inhaler    Tiotropium Bromide Monohydrate (SPIRIVA RESPIMAT) 1.25 MCG/ACT Aero Soln     Other Visit Diagnoses       Centrilobular emphysema (HCC)        Relevant Medications    umeclidinium-vilanterol (ANORO ELLIPTA) 62.5-25 MCG/INH AEROSOL POWDER, BREATH ACTIVATED inhaler    Tiotropium Bromide Monohydrate (SPIRIVA RESPIMAT) 1.25 MCG/ACT Aero Soln    Other  Relevant Orders    PULMONARY FUNCTION TESTS -Test requested: Complete Pulmonary Function Test    CT-CHEST LOW DOSE W/O             Return in about 3 months (around 11/25/2022).     This note was generated using voice recognition software which has a chance of producing errors of grammar and possibly content.  I have made every reasonable attempt to find and correct any obvious errors, but it should be expected that some may not be found prior to finalization of this note.    Time spent in record review prior to patient arrival, reviewing results, and in face-to-face encounter totaled 45 min, excluding any procedures if performed.      Alis Travis MD RD  Pulmonary and Critical Care Medicine  [facility]

## 2022-08-24 NOTE — TELEPHONE ENCOUNTER
START OF HOME CARE   Pt ot skilled nurse and nurse aid     Has some depression     [facility]du center well     Verbal orders given

## 2022-08-25 ENCOUNTER — OFFICE VISIT (OUTPATIENT)
Dept: SLEEP MEDICINE | Facility: MEDICAL CENTER | Age: 72
End: 2022-08-25
Payer: MEDICARE

## 2022-08-25 VITALS
WEIGHT: 195 LBS | HEIGHT: 64 IN | BODY MASS INDEX: 33.29 KG/M2 | HEART RATE: 91 BPM | SYSTOLIC BLOOD PRESSURE: 130 MMHG | OXYGEN SATURATION: 94 % | DIASTOLIC BLOOD PRESSURE: 74 MMHG

## 2022-08-25 DIAGNOSIS — J43.8 OTHER EMPHYSEMA (HCC): ICD-10-CM

## 2022-08-25 DIAGNOSIS — J96.11 CHRONIC RESPIRATORY FAILURE WITH HYPOXIA (HCC): ICD-10-CM

## 2022-08-25 DIAGNOSIS — J43.2 CENTRILOBULAR EMPHYSEMA (HCC): ICD-10-CM

## 2022-08-25 PROCEDURE — 99204 OFFICE O/P NEW MOD 45 MIN: CPT | Performed by: INTERNAL MEDICINE

## 2022-08-25 RX ORDER — TIOTROPIUM BROMIDE INHALATION SPRAY 1.56 UG/1
2 SPRAY, METERED RESPIRATORY (INHALATION) DAILY
Qty: 1 EACH | Refills: 0 | COMMUNITY
Start: 2022-08-25 | End: 2022-10-11

## 2022-08-25 ASSESSMENT — FIBROSIS 4 INDEX: FIB4 SCORE: 1.55

## 2022-08-25 ASSESSMENT — ENCOUNTER SYMPTOMS
SINUS PAIN: 1
MYALGIAS: 0
PALPITATIONS: 0
WHEEZING: 1
HEADACHES: 0
FEVER: 0
SPUTUM PRODUCTION: 1
HEARTBURN: 1
CHILLS: 0
HEMOPTYSIS: 0
COUGH: 1
SHORTNESS OF BREATH: 1
DIZZINESS: 0

## 2022-08-26 NOTE — ASSESSMENT & PLAN NOTE
Gold Class unknown due to no PFT COPD.  Plan:  - Maintenance therapy: Start on spiriva today (sample provided due to home Rx from mail pharmacy and will take a while), ordered Anoro for maintenance  - Continue Albuterol as needed  - Continue Oxygen therapy  - Routine vaccinations are current  - Educated on COPD exacerbation signs, return precautions and when to contact clinic or come in to ER.   - CT scan ordered for lung cancer screening, discussion with patient and we cooperatively agreed to proceed with screening  - continue treatment for allergies - recommended triple therapy for PND  - Discussed BLVR, patient to consider while we obtain baseline studies to assess her candidacy   - Follow up in 3 months

## 2022-08-26 NOTE — ASSESSMENT & PLAN NOTE
Patient has a history of COPD diagnosed 15 years ago with PFTs.  Plan  See plan for COPD emphysema

## 2022-08-31 DIAGNOSIS — J43.2 CENTRILOBULAR EMPHYSEMA (HCC): ICD-10-CM

## 2022-08-31 NOTE — TELEPHONE ENCOUNTER
Needs a 90 day supply for mail order  Have we ever prescribed this med? Yes.  If yes, what date? 08/25/22    Last OV: 08/25/22 with Dr Travis    Next OV: 11/30/22 with Dr Travis    DX: Centrilobular emphysema (HCC) (J43.2)    Medications:   Requested Prescriptions     Pending Prescriptions Disp Refills    umeclidinium-vilanterol (ANORO ELLIPTA) 62.5-25 MCG/INH AEROSOL POWDER, BREATH ACTIVATED inhaler 3 Each 3     Sig: Inhale 1 Puff every day.

## 2022-09-08 ENCOUNTER — TELEPHONE (OUTPATIENT)
Dept: URGENT CARE | Facility: PHYSICIAN GROUP | Age: 72
End: 2022-09-08
Payer: MEDICARE

## 2022-09-13 ENCOUNTER — TELEPHONE (OUTPATIENT)
Dept: SLEEP MEDICINE | Facility: MEDICAL CENTER | Age: 72
End: 2022-09-13
Payer: MEDICARE

## 2022-09-13 DIAGNOSIS — R06.02 SHORTNESS OF BREATH: ICD-10-CM

## 2022-09-13 NOTE — TELEPHONE ENCOUNTER
VOICEMAIL  1. Caller Name: Lauren                       Call Back Number: [phone]    2. Message: pt lvm stating she called [phone] to schedule a ct that was ordered 08/25/22 but the imaging dept was not able to find order.      3. Patient approves office to leave a detailed voicemail/MyChart message: N\A     Called and spoke to imaging dept and they stated that it was not approved due to needing a referral sent to Lung Cancer Screening before being able to schedule appt.

## 2022-09-14 NOTE — TELEPHONE ENCOUNTER
Pt was referred for LCA screening program and saw Leeann ALFONSO on 02/04/21 and a CT-LCA was order. But never done.     Called and spoke with Sade in Oncology.Notified her of this. She said she is going to look into it and let me know.

## 2022-09-20 NOTE — TELEPHONE ENCOUNTER
Changed to CT Chest w/o     Alis Travis MD RD  Pulmonary and Critical Care    Available on Voalte;    VOICEMAIL: 09/14/22  1. Caller Name: LUCITA Stuart with Leeann ALFONSO                      Call Back Number: ext [phone]    2. Message: Sade called and lm. She spoke with Leeann ALFONSO.  She said possibly it can be the Dx's we used.  We can use these:  1.) F17.210-current smoker    2.) F17.211-Quit smoking < 6 months   3.) Z87.891 -Quit smoking > 6 months    She suggested us to use these codes    3. Patient approves office to leave a detailed voicemail/MyChart message: N\A

## 2022-09-23 DIAGNOSIS — Z72.0 TOBACCO USE: ICD-10-CM

## 2022-09-23 RX ORDER — IPRATROPIUM BROMIDE AND ALBUTEROL 20; 100 UG/1; UG/1
SPRAY, METERED RESPIRATORY (INHALATION)
Qty: 12 G | Refills: 2 | Status: SHIPPED | OUTPATIENT
Start: 2022-09-23 | End: 2022-10-11

## 2022-09-23 NOTE — TELEPHONE ENCOUNTER
Received request via: Pharmacy    Was the patient seen in the last year in this department? Yes    Does the patient have an active prescription (recently filled or refills available) for medication(s) requested? No    Last OV 8/11/2022  Next OV 10/11/2022

## 2022-09-26 ENCOUNTER — HOSPITAL ENCOUNTER (OUTPATIENT)
Facility: MEDICAL CENTER | Age: 72
End: 2022-09-26
Attending: FAMILY MEDICINE
Payer: COMMERCIAL

## 2022-09-26 DIAGNOSIS — J43.8 OTHER EMPHYSEMA (HCC): ICD-10-CM

## 2022-09-26 DIAGNOSIS — E21.1 HYPERPARATHYROIDISM DUE TO VITAMIN D DEFICIENCY (HCC): ICD-10-CM

## 2022-09-26 DIAGNOSIS — M81.0 AGE-RELATED OSTEOPOROSIS WITHOUT CURRENT PATHOLOGICAL FRACTURE: ICD-10-CM

## 2022-09-26 DIAGNOSIS — J96.11 CHRONIC RESPIRATORY FAILURE WITH HYPOXIA (HCC): ICD-10-CM

## 2022-09-26 LAB
ALBUMIN SERPL BCP-MCNC: 4.2 G/DL (ref 3.2–4.9)
ALBUMIN/GLOB SERPL: 1.5 G/DL
ALP SERPL-CCNC: 73 U/L (ref 30–99)
ALT SERPL-CCNC: 18 U/L (ref 2–50)
ANION GAP SERPL CALC-SCNC: 9 MMOL/L (ref 7–16)
AST SERPL-CCNC: 22 U/L (ref 12–45)
BASOPHILS # BLD AUTO: 1.1 % (ref 0–1.8)
BASOPHILS # BLD: 0.07 K/UL (ref 0–0.12)
BILIRUB SERPL-MCNC: 0.2 MG/DL (ref 0.1–1.5)
BUN SERPL-MCNC: 24 MG/DL (ref 8–22)
CALCIUM SERPL-MCNC: 9.5 MG/DL (ref 8.5–10.5)
CHLORIDE SERPL-SCNC: 103 MMOL/L (ref 96–112)
CO2 SERPL-SCNC: 27 MMOL/L (ref 20–33)
CREAT SERPL-MCNC: 1.68 MG/DL (ref 0.5–1.4)
EOSINOPHIL # BLD AUTO: 0.15 K/UL (ref 0–0.51)
EOSINOPHIL NFR BLD: 2.4 % (ref 0–6.9)
ERYTHROCYTE [DISTWIDTH] IN BLOOD BY AUTOMATED COUNT: 44.7 FL (ref 35.9–50)
GFR SERPLBLD CREATININE-BSD FMLA CKD-EPI: 32 ML/MIN/1.73 M 2
GLOBULIN SER CALC-MCNC: 2.8 G/DL (ref 1.9–3.5)
GLUCOSE SERPL-MCNC: 105 MG/DL (ref 65–99)
HCT VFR BLD AUTO: 42.6 % (ref 37–47)
HGB BLD-MCNC: 13.8 G/DL (ref 12–16)
IMM GRANULOCYTES # BLD AUTO: 0.06 K/UL (ref 0–0.11)
IMM GRANULOCYTES NFR BLD AUTO: 1 % (ref 0–0.9)
LYMPHOCYTES # BLD AUTO: 2.74 K/UL (ref 1–4.8)
LYMPHOCYTES NFR BLD: 44.3 % (ref 22–41)
MCH RBC QN AUTO: 31.1 PG (ref 27–33)
MCHC RBC AUTO-ENTMCNC: 32.4 G/DL (ref 33.6–35)
MCV RBC AUTO: 95.9 FL (ref 81.4–97.8)
MONOCYTES # BLD AUTO: 0.46 K/UL (ref 0–0.85)
MONOCYTES NFR BLD AUTO: 7.4 % (ref 0–13.4)
NEUTROPHILS # BLD AUTO: 2.71 K/UL (ref 2–7.15)
NEUTROPHILS NFR BLD: 43.8 % (ref 44–72)
NRBC # BLD AUTO: 0 K/UL
NRBC BLD-RTO: 0 /100 WBC
PLATELET # BLD AUTO: 218 K/UL (ref 164–446)
PMV BLD AUTO: 10.4 FL (ref 9–12.9)
POTASSIUM SERPL-SCNC: 5.1 MMOL/L (ref 3.6–5.5)
PROT SERPL-MCNC: 7 G/DL (ref 6–8.2)
RBC # BLD AUTO: 4.44 M/UL (ref 4.2–5.4)
SODIUM SERPL-SCNC: 139 MMOL/L (ref 135–145)
WBC # BLD AUTO: 6.2 K/UL (ref 4.8–10.8)

## 2022-09-26 PROCEDURE — 80053 COMPREHEN METABOLIC PANEL: CPT

## 2022-09-26 PROCEDURE — 85025 COMPLETE CBC W/AUTO DIFF WBC: CPT

## 2022-09-26 NOTE — RESEARCH NOTE
Virtual appointment completed to enroll patient into [facility]. Consent form signed and mailing address confirmed.   
[phone]

## 2022-09-28 DIAGNOSIS — R53.81 DEBILITY: ICD-10-CM

## 2022-09-28 DIAGNOSIS — J96.11 CHRONIC RESPIRATORY FAILURE WITH HYPOXIA (HCC): ICD-10-CM

## 2022-09-28 DIAGNOSIS — Z74.09 IMPAIRED MOBILITY: ICD-10-CM

## 2022-10-05 RX ORDER — PANTOPRAZOLE SODIUM 20 MG/1
20 TABLET, DELAYED RELEASE ORAL DAILY
Qty: 90 TABLET | Refills: 3 | Status: SHIPPED | OUTPATIENT
Start: 2022-10-05 | End: 2023-08-24

## 2022-10-06 ENCOUNTER — HOSPITAL ENCOUNTER (OUTPATIENT)
Dept: RADIOLOGY | Facility: MEDICAL CENTER | Age: 72
End: 2022-10-06
Attending: INTERNAL MEDICINE
Payer: MEDICARE

## 2022-10-06 DIAGNOSIS — R06.02 SHORTNESS OF BREATH: ICD-10-CM

## 2022-10-06 PROCEDURE — 71250 CT THORAX DX C-: CPT

## 2022-10-07 ENCOUNTER — TELEPHONE (OUTPATIENT)
Dept: MEDICAL GROUP | Facility: PHYSICIAN GROUP | Age: 72
End: 2022-10-07
Payer: MEDICARE

## 2022-10-07 NOTE — TELEPHONE ENCOUNTER
----- Message from Maisha Bay M.D. sent at 10/5/2022 12:44 PM PDT -----  Please let pt know I got a notification from pharmacy that she is on omeprazole and plavix and omeprazole interferes with absorption and good function of plavix.   I've sent a Rx for pantoprazole (protonix) instead which does not have this interaction.   Maisha Bay M.D.

## 2022-10-07 NOTE — TELEPHONE ENCOUNTER
Phone Number Called: [phone] (home)     Call outcome:  Spoke to patient regarding message below.    Message: Called to inform patient of message from Dr. Bay regarding change of medication omeprazole to pantoprazole.  Patient verbalized understanding.

## 2022-10-11 ENCOUNTER — OFFICE VISIT (OUTPATIENT)
Dept: MEDICAL GROUP | Facility: PHYSICIAN GROUP | Age: 72
End: 2022-10-11
Payer: MEDICARE

## 2022-10-11 VITALS
SYSTOLIC BLOOD PRESSURE: 120 MMHG | DIASTOLIC BLOOD PRESSURE: 82 MMHG | HEIGHT: 64 IN | WEIGHT: 196 LBS | BODY MASS INDEX: 33.46 KG/M2 | TEMPERATURE: 97.6 F | RESPIRATION RATE: 16 BRPM | HEART RATE: 84 BPM | OXYGEN SATURATION: 95 %

## 2022-10-11 DIAGNOSIS — Z74.09 IMPAIRED MOBILITY: ICD-10-CM

## 2022-10-11 DIAGNOSIS — Z91.81 AT HIGH RISK FOR FALLS: ICD-10-CM

## 2022-10-11 DIAGNOSIS — E55.9 VITAMIN D DEFICIENCY: ICD-10-CM

## 2022-10-11 DIAGNOSIS — E66.9 OBESITY (BMI 30-39.9): ICD-10-CM

## 2022-10-11 DIAGNOSIS — R53.81 DEBILITY: ICD-10-CM

## 2022-10-11 DIAGNOSIS — E78.5 DYSLIPIDEMIA: ICD-10-CM

## 2022-10-11 DIAGNOSIS — N18.32 STAGE 3B CHRONIC KIDNEY DISEASE (CKD): ICD-10-CM

## 2022-10-11 DIAGNOSIS — E21.1 HYPERPARATHYROIDISM DUE TO VITAMIN D DEFICIENCY (HCC): ICD-10-CM

## 2022-10-11 DIAGNOSIS — J96.11 CHRONIC RESPIRATORY FAILURE WITH HYPOXIA (HCC): ICD-10-CM

## 2022-10-11 DIAGNOSIS — Z23 NEED FOR VACCINATION: ICD-10-CM

## 2022-10-11 DIAGNOSIS — I10 PRIMARY HYPERTENSION: ICD-10-CM

## 2022-10-11 DIAGNOSIS — E53.8 B12 DEFICIENCY: ICD-10-CM

## 2022-10-11 DIAGNOSIS — M1A.0710 CHRONIC GOUT OF RIGHT FOOT, UNSPECIFIED CAUSE: ICD-10-CM

## 2022-10-11 PROCEDURE — 90662 IIV NO PRSV INCREASED AG IM: CPT | Performed by: FAMILY MEDICINE

## 2022-10-11 PROCEDURE — 99214 OFFICE O/P EST MOD 30 MIN: CPT | Mod: 25 | Performed by: FAMILY MEDICINE

## 2022-10-11 PROCEDURE — G0008 ADMIN INFLUENZA VIRUS VAC: HCPCS | Performed by: FAMILY MEDICINE

## 2022-10-11 RX ORDER — HYDROCODONE BITARTRATE AND ACETAMINOPHEN 5; 325 MG/1; MG/1
TABLET ORAL
COMMUNITY
Start: 2022-09-28 | End: 2022-10-11

## 2022-10-11 RX ORDER — AMOXICILLIN 500 MG/1
CAPSULE ORAL
COMMUNITY
Start: 2022-10-02 | End: 2022-10-12

## 2022-10-11 ASSESSMENT — FIBROSIS 4 INDEX: FIB4 SCORE: 1.74

## 2022-10-11 NOTE — LETTER
October 11, 2022    To Dr. Cerda,     Lauren Thea Bowden was evaluated by me in clinic today. She can hold her plavix for 2 days before her dental procedure and restart it after her dental procedure.     Please contact me with any questions.     Thank you,    Maisha Bay M.D.  Electronically signed

## 2022-10-11 NOTE — PATIENT INSTRUCTIONS
Please get fasting labs, fasting for 8-10 hours before next visit. You can make an appointment for the lab or walk in.   Lab hours [facility] location: Monday to Friday 6 am - 4 pm, Saturday 7 am -noon  Even if you lose your lab paperwork, you can still come in to get your lab done.

## 2022-10-11 NOTE — PROGRESS NOTES
Subjective:   Lauren Bowden is a 72 y.o. female here today for evaluation and management of:     Chronic respiratory failure with hypoxia (HCC)  2 to 2.5 L continuous oxygen supplementation via nasal cannula.     HTN (hypertension)  Well controlled 120/82 in clinic today  On losartan 50 mg daily.   She has chronic SOB on oxygen due to COPD which is stable.   She has no CP  Uses compression socks which really help with her chronic LE edema    Stage 3b chronic kidney disease (CKD) (HCC)  Stable, GFR in 30s  Followed by nephrology every year  On losartan      Hyperparathyroidism due to vitamin D deficiency ([facility])  Mild elevated PTH, normal vitamin D  Continues on two vitamin D 2000 u daily   Latest Reference Range & Units Most Recent   25-Hydroxy   Vitamin D 25 30 - 100 ng/mL 34  3/16/22 10:10   Folate -Folic Acid >4.0 ng/mL 7.3  10/14/21 07:40   Vitamin B12 -True Cobalamin 211 - 911 pg/mL <150 (L)  10/14/21 07:40   Pth, Intact 14.0 - 72.0 pg/mL 76.4 (H)  3/16/22 10:10   (L): Data is abnormally low  (H): Data is abnormally high         Is on protonix in the mail to replace omeprazole as she is on plavix. Ok to hold plavix for 2 days before dental procedure and restart after dental procedure.     Current medicines (including changes today)  Current Outpatient Medications   Medication Sig Dispense Refill    Misc. Devices Misc 4 wheeled Walker with brakes, with seat.  Fax to [employer] Fax: [phone]  Tel: [phone] 1 Each 0    pantoprazole (PROTONIX) 20 MG tablet Take 1 Tablet by mouth every day. 90 Tablet 3    Misc. Devices Misc Power scooter power mobility device. 1 Each 0    umeclidinium-vilanterol (ANORO ELLIPTA) 62.5-25 MCG/INH AEROSOL POWDER, BREATH ACTIVATED inhaler Inhale 1 Puff every day. 3 Each 3    losartan (COZAAR) 50 MG Tab TAKE 1 TABLET BY MOUTH  DAILY 90 Tablet 3    clopidogrel (PLAVIX) 75 MG Tab TAKE 1 TABLET BY MOUTH  DAILY 90 Tablet 3    furosemide (LASIX) 20 MG Tab TAKE 1 TABLET  "BY MOUTH  TWICE DAILY 180 Tablet 3    carboxymethylcellulose 1 % Gel 1 Drop as needed.      multivitamin (THERAGRAN) Tab Take 1 Tablet by mouth every day. MVI      allopurinol (ZYLOPRIM) 100 MG Tab TAKE 1 TABLET BY MOUTH  DAILY 90 Tablet 3    buPROPion SR (WELLBUTRIN-SR) 150 MG TABLET SR 12 HR sustained-release tablet TAKE 1 TABLET BY MOUTH  DAILY 90 Tablet 3    raloxifene (EVISTA) 60 MG Tab TAKE 1 TABLET BY MOUTH DAILY 100 Tablet 3    felodipine (PLENDIL) 5 MG TABLET SR 24 HR Take 2 Tablets by mouth every day. 200 Tablet 3    rosuvastatin (CRESTOR) 20 MG Tab TAKE 1 TABLET BY MOUTH IN  THE EVENING 90 Tablet 3    cyclobenzaprine (FLEXERIL) 10 mg Tab Take 10 mg by mouth 3 times a day as needed.      Magnesium 400 MG Cap Take  by mouth.      Misc. Devices Misc Portable oxygen concentrator: use daily. 1 Each 0    albuterol (PROVENTIL) 2.5mg/3ml Nebu Soln solution for nebulization 3 mL by Nebulization route every four hours as needed for Shortness of Breath. 75 mL 11    amoxicillin (AMOXIL) 500 MG Cap TAKE 1 CAPSULE BY MOUTH THREE TIMES DAILY UNTIL GONE (Patient not taking: Reported on 10/11/2022)       No current facility-administered medications for this visit.     She  has a past medical history of Arthritis, Asthma, Cataract, CKD (chronic kidney disease) stage 4, GFR 15-29 ml/min ([facility]) (07/01/2013), COPD, Cough, Dental disorder, Heart burn, History of anemia, Hypercholesteremia, Hypertension, Indigestion, Ovarian neoplasm (08/10/2017), Pneumonia (? 2012), Pulmonary emphysema ([facility]), Shortness of breath, Sputum production, Stroke ([facility]) (2016), Vitamin d deficiency (07/01/2013), and Wheezing.    She has no past medical history of Painful breathing.    ROS  No chest pain, no shortness of breath, no abdominal pain       Objective:     /82 (BP Location: Left arm, Patient Position: Sitting, BP Cuff Size: Adult long)   Pulse 84   Temp 36.4 °C (97.6 °F) (Temporal)   Resp 16   Ht 1.626 m (5' 4\")   Wt 88.9 kg (196 " lb)   SpO2 95%  Body mass index is 33.64 kg/m².   Physical Exam:  Constitutional: Alert, no distress.  Skin: Warm, dry, good turgor, no rashes in visible areas.  Eye: Equal, round and reactive, conjunctiva clear, lids normal.  ENMT: Lips without lesions, good dentition, oropharynx clear.  Neck: Trachea midline, no masses, no thyromegaly. No cervical or supraclavicular lymphadenopathy  Respiratory: Unlabored respiratory effort, lungs clear to auscultation, no wheezes, no ronchi.  Cardiovascular: Normal S1, S2, no murmur, no edema.  Abdomen: Soft, non-tender, no masses, no hepatosplenomegaly.  Psych: Alert and oriented x3, normal affect and mood.        Assessment and Plan:   The following treatment plan was discussed    1. Need for vaccination  - Influenza Vaccine, High Dose (65+ Only)    2. Obesity (BMI 30-39.9)  - Patient identified as having weight management issue.  Appropriate orders and counseling given.    3. Chronic respiratory failure with hypoxia (HCC)  - Misc. Devices Misc; 4 wheeled Walker with brakes, with seat.  Fax to [employer] Fax: [phone]  Tel: [phone]  Dispense: 1 Each; Refill: 0  - CBC WITH DIFFERENTIAL; Future    4. Primary hypertension    5. Stage 3b chronic kidney disease (CKD) (HCC)    6. Hyperparathyroidism due to vitamin D deficiency (HCC)  - VITAMIN D,25 HYDROXY (DEFICIENCY); Future  - PTH INTACT (PTH ONLY); Future    7. Debility  - Misc. Devices Misc; 4 wheeled Walker with brakes, with seat.  Fax to [employer] Fax: [phone]  Tel: [phone]  Dispense: 1 Each; Refill: 0    8. Chronic gout of right foot, unspecified cause  - Misc. Devices Misc; 4 wheeled Walker with brakes, with seat.  Fax to [employer] Fax: [phone]  Tel: [phone]  Dispense: 1 Each; Refill: 0    9. Impaired mobility  - Misc. Devices Misc; 4 wheeled Walker with brakes, with seat.  Fax to [employer] Fax: [phone]  Tel: [phone]  Dispense: 1  Each; Refill: 0    10. At high risk for falls  - Misc. Devices Misc; 4 wheeled Walker with brakes, with seat.  Fax to [employer] Fax: [phone]  Tel: [phone]  Dispense: 1 Each; Refill: 0    11. B12 deficiency  - CBC WITH DIFFERENTIAL; Future  - VITAMIN B12; Future    12. Dyslipidemia  - Comp Metabolic Panel; Future  - Lipid Profile; Future    13. Vitamin D deficiency  - VITAMIN D,25 HYDROXY (DEFICIENCY); Future    Other orders  - amoxicillin (AMOXIL) 500 MG Cap; TAKE 1 CAPSULE BY MOUTH THREE TIMES DAILY UNTIL GONE (Patient not taking: Reported on 10/11/2022)      Followup: Return for As Scheduled Feb.

## 2022-10-11 NOTE — ASSESSMENT & PLAN NOTE
Mild elevated PTH, normal vitamin D  Continues on two vitamin D 2000 u daily   Latest Reference Range & Units Most Recent   25-Hydroxy   Vitamin D 25 30 - 100 ng/mL 34  3/16/22 10:10   Folate -Folic Acid >4.0 ng/mL 7.3  10/14/21 07:40   Vitamin B12 -True Cobalamin 211 - 911 pg/mL <150 (L)  10/14/21 07:40   Pth, Intact 14.0 - 72.0 pg/mL 76.4 (H)  3/16/22 10:10   (L): Data is abnormally low  (H): Data is abnormally high

## 2022-10-11 NOTE — ASSESSMENT & PLAN NOTE
Well controlled 120/82 in clinic today  On losartan 50 mg daily.   She has chronic SOB on oxygen due to COPD which is stable.   She has no CP  Uses compression socks which really help with her chronic LE edema

## 2022-11-04 DIAGNOSIS — R91.8 PULMONARY NODULES: ICD-10-CM

## 2022-11-29 ASSESSMENT — ENCOUNTER SYMPTOMS
PALPITATIONS: 0
HEMOPTYSIS: 0
CHILLS: 0
SHORTNESS OF BREATH: 1
SPUTUM PRODUCTION: 1
MYALGIAS: 0
HEADACHES: 0
HEARTBURN: 1
SINUS PAIN: 1
WHEEZING: 1
DIZZINESS: 0
FEVER: 0
COUGH: 1

## 2022-11-30 ENCOUNTER — RESEARCH ENCOUNTER (OUTPATIENT)
Dept: SLEEP MEDICINE | Facility: MEDICAL CENTER | Age: 72
End: 2022-11-30

## 2022-11-30 ENCOUNTER — NON-PROVIDER VISIT (OUTPATIENT)
Dept: SLEEP MEDICINE | Facility: MEDICAL CENTER | Age: 72
End: 2022-11-30
Attending: INTERNAL MEDICINE
Payer: MEDICARE

## 2022-11-30 ENCOUNTER — OFFICE VISIT (OUTPATIENT)
Dept: SLEEP MEDICINE | Facility: MEDICAL CENTER | Age: 72
End: 2022-11-30
Payer: MEDICARE

## 2022-11-30 VITALS
OXYGEN SATURATION: 91 % | DIASTOLIC BLOOD PRESSURE: 82 MMHG | HEART RATE: 92 BPM | HEIGHT: 64 IN | SYSTOLIC BLOOD PRESSURE: 122 MMHG | BODY MASS INDEX: 32.95 KG/M2 | WEIGHT: 193 LBS

## 2022-11-30 VITALS — HEIGHT: 64 IN | BODY MASS INDEX: 32.95 KG/M2 | WEIGHT: 193 LBS

## 2022-11-30 DIAGNOSIS — J43.2 CENTRILOBULAR EMPHYSEMA (HCC): ICD-10-CM

## 2022-11-30 DIAGNOSIS — J43.8 OTHER EMPHYSEMA (HCC): ICD-10-CM

## 2022-11-30 PROCEDURE — 99215 OFFICE O/P EST HI 40 MIN: CPT | Performed by: INTERNAL MEDICINE

## 2022-11-30 PROCEDURE — 94729 DIFFUSING CAPACITY: CPT | Performed by: INTERNAL MEDICINE

## 2022-11-30 PROCEDURE — 94726 PLETHYSMOGRAPHY LUNG VOLUMES: CPT | Performed by: INTERNAL MEDICINE

## 2022-11-30 PROCEDURE — 94010 BREATHING CAPACITY TEST: CPT | Performed by: INTERNAL MEDICINE

## 2022-11-30 RX ORDER — IPRATROPIUM BROMIDE AND ALBUTEROL 20; 100 UG/1; UG/1
1 SPRAY, METERED RESPIRATORY (INHALATION) EVERY 4 HOURS PRN
Qty: 1 EACH | Refills: 6 | Status: SHIPPED | OUTPATIENT
Start: 2022-11-30

## 2022-11-30 ASSESSMENT — PULMONARY FUNCTION TESTS
FEV1/FVC: 56
FEV1/FVC_PERCENT_PREDICTED: 71
FVC: 1.94
FEV1/FVC_PERCENT_PREDICTED: 77
FEV1/FVC_PERCENT_PREDICTED: 71
FEV1_PERCENT_PREDICTED: 50
FVC_PERCENT_PREDICTED: 70
FEV1_LLN: 1.77
FEV1/FVC_PERCENT_LLN: 65
FEV1/FVC: 56
FEV1_PREDICTED: 2.12
FEV1: 1.08
FVC_PREDICTED: 2.75
FVC_LLN: 2.30
FEV1/FVC_PREDICTED: 78

## 2022-11-30 ASSESSMENT — FIBROSIS 4 INDEX
FIB4 SCORE: 1.74
FIB4 SCORE: 1.74

## 2022-11-30 NOTE — RESEARCH NOTE
Name: Laruen Bowden  MRN: [id number]  Participant ID:    : [id number]  Visit Date/Time: 2022 11:20 AM        Study:           [id number] - [facility] Lung Screening Phase 2   Status Consented/Enrolled   Start Date 2022   Participant ID    Coordinator Angelica Purcell; Kylah Alex; Salvador Dunne; Penny Harris    Rian Crouch M.D.         Screening/Consent note:     Participation in the [facility] clinical trial was discussed with Lauren tripathi. All aspects of the study purpose and procedures were explained. She was given ample time to review the consent and all questions were answered to her satisfaction. Patient aware that the clinical trial is voluntary and she may withdraw consent at any time without affecting the level of care they receive. Subject signed consent without coercion and undue influence and was given a copy of the signed consent. No study-related procedures took place prior to consenting and all assessments were conducted per protocol.     Nasal swab collected.      Adverse Events: None        Follow-up: Provider will follow up with patient based on results.

## 2022-11-30 NOTE — ASSESSMENT & PLAN NOTE
Gold Class unknown due to no PFT COPD.  Plan:  - Maintenance therapy: Switch to Breztri, samples provided, educated in clinic  - Continue Albuterol as needed  - Continue Oxygen therapy  - Routine vaccinations are current - awaiting an appointment for Covid booster  - Educated on COPD exacerbation signs, return precautions and when to contact clinic or come in to ER.   - continue treatment for allergies - recommended triple therapy for PND  - BLVR delayed due to new nodule  - Patient to have follow up CT chest in April per protocol.   - Follow up in 3 months

## 2022-11-30 NOTE — PROCEDURES
Tech: Nelida Reed, RT  Good patient effort & cooperation.  Test was performed on the [employer] Body Plethysmograph- Elite DX system.  The predicted sets used for Spirometry are GLI-2012, for Lung Volumes are ITS, and for DLCO is GLI 2017.  The results of this test meet the ATS standards for acceptability and repeatability.  The DLCO was uncorrected for Hb.  No post FVC, patient took Anoro at 7:00 am.    Interpretation:   Baseline spirometry shows airflow obstruction with FEV1/FVC ratio 56 and an FEV1 of 1.08 L or 50% predicted.  No bronchodilator testing was performed.  Lung volumes are within normal limits.  Diffusion capacity is significantly reduced at 48% predicted.  Pulmonary function testing shows airflow obstruction with reduced DLCO consistent with stated diagnosis of COPD or emphysema.

## 2022-11-30 NOTE — PROGRESS NOTES
Pulmonary Clinic- Follow up    Date of Service: 11/30/22     Referring Physician: Maisha Bay M.D.    Reason for Consult: Emphysema    Chief Complaint:   Chief Complaint   Patient presents with    Follow-Up     Centrilobular emphysema. Last seen 08/25/22    Results     PFT 11/30/22, CT-Chest 10/06/22       HPI:   Lauren Bowden is a 72 y.o. female who is followed in the pulmonary clinic for emphysema, last seen 8/25/22. She was diagnosed with COPD about 15 years ago with PFTs unfortunately unavailable for review.  She has been on spiriva in the past but most recently only on Combivent and nebulizers as needed.  She is on supplemental O2 ATC at 3L and finds the portable concentrator is heavy and cumbersome to use so comes in today on room air saturating 83%.  She used to have 2 exacerbations every year but when she quit smoking 10 years ago that got much better.  Last exacerbation was in May of this year.  She is breathless a lot and cannot walk far without having worsening RIVERO.  She coughs daily and brings up clear sputum.  MMRC 4.      Pulmonary Specific History:  She denies any prior lung problems prior to her history of COPD.  She started smoking when she was 19 years old and quit 10 years ago, approximately 43-pack-year smoking history.  She had her last COPD exacerbation in May of this year.  She does have a history of GERD which she treats with omeprazole.  She has a history of allergies but does not treat them routinely.  She also has a history of postnasal drip which is untreated.  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  She is currently using Combivent multiple times a day and has no maintenance inhalers.    Today: She is feeling the same as before.  We miscommunicated about her inhalers last time and she has been avoiding her combivent because she did not realize she could take it at the same time as her anoro.  She still feels  breathless on the anoro and feels she needs more.  She has a cough productive of clear sputum in the day time and dry at night.      Vaccinations:  2022: Up to date on pneumovax and flu, needs covid booster, plans to get soon.      Past Medical History:   Diagnosis Date    Arthritis     all over, history of gout both feet, great toes    Asthma     daily inhalers    Cataract     CKD (chronic kidney disease) stage 4, GFR 15-29 ml/min ([facility]) 07/01/2013    Did see Dr. Perkins; current GFR 27 6/26/13; Has both kidneys, only one is functioning.    COPD     Cough     Dental disorder     lower partial    Heart burn     History of anemia     Hypercholesteremia     Hypertension     Indigestion     Ovarian neoplasm 08/10/2017    Incidental finding of the CT Renal  - 6.8 x 6.8 x 6 cm cystic left adnexal lesion may represent an ovarian cystadenoma or cystadenocarcinoma.      Pneumonia ? 2012    Pulmonary emphysema (HCC)     Oxygen as needed, through [employer], will be switching to [employer]    Shortness of breath     Sputum production     Stroke ([facility]) 2016    pt states it was a TIA no residual on plavix    Vitamin d deficiency 07/01/2013    Was taking 2000 - still 26; incr to 4000    Wheezing        Past Surgical History:   Procedure Laterality Date    MINI LAPAROTOMY N/A 2/7/2019    Procedure: MINI LAPAROTOMY- EXPLORATORY;  Surgeon: Henrry Centeno M.D.;  Location: SURGERY SAME DAY [facility] ORS;  Service: Gynecology    ABDOMINAL HYSTERECTOMY TOTAL N/A 2/7/2019    Procedure: ABDOMINAL HYSTERECTOMY TOTAL;  Surgeon: Henrry Centeno M.D.;  Location: SURGERY SAME DAY [facility] ORS;  Service: Gynecology    SALPINGO OOPHORECTOMY Bilateral 2/7/2019    Procedure: SALPINGO OOPHORECTOMY;  Surgeon: Henrry Centeno M.D.;  Location: SURGERY SAME DAY [facility] ORS;  Service: Gynecology    CATARACT PHACO WITH IOL Right 1/29/2019    Procedure: CATARACT PHACO WITH IOL;  Surgeon: Xander Hemphill M.D.;  Location: SURGERY SAME DAY [facility] ORS;   Service: Ophthalmology    CATARACT PHACO WITH IOL Left 1/15/2019    Procedure: CATARACT PHACO WITH IOL;  Surgeon: Xander Hemphill M.D.;  Location: SURGERY SAME DAY [facility];  Service: Ophthalmology    APPENDECTOMY      years ago, ?       Social History     Socioeconomic History    Marital status:      Spouse name: Not on file    Number of children: Not on file    Years of education: Not on file    Highest education level: Not on file   Occupational History    Not on file   Tobacco Use    Smoking status: Former     Packs/day: 1.00     Years: 40.00     Pack years: 40.00     Types: Cigarettes     Quit date: 10/1/2010     Years since quittin.1    Smokeless tobacco: Never   Vaping Use    Vaping Use: Never used   Substance and Sexual Activity    Alcohol use: Not Currently     Alcohol/week: 2.4 oz     Types: 2 Cans of beer, 2 Shots of liquor per week     Comment: 2 a day    Drug use: No    Sexual activity: Never     Partners: Male     Comment:    Other Topics Concern    Not on file   Social History Narrative    Not on file     Social Determinants of Health     Financial Resource Strain: Not on file   Food Insecurity: Not on file   Transportation Needs: Not on file   Physical Activity: Not on file   Stress: Not on file   Social Connections: Not on file   Intimate Partner Violence: Not on file   Housing Stability: Not on file          Family History   Problem Relation Age of Onset    Diabetes Sister     Cancer Sister        Current Outpatient Medications on File Prior to Visit   Medication Sig Dispense Refill    Misc. Devices Misc 4 wheeled Walker with brakes, with seat.  Fax to [employer] Fax: [phone]  Tel: [phone] 1 Each 0    pantoprazole (PROTONIX) 20 MG tablet Take 1 Tablet by mouth every day. 90 Tablet 3    losartan (COZAAR) 50 MG Tab TAKE 1 TABLET BY MOUTH  DAILY 90 Tablet 3    clopidogrel (PLAVIX) 75 MG Tab TAKE 1 TABLET BY MOUTH  DAILY 90 Tablet 3    furosemide  "(LASIX) 20 MG Tab TAKE 1 TABLET BY MOUTH  TWICE DAILY 180 Tablet 3    carboxymethylcellulose 1 % Gel 1 Drop as needed.      multivitamin (THERAGRAN) Tab Take 1 Tablet by mouth every day. MVI      allopurinol (ZYLOPRIM) 100 MG Tab TAKE 1 TABLET BY MOUTH  DAILY 90 Tablet 3    buPROPion SR (WELLBUTRIN-SR) 150 MG TABLET SR 12 HR sustained-release tablet TAKE 1 TABLET BY MOUTH  DAILY 90 Tablet 3    raloxifene (EVISTA) 60 MG Tab TAKE 1 TABLET BY MOUTH DAILY 100 Tablet 3    felodipine (PLENDIL) 5 MG TABLET SR 24 HR Take 2 Tablets by mouth every day. 200 Tablet 3    rosuvastatin (CRESTOR) 20 MG Tab TAKE 1 TABLET BY MOUTH IN  THE EVENING 90 Tablet 3    cyclobenzaprine (FLEXERIL) 10 mg Tab Take 10 mg by mouth 3 times a day as needed.      Magnesium 400 MG Cap Take  by mouth.      Misc. Devices Misc Portable oxygen concentrator: use daily. 1 Each 0    albuterol (PROVENTIL) 2.5mg/3ml Nebu Soln solution for nebulization 3 mL by Nebulization route every four hours as needed for Shortness of Breath. 75 mL 11    Misc. Devices Misc Power scooter power mobility device. 1 Each 0     No current facility-administered medications on file prior to visit.       Allergies: Patient has no known allergies.      ROS:   Review of Systems   Constitutional:  Negative for chills and fever.   HENT:  Positive for congestion and sinus pain.    Respiratory:  Positive for cough, sputum production, shortness of breath and wheezing. Negative for hemoptysis.    Cardiovascular:  Negative for chest pain, palpitations and leg swelling.   Gastrointestinal:  Positive for heartburn.   Genitourinary:  Negative for dysuria.   Musculoskeletal:  Negative for myalgias.   Neurological:  Negative for dizziness and headaches.   Endo/Heme/Allergies:  Positive for environmental allergies.     Vitals:  /82 (BP Location: Right arm, Patient Position: Sitting, BP Cuff Size: Adult)   Pulse 92   Ht 1.626 m (5' 4\")   Wt 87.5 kg (193 lb)   SpO2 91%     Physical " Exam:  Physical Exam  Constitutional:       Appearance: Normal appearance.   HENT:      Head: Normocephalic and atraumatic.      Mouth/Throat:      Mouth: Mucous membranes are moist.   Eyes:      Extraocular Movements: Extraocular movements intact.   Cardiovascular:      Rate and Rhythm: Normal rate and regular rhythm.      Heart sounds: Normal heart sounds.   Pulmonary:      Effort: Pulmonary effort is normal. No respiratory distress.      Breath sounds: No wheezing or rales.      Comments: Diminished breath sounds throughout  Abdominal:      Palpations: Abdomen is soft.   Musculoskeletal:         General: No swelling, tenderness or deformity.   Skin:     General: Skin is warm and dry.   Neurological:      General: No focal deficit present.      Mental Status: She is alert and oriented to person, place, and time.       Laboratory Data:      Pertinent Studies:    PFTs as reviewed by me personally show: none    Imaging as reviewed by me personally show:  none  CT Thorax: 10/6/22  IMPRESSION:     1.  Bilateral pulmonary nodules measuring up to 7 mm in size.     2.   Severe emphysematous change of the lungs.     3.  Multiple areas of peripheral scarring and peripheral interstitial septal thickening.     4.  Atherosclerotic vascular calcification.     5.  Bilateral renal cortical thinning and atrophy.    Echo: none      Assessment/Plan:    Problem List Items Addressed This Visit       Other emphysema (HCC)     Gold Class unknown due to no PFT COPD.  Plan:  - Maintenance therapy: Switch to Breztri, samples provided, educated in clinic  - Continue Albuterol as needed  - Continue Oxygen therapy  - Routine vaccinations are current - awaiting an appointment for Covid booster  - Educated on COPD exacerbation signs, return precautions and when to contact clinic or come in to ER.   - continue treatment for allergies - recommended triple therapy for PND  - BLVR delayed due to new nodule  - Patient to have follow up CT chest in  April per protocol.   - Follow up in 3 months         Relevant Medications    Budeson-Glycopyrrol-Formoterol (BREZTRI AEROSPHERE) 160-9-4.8 MCG/ACT Aerosol    ipratropium-albuterol (COMBIVENT RESPIMAT)  MCG/ACT Aero Soln    Budeson-Glycopyrrol-Formoterol (BREZTRI AEROSPHERE) 160-9-4.8 MCG/ACT Aerosol     Other Visit Diagnoses       Centrilobular emphysema (HCC)        Relevant Medications    Budeson-Glycopyrrol-Formoterol (BREZTRI AEROSPHERE) 160-9-4.8 MCG/ACT Aerosol    ipratropium-albuterol (COMBIVENT RESPIMAT)  MCG/ACT Aero Soln    Budeson-Glycopyrrol-Formoterol (BREZTRI AEROSPHERE) 160-9-4.8 MCG/ACT Aerosol             Return in about 5 months (around 4/30/2023).     This note was generated using voice recognition software which has a chance of producing errors of grammar and possibly content.  I have made every reasonable attempt to find and correct any obvious errors, but it should be expected that some may not be found prior to finalization of this note.    Time spent in record review prior to patient arrival, reviewing results, and in face-to-face encounter totaled 45 min, excluding any procedures if performed.      Alis Travis MD RD  Pulmonary and Critical Care Medicine  [facility]

## 2022-12-02 ENCOUNTER — TELEPHONE (OUTPATIENT)
Dept: SLEEP MEDICINE | Facility: MEDICAL CENTER | Age: 72
End: 2022-12-02
Payer: MEDICARE

## 2022-12-02 DIAGNOSIS — J43.2 CENTRILOBULAR EMPHYSEMA (HCC): ICD-10-CM

## 2022-12-02 NOTE — TELEPHONE ENCOUNTER
Please advise. Thanks     VOICEMAIL  1. Caller Name: Lauren                       Call Back Number: [phone]    2. Message: [facility] is requesting a 90 day supply for Budeson-Glycopyrrol-Formoterol (BREZTRI AEROSPHERE)          3. Patient approves office to leave a detailed voicemail/MyChart message: N\A

## 2022-12-09 DIAGNOSIS — I25.10 CORONARY ARTERY DISEASE INVOLVING NATIVE CORONARY ARTERY OF NATIVE HEART WITHOUT ANGINA PECTORIS: ICD-10-CM

## 2022-12-09 NOTE — TELEPHONE ENCOUNTER
Received request via: Pharmacy    Was the patient seen in the last year in this department? Yes    Does the patient have an active prescription (recently filled or refills available) for medication(s) requested? No    Does the patient have [facility] and need 100 day supply (blood pressure, diabetes and cholesterol meds only)? Patient does not have SCP    Last OV 10/11/2022  Next OV 02/16/2023

## 2022-12-13 RX ORDER — ROSUVASTATIN CALCIUM 20 MG/1
TABLET, COATED ORAL
Qty: 90 TABLET | Refills: 3 | Status: SHIPPED | OUTPATIENT
Start: 2022-12-13 | End: 2023-10-18

## 2022-12-13 RX ORDER — BUPROPION HYDROCHLORIDE 150 MG/1
TABLET, EXTENDED RELEASE ORAL
Qty: 90 TABLET | Refills: 3 | Status: SHIPPED | OUTPATIENT
Start: 2022-12-13 | End: 2023-10-18

## 2023-01-03 ENCOUNTER — TELEPHONE (OUTPATIENT)
Dept: MEDICAL GROUP | Facility: PHYSICIAN GROUP | Age: 73
End: 2023-01-03
Payer: MEDICARE

## 2023-01-03 ENCOUNTER — TELEPHONE (OUTPATIENT)
Dept: URGENT CARE | Facility: PHYSICIAN GROUP | Age: 73
End: 2023-01-03
Payer: MEDICARE

## 2023-01-12 DIAGNOSIS — M81.0 OSTEOPOROSIS, UNSPECIFIED OSTEOPOROSIS TYPE, UNSPECIFIED PATHOLOGICAL FRACTURE PRESENCE: ICD-10-CM

## 2023-01-16 NOTE — TELEPHONE ENCOUNTER
Received request via: Pharmacy    Was the patient seen in the last year in this department? Yes    Does the patient have an active prescription (recently filled or refills available) for medication(s) requested? No    Does the patient have [facility] and need 100 day supply (blood pressure, diabetes and cholesterol meds only)? Patient does not have SCP    Last Office visit:10/11/2022  Last Labs:09/26/2022

## 2023-01-17 RX ORDER — RALOXIFENE HYDROCHLORIDE 60 MG/1
TABLET, FILM COATED ORAL
Qty: 100 TABLET | Refills: 3 | Status: SHIPPED | OUTPATIENT
Start: 2023-01-17

## 2023-02-07 ENCOUNTER — HOSPITAL ENCOUNTER (OUTPATIENT)
Facility: MEDICAL CENTER | Age: 73
End: 2023-02-07
Attending: FAMILY MEDICINE
Payer: MEDICARE

## 2023-02-07 DIAGNOSIS — E78.5 DYSLIPIDEMIA: ICD-10-CM

## 2023-02-07 DIAGNOSIS — J96.11 CHRONIC RESPIRATORY FAILURE WITH HYPOXIA (HCC): ICD-10-CM

## 2023-02-07 DIAGNOSIS — E55.9 VITAMIN D DEFICIENCY: ICD-10-CM

## 2023-02-07 DIAGNOSIS — E53.8 B12 DEFICIENCY: ICD-10-CM

## 2023-02-07 DIAGNOSIS — E21.1 HYPERPARATHYROIDISM DUE TO VITAMIN D DEFICIENCY (HCC): ICD-10-CM

## 2023-02-07 LAB
BASOPHILS # BLD AUTO: 1.1 % (ref 0–1.8)
BASOPHILS # BLD: 0.07 K/UL (ref 0–0.12)
EOSINOPHIL # BLD AUTO: 0.13 K/UL (ref 0–0.51)
EOSINOPHIL NFR BLD: 2 % (ref 0–6.9)
ERYTHROCYTE [DISTWIDTH] IN BLOOD BY AUTOMATED COUNT: 46.2 FL (ref 35.9–50)
HCT VFR BLD AUTO: 40.8 % (ref 37–47)
HGB BLD-MCNC: 13.2 G/DL (ref 12–16)
IMM GRANULOCYTES # BLD AUTO: 0.03 K/UL (ref 0–0.11)
IMM GRANULOCYTES NFR BLD AUTO: 0.5 % (ref 0–0.9)
LYMPHOCYTES # BLD AUTO: 1.76 K/UL (ref 1–4.8)
LYMPHOCYTES NFR BLD: 27.6 % (ref 22–41)
MCH RBC QN AUTO: 31 PG (ref 27–33)
MCHC RBC AUTO-ENTMCNC: 32.4 G/DL (ref 33.6–35)
MCV RBC AUTO: 95.8 FL (ref 81.4–97.8)
MONOCYTES # BLD AUTO: 0.45 K/UL (ref 0–0.85)
MONOCYTES NFR BLD AUTO: 7.1 % (ref 0–13.4)
NEUTROPHILS # BLD AUTO: 3.93 K/UL (ref 2–7.15)
NEUTROPHILS NFR BLD: 61.7 % (ref 44–72)
NRBC # BLD AUTO: 0 K/UL
NRBC BLD-RTO: 0 /100 WBC
PLATELET # BLD AUTO: 187 K/UL (ref 164–446)
PMV BLD AUTO: 10.4 FL (ref 9–12.9)
RBC # BLD AUTO: 4.26 M/UL (ref 4.2–5.4)
WBC # BLD AUTO: 6.4 K/UL (ref 4.8–10.8)

## 2023-02-07 PROCEDURE — 80061 LIPID PANEL: CPT

## 2023-02-07 PROCEDURE — 80053 COMPREHEN METABOLIC PANEL: CPT

## 2023-02-07 PROCEDURE — 82607 VITAMIN B-12: CPT

## 2023-02-07 PROCEDURE — 83970 ASSAY OF PARATHORMONE: CPT

## 2023-02-07 PROCEDURE — 82306 VITAMIN D 25 HYDROXY: CPT

## 2023-02-07 PROCEDURE — 85025 COMPLETE CBC W/AUTO DIFF WBC: CPT

## 2023-02-08 LAB
25(OH)D3 SERPL-MCNC: 107 NG/ML (ref 30–100)
ALBUMIN SERPL BCP-MCNC: 4.1 G/DL (ref 3.2–4.9)
ALBUMIN/GLOB SERPL: 1.5 G/DL
ALP SERPL-CCNC: 70 U/L (ref 30–99)
ALT SERPL-CCNC: 17 U/L (ref 2–50)
ANION GAP SERPL CALC-SCNC: 9 MMOL/L (ref 7–16)
AST SERPL-CCNC: 18 U/L (ref 12–45)
BILIRUB SERPL-MCNC: 0.3 MG/DL (ref 0.1–1.5)
BUN SERPL-MCNC: 28 MG/DL (ref 8–22)
CALCIUM ALBUM COR SERPL-MCNC: 9.3 MG/DL (ref 8.5–10.5)
CALCIUM SERPL-MCNC: 9.4 MG/DL (ref 8.5–10.5)
CHLORIDE SERPL-SCNC: 102 MMOL/L (ref 96–112)
CHOLEST SERPL-MCNC: 185 MG/DL (ref 100–199)
CO2 SERPL-SCNC: 26 MMOL/L (ref 20–33)
CREAT SERPL-MCNC: 1.67 MG/DL (ref 0.5–1.4)
GFR SERPLBLD CREATININE-BSD FMLA CKD-EPI: 32 ML/MIN/1.73 M 2
GLOBULIN SER CALC-MCNC: 2.7 G/DL (ref 1.9–3.5)
GLUCOSE SERPL-MCNC: 93 MG/DL (ref 65–99)
HDLC SERPL-MCNC: 59 MG/DL
LDLC SERPL CALC-MCNC: 82 MG/DL
POTASSIUM SERPL-SCNC: 4.6 MMOL/L (ref 3.6–5.5)
PROT SERPL-MCNC: 6.8 G/DL (ref 6–8.2)
PTH-INTACT SERPL-MCNC: 88.2 PG/ML (ref 14–72)
SODIUM SERPL-SCNC: 137 MMOL/L (ref 135–145)
TRIGL SERPL-MCNC: 222 MG/DL (ref 0–149)
VIT B12 SERPL-MCNC: 453 PG/ML (ref 211–911)

## 2023-02-16 ENCOUNTER — OFFICE VISIT (OUTPATIENT)
Dept: MEDICAL GROUP | Facility: PHYSICIAN GROUP | Age: 73
End: 2023-02-16
Payer: MEDICARE

## 2023-02-16 VITALS
DIASTOLIC BLOOD PRESSURE: 68 MMHG | WEIGHT: 199 LBS | SYSTOLIC BLOOD PRESSURE: 122 MMHG | HEIGHT: 64 IN | BODY MASS INDEX: 33.97 KG/M2 | TEMPERATURE: 97.2 F | HEART RATE: 83 BPM | OXYGEN SATURATION: 95 % | RESPIRATION RATE: 14 BRPM

## 2023-02-16 DIAGNOSIS — E83.42 HYPOMAGNESEMIA: ICD-10-CM

## 2023-02-16 DIAGNOSIS — E21.1 HYPERPARATHYROIDISM DUE TO VITAMIN D DEFICIENCY (HCC): ICD-10-CM

## 2023-02-16 DIAGNOSIS — N18.32 STAGE 3B CHRONIC KIDNEY DISEASE (CKD): ICD-10-CM

## 2023-02-16 DIAGNOSIS — J96.11 CHRONIC RESPIRATORY FAILURE WITH HYPOXIA (HCC): ICD-10-CM

## 2023-02-16 DIAGNOSIS — J43.8 OTHER EMPHYSEMA (HCC): ICD-10-CM

## 2023-02-16 DIAGNOSIS — G45.9 TIA (TRANSIENT ISCHEMIC ATTACK): ICD-10-CM

## 2023-02-16 DIAGNOSIS — E55.9 VITAMIN D DEFICIENCY: ICD-10-CM

## 2023-02-16 PROCEDURE — 99214 OFFICE O/P EST MOD 30 MIN: CPT | Performed by: FAMILY MEDICINE

## 2023-02-16 ASSESSMENT — FIBROSIS 4 INDEX: FIB4 SCORE: 1.73

## 2023-02-16 ASSESSMENT — PATIENT HEALTH QUESTIONNAIRE - PHQ9: CLINICAL INTERPRETATION OF PHQ2 SCORE: 0

## 2023-02-16 NOTE — ASSESSMENT & PLAN NOTE
Chronic condition, stable  Continue hydration, losartan  Repeat labs ordered.    Latest Reference Range & Units 09/26/22 08:45 02/07/23 07:17   Bun 8 - 22 mg/dL 24 (H) 28 (H)   Creatinine 0.50 - 1.40 mg/dL 1.68 (H) 1.67 (H)   GFR (CKD-EPI) >60 mL/min/1.73 m 2 32 ! 32 !   (H): Data is abnormally high  !: Data is abnormal

## 2023-02-16 NOTE — PROGRESS NOTES
Subjective:   Lauren Bowden is a 73 y.o. female here today for evaluation and management of:     Stage 3b chronic kidney disease (CKD) (HCC)  Chronic condition, stable  Continue hydration, losartan  Repeat labs ordered.    Latest Reference Range & Units 09/26/22 08:45 02/07/23 07:17   Bun 8 - 22 mg/dL 24 (H) 28 (H)   Creatinine 0.50 - 1.40 mg/dL 1.68 (H) 1.67 (H)   GFR (CKD-EPI) >60 mL/min/1.73 m 2 32 ! 32 !   (H): Data is abnormally high  !: Data is abnormal    Other emphysema (HCC)  Chronic condition, stable.   Inhaler combivent and breztri  Followed by pulmonology    Chronic respiratory failure with hypoxia (HCC)  2 to 2.5 L continuous oxygen supplementation due to copd, stable.      HCC Gap Form    Diagnosis to address: J43.8 - Other emphysema (HCC)  Assessment and plan: Chronic, stable. Continue with current defined treatment plan: continue breztri, albuterol neb as neededFollow-up at least annually.  Diagnosis: N18.32 - Stage 3b chronic kidney disease (CKD) (HCC)  Assessment and plan: Chronic, stable. Continue with current defined treatment plan: continue hydration, HTN control, arb, labs every 6-12 monthsFollow-up at least annually.  Diagnosis: E21.1 - Hyperparathyroidism due to vitamin D deficiency (HCC)  Assessment and plan: Chronic, stable. Continue with current defined treatment plan: dietary calcium intake, vitamin d supplement, labs every 6-12 months. Follow-up at least annually.  Diagnosis: J96.11 - Chronic respiratory failure with hypoxia (HCC)  Assessment and plan: Chronic, stable. Continue with current defined treatment plan: continue oxygen supplementation via nasal canula continuous 2-2.5 L nocturnal oxygen supplementation. Follow-up at least annually.  Last edited 02/16/23 12:28 PST by Maisha Bay M.D.             Current medicines (including changes today)  Current Outpatient Medications   Medication Sig Dispense Refill    raloxifene (EVISTA) 60 MG Tab TAKE 1 TABLET BY MOUTH  DAILY 100  Tablet 3    rosuvastatin (CRESTOR) 20 MG Tab TAKE 1 TABLET BY MOUTH IN  THE EVENING 90 Tablet 3    buPROPion SR (WELLBUTRIN-SR) 150 MG TABLET SR 12 HR sustained-release tablet TAKE 1 TABLET BY MOUTH  DAILY 90 Tablet 3    Budeson-Glycopyrrol-Formoterol (BREZTRI AEROSPHERE) 160-9-4.8 MCG/ACT Aerosol Inhale 2 Puffs 2 times a day. 32.1 g 6    ipratropium-albuterol (COMBIVENT RESPIMAT)  MCG/ACT Aero Soln Inhale 1 Puff every four hours as needed (shortness of breath/wheezing.). Maximum 6 puffs every 24 hours. 1 Each 6    Misc. Devices Misc 4 wheeled Walker with brakes, with seat.  Fax to [facility] Fax: [phone]  Tel: [phone] 1 Each 0    pantoprazole (PROTONIX) 20 MG tablet Take 1 Tablet by mouth every day. 90 Tablet 3    losartan (COZAAR) 50 MG Tab TAKE 1 TABLET BY MOUTH  DAILY 90 Tablet 3    clopidogrel (PLAVIX) 75 MG Tab TAKE 1 TABLET BY MOUTH  DAILY 90 Tablet 3    furosemide (LASIX) 20 MG Tab TAKE 1 TABLET BY MOUTH  TWICE DAILY 180 Tablet 3    carboxymethylcellulose 1 % Gel 1 Drop as needed.      multivitamin (THERAGRAN) Tab Take 1 Tablet by mouth every day. MVI      allopurinol (ZYLOPRIM) 100 MG Tab TAKE 1 TABLET BY MOUTH  DAILY 90 Tablet 3    felodipine (PLENDIL) 5 MG TABLET SR 24 HR Take 2 Tablets by mouth every day. 200 Tablet 3    cyclobenzaprine (FLEXERIL) 10 mg Tab Take 10 mg by mouth 3 times a day as needed.      Magnesium 400 MG Cap Take  by mouth.      Misc. Devices Misc Portable oxygen concentrator: use daily. 1 Each 0    albuterol (PROVENTIL) 2.5mg/3ml Nebu Soln solution for nebulization 3 mL by Nebulization route every four hours as needed for Shortness of Breath. 75 mL 11     No current facility-administered medications for this visit.     She  has a past medical history of Arthritis, Asthma, Cataract, CKD (chronic kidney disease) stage 4, GFR 15-29 ml/min ([facility]) (07/01/2013), COPD, Cough, Dental disorder, Heart burn, History of anemia, Hypercholesteremia, Hypertension,  "Indigestion, Ovarian neoplasm (08/10/2017), Pneumonia (? 2012), Pulmonary emphysema (HCC), Shortness of breath, Sputum production, Stroke (HCC) (2016), Vitamin d deficiency (07/01/2013), and Wheezing.    She has no past medical history of Painful breathing.    ROS  No chest pain, no shortness of breath, no abdominal pain       Objective:     /68 (BP Location: Left arm, Patient Position: Sitting, BP Cuff Size: Adult)   Pulse 83   Temp 36.2 °C (97.2 °F) (Temporal)   Resp 14   Ht 1.626 m (5' 4\")   Wt 90.3 kg (199 lb)   SpO2 95%  Body mass index is 34.16 kg/m².   Physical Exam:  Constitutional: Alert, no distress.  Skin: Warm, dry, good turgor, no rashes in visible areas.  Eye: Equal, round and reactive, conjunctiva clear, lids normal.  ENMT: Lips without lesions, good dentition, oropharynx clear.  Neck: Trachea midline, no masses, no thyromegaly. No cervical or supraclavicular lymphadenopathy  Respiratory: Unlabored respiratory effort, lungs clear to auscultation, no wheezes, no ronchi.  Cardiovascular: Normal S1, S2, no murmur, no edema.  Abdomen: Soft, non-tender, no masses, no hepatosplenomegaly.  Psych: Alert and oriented x3, normal affect and mood.        Assessment and Plan:   The following treatment plan was discussed    1. Other emphysema (HCC)    2. Stage 3b chronic kidney disease (CKD) (HCC)  - CBC WITH DIFFERENTIAL; Future  - Comp Metabolic Panel; Future    3. Hyperparathyroidism due to vitamin D deficiency (HCC)  - PTH INTACT (PTH ONLY); Future    4. Chronic respiratory failure with hypoxia (HCC)    5. Vitamin D deficiency  - VITAMIN D,25 HYDROXY (DEFICIENCY); Future  - PTH INTACT (PTH ONLY); Future      Followup: Return in about 6 months (around 8/16/2023) for Lab Review, AWV.           "

## 2023-02-16 NOTE — PATIENT INSTRUCTIONS
Please get fasting labs, fasting for 8-10 hours before next visit. You can make an appointment for the lab or walk in.   Lab hours [facility] location: Monday to Friday 6 am - 4 pm, Saturday 7 am -noon  Even if you lose your lab paperwork, you can still come in to get your lab done.

## 2023-02-16 NOTE — ASSESSMENT & PLAN NOTE
Takes two of the 5000 vit d  Advised her to take one tab, as her vitamin d and PTH are slightly elevated  Normal calcium level.

## 2023-02-16 NOTE — ASSESSMENT & PLAN NOTE
Continue on plavix due to hx of TIA  Cannot take asa due to GI upset  Continues on protonix due to gastritis, and gerd

## 2023-02-24 DIAGNOSIS — M1A.0710 CHRONIC GOUT OF RIGHT FOOT, UNSPECIFIED CAUSE: ICD-10-CM

## 2023-02-24 DIAGNOSIS — I15.9 SECONDARY HYPERTENSION: ICD-10-CM

## 2023-02-24 DIAGNOSIS — I10 ESSENTIAL HYPERTENSION: ICD-10-CM

## 2023-02-24 RX ORDER — ALLOPURINOL 100 MG/1
TABLET ORAL
Qty: 90 TABLET | Refills: 3 | Status: SHIPPED | OUTPATIENT
Start: 2023-02-24

## 2023-02-24 RX ORDER — CLOPIDOGREL BISULFATE 75 MG/1
TABLET ORAL
Qty: 90 TABLET | Refills: 3 | Status: SHIPPED | OUTPATIENT
Start: 2023-02-24 | End: 2024-01-10

## 2023-02-24 RX ORDER — FUROSEMIDE 20 MG/1
TABLET ORAL
Qty: 180 TABLET | Refills: 3 | Status: SHIPPED | OUTPATIENT
Start: 2023-02-24 | End: 2024-01-10

## 2023-02-24 NOTE — TELEPHONE ENCOUNTER
Received request via: Pharmacy    Was the patient seen in the last year in this department? Yes    Does the patient have an active prescription (recently filled or refills available) for medication(s) requested? No    Does the patient have [facility] Plus and need 100 day supply (blood pressure, diabetes and cholesterol meds only)? Patient does not have SCP      Last Office visit:02/16/2023  Last Labs:02/07/2023

## 2023-04-05 RX ORDER — LOSARTAN POTASSIUM 50 MG/1
TABLET ORAL
Qty: 90 TABLET | Refills: 3 | Status: SHIPPED | OUTPATIENT
Start: 2023-04-05

## 2023-04-05 NOTE — TELEPHONE ENCOUNTER
Received request via: Pharmacy    Was the patient seen in the last year in this department? Yes    Does the patient have an active prescription (recently filled or refills available) for medication(s) requested? No    Does the patient have [facility] Plus and need 100 day supply (blood pressure, diabetes and cholesterol meds only)? Patient does not have SCP    Last office Visit:02/16/2023  Last Labs:02/07/2023

## 2023-04-06 ENCOUNTER — TELEPHONE (OUTPATIENT)
Dept: HEALTH INFORMATION MANAGEMENT | Facility: OTHER | Age: 73
End: 2023-04-06
Payer: MEDICARE

## 2023-05-17 ENCOUNTER — HOSPITAL ENCOUNTER (OUTPATIENT)
Dept: RADIOLOGY | Facility: MEDICAL CENTER | Age: 73
End: 2023-05-17
Attending: INTERNAL MEDICINE
Payer: MEDICARE

## 2023-05-17 DIAGNOSIS — R91.8 PULMONARY NODULES: ICD-10-CM

## 2023-05-17 PROCEDURE — 71250 CT THORAX DX C-: CPT

## 2023-08-24 RX ORDER — PANTOPRAZOLE SODIUM 20 MG/1
20 TABLET, DELAYED RELEASE ORAL DAILY
Qty: 100 TABLET | Refills: 3 | Status: SHIPPED | OUTPATIENT
Start: 2023-08-24

## 2023-08-24 NOTE — TELEPHONE ENCOUNTER
Requested Prescriptions     Pending Prescriptions Disp Refills    pantoprazole (PROTONIX) 20 MG tablet [Pharmacy Med Name: Pantoprazole Sodium 20 MG Oral Tablet Delayed Release] 90 Tablet 3     Sig: TAKE 1 TABLET BY MOUTH  DAILY      Last office visit: 2/16/23  Last labs:2/7/23

## 2023-10-17 DIAGNOSIS — I25.10 CORONARY ARTERY DISEASE INVOLVING NATIVE CORONARY ARTERY OF NATIVE HEART WITHOUT ANGINA PECTORIS: ICD-10-CM

## 2023-10-17 NOTE — TELEPHONE ENCOUNTER
Requested Prescriptions     Pending Prescriptions Disp Refills    rosuvastatin (CRESTOR) 20 MG Tab [Pharmacy Med Name: Rosuvastatin Calcium 20 MG Oral Tablet] 90 Tablet 3     Sig: TAKE 1 TABLET BY MOUTH IN THE  EVENING    buPROPion SR (WELLBUTRIN-SR) 150 MG TABLET SR 12 HR sustained-release tablet [Pharmacy Med Name: buPROPion HCl ER (SR) 150 MG Oral Tablet Extended Release 12 Hour] 90 Tablet 3     Sig: TAKE 1 TABLET BY MOUTH DAILY      Last office visit: 2/16/23  Last lab: 2/7/23

## 2023-10-18 RX ORDER — BUPROPION HYDROCHLORIDE 150 MG/1
TABLET, EXTENDED RELEASE ORAL
Qty: 90 TABLET | Refills: 3 | Status: SHIPPED | OUTPATIENT
Start: 2023-10-18

## 2023-10-18 RX ORDER — ROSUVASTATIN CALCIUM 20 MG/1
TABLET, COATED ORAL
Qty: 90 TABLET | Refills: 3 | Status: SHIPPED | OUTPATIENT
Start: 2023-10-18

## 2023-10-24 ENCOUNTER — HOSPITAL ENCOUNTER (OUTPATIENT)
Facility: MEDICAL CENTER | Age: 73
End: 2023-10-24
Attending: INTERNAL MEDICINE
Payer: MEDICARE

## 2023-10-24 LAB
ALBUMIN SERPL BCP-MCNC: 4.2 G/DL (ref 3.2–4.9)
ALBUMIN/GLOB SERPL: 1.5 G/DL
ALP SERPL-CCNC: 70 U/L (ref 30–99)
ALT SERPL-CCNC: 15 U/L (ref 2–50)
ANION GAP SERPL CALC-SCNC: 11 MMOL/L (ref 7–16)
AST SERPL-CCNC: 24 U/L (ref 12–45)
BASOPHILS # BLD AUTO: 1.1 % (ref 0–1.8)
BASOPHILS # BLD: 0.08 K/UL (ref 0–0.12)
BILIRUB SERPL-MCNC: 0.3 MG/DL (ref 0.1–1.5)
BUN SERPL-MCNC: 21 MG/DL (ref 8–22)
CALCIUM ALBUM COR SERPL-MCNC: 9.2 MG/DL (ref 8.5–10.5)
CALCIUM SERPL-MCNC: 9.4 MG/DL (ref 8.5–10.5)
CHLORIDE SERPL-SCNC: 104 MMOL/L (ref 96–112)
CHOLEST SERPL-MCNC: 168 MG/DL (ref 100–199)
CO2 SERPL-SCNC: 25 MMOL/L (ref 20–33)
CREAT SERPL-MCNC: 1.65 MG/DL (ref 0.5–1.4)
EOSINOPHIL # BLD AUTO: 0.17 K/UL (ref 0–0.51)
EOSINOPHIL NFR BLD: 2.4 % (ref 0–6.9)
ERYTHROCYTE [DISTWIDTH] IN BLOOD BY AUTOMATED COUNT: 46 FL (ref 35.9–50)
FASTING STATUS PATIENT QL REPORTED: NORMAL
GFR SERPLBLD CREATININE-BSD FMLA CKD-EPI: 32 ML/MIN/1.73 M 2
GLOBULIN SER CALC-MCNC: 2.8 G/DL (ref 1.9–3.5)
GLUCOSE SERPL-MCNC: 95 MG/DL (ref 65–99)
HCT VFR BLD AUTO: 40.2 % (ref 37–47)
HDLC SERPL-MCNC: 49 MG/DL
HGB BLD-MCNC: 13.2 G/DL (ref 12–16)
IMM GRANULOCYTES # BLD AUTO: 0.06 K/UL (ref 0–0.11)
IMM GRANULOCYTES NFR BLD AUTO: 0.8 % (ref 0–0.9)
LDLC SERPL CALC-MCNC: 63 MG/DL
LYMPHOCYTES # BLD AUTO: 2.44 K/UL (ref 1–4.8)
LYMPHOCYTES NFR BLD: 34.3 % (ref 22–41)
MCH RBC QN AUTO: 32.4 PG (ref 27–33)
MCHC RBC AUTO-ENTMCNC: 32.8 G/DL (ref 32.2–35.5)
MCV RBC AUTO: 98.5 FL (ref 81.4–97.8)
MONOCYTES # BLD AUTO: 0.42 K/UL (ref 0–0.85)
MONOCYTES NFR BLD AUTO: 5.9 % (ref 0–13.4)
NEUTROPHILS # BLD AUTO: 3.94 K/UL (ref 1.82–7.42)
NEUTROPHILS NFR BLD: 55.5 % (ref 44–72)
NRBC # BLD AUTO: 0.02 K/UL
NRBC BLD-RTO: 0.3 /100 WBC (ref 0–0.2)
PHOSPHATE SERPL-MCNC: 3.2 MG/DL (ref 2.5–4.5)
PLATELET # BLD AUTO: 193 K/UL (ref 164–446)
PMV BLD AUTO: 11.6 FL (ref 9–12.9)
POTASSIUM SERPL-SCNC: 4.4 MMOL/L (ref 3.6–5.5)
PROT SERPL-MCNC: 7 G/DL (ref 6–8.2)
RBC # BLD AUTO: 4.08 M/UL (ref 4.2–5.4)
SODIUM SERPL-SCNC: 140 MMOL/L (ref 135–145)
TRIGL SERPL-MCNC: 282 MG/DL (ref 0–149)
WBC # BLD AUTO: 7.1 K/UL (ref 4.8–10.8)

## 2023-10-24 PROCEDURE — 85025 COMPLETE CBC W/AUTO DIFF WBC: CPT

## 2023-10-24 PROCEDURE — 84100 ASSAY OF PHOSPHORUS: CPT

## 2023-10-24 PROCEDURE — 80061 LIPID PANEL: CPT

## 2023-10-24 PROCEDURE — 80053 COMPREHEN METABOLIC PANEL: CPT

## 2024-01-01 NOTE — PROGRESS NOTES
Subjective:   Lauren Bowden is a 71 y.o. female here today for evaluation and management of:     Chronic gout of right foot  Patient's last gout attack was about a year ago she would get gout attacks on her right foot base of the great toe.  These have been very painful and debilitating for her.  She does request that she would like to restart the allopurinol 100 mg as she would like to prevent a gout attack.  I reviewed renal function with her GFR is in the 30s.  We discussed the option of starting a low-dose allopurinol at 100 mg recheck the renal function in 2 months and stop allopurinol if there is a decline in renal function continue and monitor if not         Current medicines (including changes today)  Current Outpatient Medications   Medication Sig Dispense Refill   • allopurinol (ZYLOPRIM) 100 MG Tab Take 1 Tablet by mouth every day. 90 Tablet 1   • felodipine (PLENDIL) 5 MG TABLET SR 24 HR Take 2 Tablets by mouth every day. 200 Tablet 3   • rosuvastatin (CRESTOR) 20 MG Tab TAKE 1 TABLET BY MOUTH IN  THE EVENING 90 Tablet 3   • omeprazole (PRILOSEC) 20 MG delayed-release capsule TAKE 1 CAPSULE BY MOUTH  DAILY 90 Capsule 1   • buPROPion SR (WELLBUTRIN-SR) 150 MG TABLET SR 12 HR sustained-release tablet TAKE 1 TABLET BY MOUTH  DAILY 90 tablet 1   • cyclobenzaprine (FLEXERIL) 10 mg Tab Take 10 mg by mouth 3 times a day as needed.     • clopidogrel (PLAVIX) 75 MG Tab Take 1 tablet by mouth every day. 90 tablet 3   • losartan (COZAAR) 50 MG Tab Take 1 tablet by mouth every day. 90 tablet 3   • methylPREDNISolone (MEDROL DOSEPAK) 4 MG Tablet Therapy Pack As directed on the packaging label. 21 tablet 0   • furosemide (LASIX) 20 MG Tab TAKE 1 TABLET BY MOUTH  TWICE DAILY 180 tablet 3   • COMBIVENT RESPIMAT  MCG/ACT Aero Soln USE 1 INHALATION BY MOUTH 4 TIMES DAILY 12 g 2   • Magnesium 400 MG Cap Take  by mouth.     • Misc. Devices Misc Portable oxygen concentrator: use daily. 1 Each 0   • albuterol  "(PROVENTIL) 2.5mg/3ml Nebu Soln solution for nebulization 3 mL by Nebulization route every four hours as needed for Shortness of Breath. 75 mL 11   • raloxifene (EVISTA) 60 MG Tab TAKE 1 TABLET BY MOUTH DAILY 100 Tablet 3     No current facility-administered medications for this visit.     She  has a past medical history of Arthritis, Asthma, Cataract, CKD (chronic kidney disease) stage 4, GFR 15-29 ml/min ([facility]) (7/1/2013), COPD, Dental disorder, Heart burn, History of anemia, Hypercholesteremia, Hypertension, Indigestion, Ovarian neoplasm (8/10/2017), Pneumonia (? 2012), Pulmonary emphysema ([facility]), Stroke ([facility]) (2016), and Vitamin d deficiency (7/1/2013).    ROS  No chest pain, no shortness of breath, no abdominal pain       Objective:     /70 (BP Location: Left arm, Patient Position: Sitting, BP Cuff Size: Adult)   Pulse 89   Temp 36.3 °C (97.3 °F) (Temporal)   Resp 20   Ht 1.626 m (5' 4\")   Wt 87.4 kg (192 lb 9.6 oz)   SpO2 92%  Body mass index is 33.06 kg/m².   Physical Exam:  Constitutional: Alert, no distress.  Skin: Warm, dry, good turgor, no rashes in visible areas.  Eye: Equal, round and reactive, conjunctiva clear, lids normal.  ENMT: Lips without lesions, good dentition, oropharynx clear.  Neck: Trachea midline, no masses, no thyromegaly. No cervical or supraclavicular lymphadenopathy  Respiratory: Unlabored respiratory effort, lungs clear to auscultation, no wheezes, no ronchi.  Cardiovascular: Normal S1, S2, no murmur, no edema.  Abdomen: Soft, non-tender, no masses, no hepatosplenomegaly.  Psych: Alert and oriented x3, normal affect and mood.        Assessment and Plan:   The following treatment plan was discussed    1. Menopause    - DS-BONE DENSITY STUDY (DEXA); Future    2. Need for vaccination    - INFLUENZA VACCINE, HIGH DOSE (65+ ONLY)    3. Chronic gout of right foot, unspecified cause    - allopurinol (ZYLOPRIM) 100 MG Tab; Take 1 Tablet by mouth every day.  Dispense: 90 Tablet; " Refill: 1  - Renal Function Panel; Future    4. Stage 3b chronic kidney disease (HCC)    - Renal Function Panel; Future      Followup: Return in about 2 months (around 12/20/2021) for ckd, HTN.          80

## 2024-01-10 DIAGNOSIS — I10 ESSENTIAL HYPERTENSION: ICD-10-CM

## 2024-01-10 DIAGNOSIS — I15.9 SECONDARY HYPERTENSION: ICD-10-CM

## 2024-01-10 RX ORDER — CLOPIDOGREL BISULFATE 75 MG/1
TABLET ORAL
Qty: 90 TABLET | Refills: 3 | Status: SHIPPED | OUTPATIENT
Start: 2024-01-10

## 2024-01-10 RX ORDER — FUROSEMIDE 20 MG/1
TABLET ORAL
Qty: 180 TABLET | Refills: 3 | Status: SHIPPED | OUTPATIENT
Start: 2024-01-10

## 2024-01-11 NOTE — TELEPHONE ENCOUNTER
Requested Prescriptions     Pending Prescriptions Disp Refills    clopidogrel (PLAVIX) 75 MG Tab [Pharmacy Med Name: Clopidogrel Bisulfate 75 MG Oral Tablet] 90 Tablet 3     Sig: TAKE 1 TABLET BY MOUTH DAILY    furosemide (LASIX) 20 MG Tab [Pharmacy Med Name: Furosemide 20 MG Oral Tablet] 180 Tablet 3     Sig: TAKE 1 TABLET BY MOUTH TWICE  DAILY      Last office visit: 2/16/23  Last lab: 2/7/23

## 2024-04-01 DIAGNOSIS — M1A.0710 CHRONIC GOUT OF RIGHT FOOT, UNSPECIFIED CAUSE: ICD-10-CM

## 2024-04-02 NOTE — TELEPHONE ENCOUNTER
Requested Prescriptions     Pending Prescriptions Disp Refills    allopurinol (ZYLOPRIM) 100 MG Tab [Pharmacy Med Name: Allopurinol 100 MG Oral Tablet] 90 Tablet 3     Sig: TAKE 1 TABLET BY MOUTH DAILY    losartan (COZAAR) 50 MG Tab [Pharmacy Med Name: Losartan Potassium 50 MG Oral Tablet] 90 Tablet 3     Sig: TAKE 1 TABLET BY MOUTH DAILY      Last office visit: 2/16/23  Last lab: 10/24/23

## 2024-04-09 RX ORDER — LOSARTAN POTASSIUM 50 MG/1
TABLET ORAL
Qty: 90 TABLET | Refills: 3 | Status: SHIPPED | OUTPATIENT
Start: 2024-04-09

## 2024-04-09 RX ORDER — ALLOPURINOL 100 MG/1
TABLET ORAL
Qty: 90 TABLET | Refills: 3 | Status: SHIPPED | OUTPATIENT
Start: 2024-04-09

## 2024-10-15 ENCOUNTER — HOSPITAL ENCOUNTER (OUTPATIENT)
Facility: MEDICAL CENTER | Age: 74
End: 2024-10-15
Attending: INTERNAL MEDICINE
Payer: MEDICARE

## 2024-10-15 LAB
25(OH)D3 SERPL-MCNC: 79 NG/ML (ref 30–100)
BASOPHILS # BLD AUTO: 0.6 % (ref 0–1.8)
BASOPHILS # BLD: 0.04 K/UL (ref 0–0.12)
CORTIS SERPL-MCNC: 14.3 UG/DL (ref 0–23)
EOSINOPHIL # BLD AUTO: 0.15 K/UL (ref 0–0.51)
EOSINOPHIL NFR BLD: 2.3 % (ref 0–6.9)
ERYTHROCYTE [DISTWIDTH] IN BLOOD BY AUTOMATED COUNT: 44 FL (ref 35.9–50)
HCT VFR BLD AUTO: 40.6 % (ref 37–47)
HGB BLD-MCNC: 13.1 G/DL (ref 12–16)
IMM GRANULOCYTES # BLD AUTO: 0.05 K/UL (ref 0–0.11)
IMM GRANULOCYTES NFR BLD AUTO: 0.8 % (ref 0–0.9)
LYMPHOCYTES # BLD AUTO: 2.19 K/UL (ref 1–4.8)
LYMPHOCYTES NFR BLD: 32.9 % (ref 22–41)
MCH RBC QN AUTO: 29.8 PG (ref 27–33)
MCHC RBC AUTO-ENTMCNC: 32.3 G/DL (ref 32.2–35.5)
MCV RBC AUTO: 92.3 FL (ref 81.4–97.8)
MONOCYTES # BLD AUTO: 0.48 K/UL (ref 0–0.85)
MONOCYTES NFR BLD AUTO: 7.2 % (ref 0–13.4)
NEUTROPHILS # BLD AUTO: 3.74 K/UL (ref 1.82–7.42)
NEUTROPHILS NFR BLD: 56.2 % (ref 44–72)
NRBC # BLD AUTO: 0 K/UL
NRBC BLD-RTO: 0 /100 WBC (ref 0–0.2)
PLATELET # BLD AUTO: 221 K/UL (ref 164–446)
PMV BLD AUTO: 10.5 FL (ref 9–12.9)
RBC # BLD AUTO: 4.4 M/UL (ref 4.2–5.4)
T4 FREE SERPL-MCNC: 1.13 NG/DL (ref 0.93–1.7)
TSH SERPL-ACNC: 7.04 UIU/ML (ref 0.35–5.5)
WBC # BLD AUTO: 6.7 K/UL (ref 4.8–10.8)

## 2024-10-15 PROCEDURE — 84100 ASSAY OF PHOSPHORUS: CPT

## 2024-10-15 PROCEDURE — 82306 VITAMIN D 25 HYDROXY: CPT | Mod: GZ

## 2024-10-15 PROCEDURE — 84439 ASSAY OF FREE THYROXINE: CPT

## 2024-10-15 PROCEDURE — 85025 COMPLETE CBC W/AUTO DIFF WBC: CPT

## 2024-10-15 PROCEDURE — 80061 LIPID PANEL: CPT

## 2024-10-15 PROCEDURE — 84443 ASSAY THYROID STIM HORMONE: CPT

## 2024-10-15 PROCEDURE — 80053 COMPREHEN METABOLIC PANEL: CPT

## 2024-10-15 PROCEDURE — 82533 TOTAL CORTISOL: CPT

## 2024-10-16 LAB
ALBUMIN SERPL BCP-MCNC: 4 G/DL (ref 3.2–4.9)
ALBUMIN/GLOB SERPL: 1.7 G/DL
ALP SERPL-CCNC: 83 U/L (ref 30–99)
ALT SERPL-CCNC: 14 U/L (ref 2–50)
ANION GAP SERPL CALC-SCNC: 17 MMOL/L (ref 7–16)
AST SERPL-CCNC: 13 U/L (ref 12–45)
BILIRUB SERPL-MCNC: 0.4 MG/DL (ref 0.1–1.5)
BUN SERPL-MCNC: 27 MG/DL (ref 8–22)
CALCIUM ALBUM COR SERPL-MCNC: 9.7 MG/DL (ref 8.5–10.5)
CALCIUM SERPL-MCNC: 9.7 MG/DL (ref 8.5–10.5)
CHLORIDE SERPL-SCNC: 101 MMOL/L (ref 96–112)
CHOLEST SERPL-MCNC: 186 MG/DL (ref 100–199)
CO2 SERPL-SCNC: 22 MMOL/L (ref 20–33)
CREAT SERPL-MCNC: 1.81 MG/DL (ref 0.5–1.4)
GFR SERPLBLD CREATININE-BSD FMLA CKD-EPI: 29 ML/MIN/1.73 M 2
GLOBULIN SER CALC-MCNC: 2.4 G/DL (ref 1.9–3.5)
GLUCOSE SERPL-MCNC: 100 MG/DL (ref 65–99)
HDLC SERPL-MCNC: 59 MG/DL
LDLC SERPL CALC-MCNC: 74 MG/DL
PHOSPHATE SERPL-MCNC: 3.1 MG/DL (ref 2.5–4.5)
POTASSIUM SERPL-SCNC: 4.5 MMOL/L (ref 3.6–5.5)
PROT SERPL-MCNC: 6.4 G/DL (ref 6–8.2)
SODIUM SERPL-SCNC: 140 MMOL/L (ref 135–145)
TRIGL SERPL-MCNC: 264 MG/DL (ref 0–149)

## (undated) DEVICE — CATHETER IV 20 GA X 1-1/4 ---SURG.& SDS ONLY--- (50EA/BX)

## (undated) DEVICE — SUTURE 2-0 CHROMIC GUT CT-2 27 (36PK/BX)"

## (undated) DEVICE — KNIFE STEP 1.1 (6EA/BX)

## (undated) DEVICE — ELECTRODE 850 FOAM ADHESIVE - HYDROGEL RADIOTRNSPRNT (50/PK)

## (undated) DEVICE — SUTURE 2-0 VICRYL PLUS CT-1 36 (36PK/BX)"

## (undated) DEVICE — SPONGE GAUZE STER 4X4 8-PL - (2/PK 50PK/BX 12BX/CS)

## (undated) DEVICE — CON SEDATION/>5 YR 1ST 15 MIN

## (undated) DEVICE — PACK MAJOR BASIN - (2EA/CA)

## (undated) DEVICE — SENSOR SPO2 NEO LNCS ADHESIVE (20/BX) SEE USER NOTES

## (undated) DEVICE — CON SEDATION EA ADDL 15 MIN

## (undated) DEVICE — CANISTER SUCTION RIGID RED 1500CC (40EA/CA)

## (undated) DEVICE — TUBING CLEARLINK DUO-VENT - C-FLO (48EA/CA)

## (undated) DEVICE — SUCTION INSTRUMENT YANKAUER BULBOUS TIP W/O VENT (50EA/CA)

## (undated) DEVICE — GLOVE BIOGEL SURGICAL PF LATEX M SIZE 8.5 (50PR/BX 4BX/CA)

## (undated) DEVICE — NEPTUNE 4 PORT MANIFOLD - (20/PK)

## (undated) DEVICE — CANISTER SUCTION 3000ML MECHANICAL FILTER AUTO SHUTOFF MEDI-VAC NONSTERILE LF DISP  (40EA/CA)

## (undated) DEVICE — Device

## (undated) DEVICE — PAD LAP STERILE 18 X 18 - (5/PK 40PK/CA)

## (undated) DEVICE — KIT  I.V. START (100EA/CA)

## (undated) DEVICE — SUTURE 0 36IN PDS + VIO CT-1 (36PK/BX)

## (undated) DEVICE — WATER IRRIGATION STERILE 1000ML (12EA/CA)

## (undated) DEVICE — GLOVE BIOGEL SZ 8.5 SURGICAL PF LTX - (50PR/BX 4BX/CA)

## (undated) DEVICE — CLOSURE PRINEO SKIN - (2EA/BX)

## (undated) DEVICE — SODIUM CHL IRRIGATION 0.9% 1000ML (12EA/CA)

## (undated) DEVICE — EXTRACTOR CORTEX ANGL LT 26GA - (10/BX) BINKHORST

## (undated) DEVICE — SUTURE 3-0 VICRYL PLUS CT-1 - 36 INCH (36/BX)

## (undated) DEVICE — STAPLER SKIN DISP - (6/BX 10BX/CA) VISISTAT

## (undated) DEVICE — SUTURE 4-0 27IN VCRL PLUS ANTI (36PK/BX)

## (undated) DEVICE — TUBE CONNECTING SUCTION - CLEAR PLASTIC STERILE 72 IN (50EA/CA)

## (undated) DEVICE — NEEDLE CYSTOTOME OPTH VSTC  0.4MM X 16MM - (10/CA)

## (undated) DEVICE — SET EXTENSION WITH 2 PORTS (48EA/CA) ***PART #2C8610 IS A SUBSTITUTE*****

## (undated) DEVICE — CLIP MED LG INTNL HRZN TI ESCP - (20/BX)

## (undated) DEVICE — PROTECTOR ULNA NERVE - (36PR/CA)

## (undated) DEVICE — SUTURE GENERAL

## (undated) DEVICE — SET LEADWIRE 5 LEAD BEDSIDE DISPOSABLE ECG (1SET OF 5/EA)

## (undated) DEVICE — ATOMIC EDGE ACCURATE DEPTH 550 MICRON (10/CA)

## (undated) DEVICE — GLOVE BIOGEL INDICATOR SZ 8.5 SURGICAL PF LTX - (50/BX 4BX/CA)

## (undated) DEVICE — KIT ANESTHESIA W/CIRCUIT & 3/LT BAG W/FILTER (20EA/CA)

## (undated) DEVICE — GLOVE BIOGEL INDICATOR SZ 7SURGICAL PF LTX - (50/BX 4BX/CA)

## (undated) DEVICE — LACTATED RINGERS INJ 1000 ML - (14EA/CA 60CA/PF)

## (undated) DEVICE — BLADE SURGICAL CLIPPER - (50EA/CA)

## (undated) DEVICE — SUTURE 3-0 CHROMIC GUT SH 27 (36PK/BX)"

## (undated) DEVICE — ELECTRODE DUAL RETURN W/ CORD - (50/PK)

## (undated) DEVICE — GLOVE BIOGEL SZ 6 PF LATEX - (50EA/BX 4BX/CA)

## (undated) DEVICE — SUTURE 0 VICRYL PLUS CT-1 - 36 INCH (36/BX)

## (undated) DEVICE — GLOVE BIOGEL INDICATOR SZ 6 SURGICAL PF LTX -(50/BX)

## (undated) DEVICE — BOVIE  BLADE 6 EXTENDED - (50/PK)

## (undated) DEVICE — SLEEVE, VASO, THIGH, MED

## (undated) DEVICE — STERI STRIP COMPOUND BENZOIN - TINCTURE 0.6ML WITH APPLICATOR (40EA/BX)

## (undated) DEVICE — TUBE E-T HI-LO CUFF 7.5MM (10EA/PK)

## (undated) DEVICE — DRESSING TRANSPARENT FILM TEGADERM 4 X 4.75" (50EA/BX)"

## (undated) DEVICE — CLOSURE SKIN STRIP 1/2 X 4 IN - (STERI STRIP) (50/BX 4BX/CA)

## (undated) DEVICE — SUTURE 0 COATED VICRYL 6-18IN - (12PK/BX)

## (undated) DEVICE — DRESSING NON-ADHERING 8 X 3 - (50/BX)

## (undated) DEVICE — GOWN SURGEONS X-LARGE - DISP. (30/CA)

## (undated) DEVICE — SPONGE XRAY 8X4 STERL. 12PL - (10EA/TY 80TY/CA)

## (undated) DEVICE — TIP POLYMER I&A

## (undated) DEVICE — CHLORAPREP 26 ML APPLICATOR - ORANGE TINT(25/CA)

## (undated) DEVICE — SPONGE RADIOPAQUE CTN X-LG - STERILE (50PK/CA) MADE TO ORDER ITEM AND HAS A 4-6 WEEK LEAD TIME

## (undated) DEVICE — CANNULA INJECTION 27G (EYE) - 10/BX ALCON

## (undated) DEVICE — BOVIE BLADE COATED &INSULATED (50EA/PK)

## (undated) DEVICE — TRAY SRGPRP PVP IOD WT PRP - (20/CA)

## (undated) DEVICE — SUTURE 0 VICRYL PLUS CT-1 - 8 X 18 INCH (12/BX)

## (undated) DEVICE — HEAD HOLDER JUNIOR/ADULT

## (undated) DEVICE — HEMOSTAT ARISTA PWD 5 GRAM - (5/BX)

## (undated) DEVICE — MASK ANESTHESIA ADULT  - (100/CA)

## (undated) DEVICE — GOWN WARMING STANDARD FLEX - (30/CA)

## (undated) DEVICE — GLOVE BIOGEL SZ 8 SURGICAL PF LTX - (50PR/BX 4BX/CA)